# Patient Record
Sex: MALE | Race: WHITE | Employment: OTHER | URBAN - NONMETROPOLITAN AREA
[De-identification: names, ages, dates, MRNs, and addresses within clinical notes are randomized per-mention and may not be internally consistent; named-entity substitution may affect disease eponyms.]

---

## 2017-12-08 ENCOUNTER — HOSPITAL ENCOUNTER (OUTPATIENT)
Age: 72
Discharge: HOME | End: 2017-12-08
Payer: MEDICARE

## 2017-12-08 LAB
ALT SERPL-CCNC: 22 U/L (ref 11–66)
ANION GAP SERPL CALCULATED.3IONS-SCNC: 13 MEQ/L (ref 8–16)
AST SERPL-CCNC: 18 U/L (ref 5–40)
AVERAGE GLUCOSE: 108 MG/DL (ref 70–126)
BASOPHILS # BLD: 0.6 %
BASOPHILS ABSOLUTE: 0 THOU/MM3 (ref 0–0.1)
BILIRUBIN URINE: NEGATIVE
BLOOD, URINE: NEGATIVE
BUN BLDV-MCNC: 16 MG/DL (ref 7–22)
CALCIUM SERPL-MCNC: 9.3 MG/DL (ref 8.5–10.5)
CHARACTER, URINE: CLEAR
CHLORIDE BLD-SCNC: 98 MEQ/L (ref 98–111)
CHOLESTEROL, TOTAL: 196 MG/DL (ref 100–199)
CO2: 29 MEQ/L (ref 23–33)
COLOR: YELLOW
CREAT SERPL-MCNC: 0.8 MG/DL (ref 0.4–1.2)
EOSINOPHIL # BLD: 3.3 %
EOSINOPHILS ABSOLUTE: 0.2 THOU/MM3 (ref 0–0.4)
GFR SERPL CREATININE-BSD FRML MDRD: > 90 ML/MIN/1.73M2
GLUCOSE BLD-MCNC: 109 MG/DL (ref 70–108)
GLUCOSE, URINE: NEGATIVE MG/DL
HBA1C MFR BLD: 5.6 % (ref 4.4–6.4)
HCT VFR BLD CALC: 48.3 % (ref 42–52)
HDLC SERPL-MCNC: 41 MG/DL
HEMOGLOBIN: 16.6 GM/DL (ref 14–18)
KETONES, URINE: NEGATIVE
LDL CHOLESTEROL CALCULATED: 127 MG/DL
LEUKOCYTE EST, POC: NEGATIVE
LYMPHOCYTES # BLD: 38.6 %
LYMPHOCYTES ABSOLUTE: 2.4 THOU/MM3 (ref 1–4.8)
MCH RBC QN AUTO: 33.5 PG (ref 27–31)
MCHC RBC AUTO-ENTMCNC: 34.5 GM/DL (ref 33–37)
MCV RBC AUTO: 97.1 FL (ref 80–94)
MONOCYTES # BLD: 9.4 %
MONOCYTES ABSOLUTE: 0.6 THOU/MM3 (ref 0.4–1.3)
NITRITE, URINE: NEGATIVE
NUCLEATED RED BLOOD CELLS: 0 /100 WBC
PDW BLD-RTO: 13.4 % (ref 11.5–14.5)
PH UA: 6
PLATELET # BLD: 131 THOU/MM3 (ref 130–400)
PMV BLD AUTO: 8.5 MCM (ref 7.4–10.4)
POTASSIUM SERPL-SCNC: 5.1 MEQ/L (ref 3.5–5.2)
PROSTATE SPECIFIC ANTIGEN: 3.64 NG/ML (ref 0–1)
PROTEIN UA: NEGATIVE MG/DL
RBC # BLD: 4.97 MILL/MM3 (ref 4.7–6.1)
SEG NEUTROPHILS: 48.1 %
SEGMENTED NEUTROPHILS ABSOLUTE COUNT: 2.9 THOU/MM3 (ref 1.8–7.7)
SODIUM BLD-SCNC: 140 MEQ/L (ref 135–145)
SPECIFIC GRAVITY UA: 1.01 (ref 1–1.03)
TRIGL SERPL-MCNC: 141 MG/DL (ref 0–199)
TSH SERPL DL<=0.05 MIU/L-ACNC: 2.31 UIU/ML (ref 0.4–4.2)
UROBILINOGEN, URINE: 0.2 EU/DL
WBC # BLD: 6.1 THOU/MM3 (ref 4.8–10.8)

## 2017-12-08 PROCEDURE — 85025 COMPLETE CBC W/AUTO DIFF WBC: CPT

## 2017-12-08 PROCEDURE — 84460 ALANINE AMINO (ALT) (SGPT): CPT

## 2017-12-08 PROCEDURE — 84450 TRANSFERASE (AST) (SGOT): CPT

## 2017-12-08 PROCEDURE — 36415 COLL VENOUS BLD VENIPUNCTURE: CPT

## 2017-12-08 PROCEDURE — 83036 HEMOGLOBIN GLYCOSYLATED A1C: CPT

## 2017-12-08 PROCEDURE — G0103 PSA SCREENING: HCPCS

## 2017-12-08 PROCEDURE — 80048 BASIC METABOLIC PNL TOTAL CA: CPT

## 2017-12-08 PROCEDURE — 81003 URINALYSIS AUTO W/O SCOPE: CPT

## 2017-12-08 PROCEDURE — 84443 ASSAY THYROID STIM HORMONE: CPT

## 2017-12-08 PROCEDURE — 80061 LIPID PANEL: CPT

## 2018-01-10 ENCOUNTER — HOSPITAL ENCOUNTER (OUTPATIENT)
Age: 73
Discharge: HOME | End: 2018-01-10
Payer: MEDICARE

## 2018-01-10 ENCOUNTER — OFFICE VISIT (OUTPATIENT)
Dept: SURGERY | Age: 73
End: 2018-01-10
Payer: MEDICARE

## 2018-01-10 VITALS
HEART RATE: 81 BPM | RESPIRATION RATE: 20 BRPM | BODY MASS INDEX: 31.64 KG/M2 | SYSTOLIC BLOOD PRESSURE: 142 MMHG | TEMPERATURE: 97.9 F | WEIGHT: 226 LBS | DIASTOLIC BLOOD PRESSURE: 88 MMHG | HEIGHT: 71 IN

## 2018-01-10 DIAGNOSIS — K40.90 RIGHT INGUINAL HERNIA: Primary | ICD-10-CM

## 2018-01-10 DIAGNOSIS — I10 ESSENTIAL HYPERTENSION: ICD-10-CM

## 2018-01-10 DIAGNOSIS — K42.9 UMBILICAL HERNIA WITHOUT OBSTRUCTION AND WITHOUT GANGRENE: ICD-10-CM

## 2018-01-10 LAB
EKG ATRIAL RATE: 70 BPM
EKG P AXIS: 46 DEGREES
EKG P-R INTERVAL: 172 MS
EKG Q-T INTERVAL: 380 MS
EKG QRS DURATION: 92 MS
EKG QTC CALCULATION (BAZETT): 410 MS
EKG R AXIS: -36 DEGREES
EKG T AXIS: 47 DEGREES
EKG VENTRICULAR RATE: 70 BPM

## 2018-01-10 PROCEDURE — 99203 OFFICE O/P NEW LOW 30 MIN: CPT | Performed by: SURGERY

## 2018-01-10 PROCEDURE — 93005 ELECTROCARDIOGRAM TRACING: CPT

## 2018-01-10 RX ORDER — LISINOPRIL 5 MG/1
5 TABLET ORAL DAILY
COMMUNITY
End: 2018-02-14 | Stop reason: SDUPTHER

## 2018-01-10 RX ORDER — TAMSULOSIN HYDROCHLORIDE 0.4 MG/1
0.4 CAPSULE ORAL DAILY
COMMUNITY

## 2018-01-10 NOTE — PATIENT INSTRUCTIONS
Surgery scheduled 2/13/18. Arrive to [facility]. [facility] 2nd floor registration desk at 8 am. Nothing to eat or drink after midnight.  Bring a

## 2018-01-13 ASSESSMENT — ENCOUNTER SYMPTOMS
APNEA: 0
RHINORRHEA: 0
TROUBLE SWALLOWING: 0
SORE THROAT: 0
STRIDOR: 0
ABDOMINAL PAIN: 1
EYE DISCHARGE: 0
ALLERGIC/IMMUNOLOGIC NEGATIVE: 1
COUGH: 0
FACIAL SWELLING: 0
CHOKING: 0
PHOTOPHOBIA: 0
EYE REDNESS: 0
EYE ITCHING: 0
DIARRHEA: 0
BLOOD IN STOOL: 0
SHORTNESS OF BREATH: 0
COLOR CHANGE: 0
RECTAL PAIN: 0
CHEST TIGHTNESS: 0
VOMITING: 0
NAUSEA: 0
BACK PAIN: 0
VOICE CHANGE: 0
EYE PAIN: 0
WHEEZING: 0
SINUS PRESSURE: 0
ANAL BLEEDING: 0
ABDOMINAL DISTENTION: 0
CONSTIPATION: 0

## 2018-01-14 NOTE — PROGRESS NOTES
sounds, intact distal pulses and normal pulses. Exam reveals no gallop. No murmur heard. Pulmonary/Chest: Effort normal and breath sounds normal. No stridor. No respiratory distress. He has no decreased breath sounds. He has no wheezes. He has no rales. He exhibits no tenderness and no deformity. Abdominal: Soft. Bowel sounds are normal. He exhibits no distension, no fluid wave, no abdominal bruit, no pulsatile midline mass and no mass. There is no hepatosplenomegaly. There is tenderness in the periumbilical area. There is no rigidity, no rebound, no guarding and no CVA tenderness. A hernia (umbilical) is present. Hernia confirmed positive in the right inguinal area. Hernia confirmed negative in the ventral area and confirmed negative in the left inguinal area. Musculoskeletal: Normal range of motion. He exhibits no edema or tenderness. Lymphadenopathy:     He has no cervical adenopathy. He has no axillary adenopathy. Right: No inguinal and no supraclavicular adenopathy present. Left: No inguinal and no supraclavicular adenopathy present. Neurological: He is alert and oriented to person, place, and time. He has normal strength. No cranial nerve deficit or sensory deficit. Gait normal.   Skin: Skin is warm and dry. No abrasion, no bruising, no burn, no laceration and no rash noted. He is not diaphoretic. No cyanosis or erythema. No pallor. Nails show no clubbing. Psychiatric: He has a normal mood and affect.  His speech is normal and behavior is normal. Judgment and thought content normal. Cognition and memory are normal.     Lab Results   Component Value Date     12/08/2017    K 5.1 12/08/2017    CL 98 12/08/2017    CO2 29 12/08/2017     Lab Results   Component Value Date    CREATININE 0.8 12/08/2017     Lab Results   Component Value Date    WBC 6.1 12/08/2017    HGB 16.6 12/08/2017    HCT 48.3 12/08/2017    MCV 97.1 (H) 12/08/2017     12/08/2017     Lab Results

## 2018-01-17 ENCOUNTER — OFFICE VISIT (OUTPATIENT)
Dept: CARDIOLOGY CLINIC | Age: 73
End: 2018-01-17
Payer: MEDICARE

## 2018-01-17 VITALS
HEART RATE: 82 BPM | SYSTOLIC BLOOD PRESSURE: 148 MMHG | DIASTOLIC BLOOD PRESSURE: 92 MMHG | HEIGHT: 71 IN | BODY MASS INDEX: 31.47 KG/M2 | WEIGHT: 224.8 LBS

## 2018-01-17 DIAGNOSIS — I20.8 ANGINA AT REST (HCC): ICD-10-CM

## 2018-01-17 DIAGNOSIS — Z01.818 PRE-OP EVALUATION: ICD-10-CM

## 2018-01-17 DIAGNOSIS — R07.2 PRECORDIAL PAIN: ICD-10-CM

## 2018-01-17 PROBLEM — I20.89 ANGINA AT REST: Status: ACTIVE | Noted: 2018-01-17

## 2018-01-17 PROCEDURE — 99204 OFFICE O/P NEW MOD 45 MIN: CPT | Performed by: INTERNAL MEDICINE

## 2018-01-17 ASSESSMENT — ENCOUNTER SYMPTOMS
GASTROINTESTINAL NEGATIVE: 1
EYES NEGATIVE: 1
SHORTNESS OF BREATH: 1

## 2018-01-17 NOTE — PROGRESS NOTES
9.3 12/08/2017    AST 18 12/08/2017    ALT 22 12/08/2017     Hepatic Function Panel:    Lab Results   Component Value Date    ALT 22 12/08/2017    AST 18 12/08/2017     Magnesium:  No results found for: MG  PT/INR:  No results found for: PROTIME, INR  HgBA1c:    Lab Results   Component Value Date    LABA1C 5.6 12/08/2017     FLP:    Lab Results   Component Value Date    TRIG 141 12/08/2017    TRIG 121 03/16/2017    TRIG 142 02/12/2016    HDL 41 12/08/2017    HDL 78 03/16/2017    HDL 67 02/12/2016    LDLCALC 127 12/08/2017    LDLCALC 83 03/16/2017    LDLCALC 47 02/12/2016     TSH:    Lab Results   Component Value Date    TSH 2.310 12/08/2017     No results for input(s): CKTOTAL, CKMB, CKMBINDEX, TROPONINI in the last 72 hours. Assessment:   Mr. Maria G Razo is a 81 y.o. who is known to us with history of tobacco abuse and mild-moderate CAD in the past, who is seen for pre-op evaluation. The patient reports chest pain at rest. Retrosternal lasting a few minutes and subsiding spontaneously. Had cardiac cath in the past and was told he had some blockage, That was 15 years ago. Has palpitations with pre-syncope. He experienced that while driving. Hypertension. Smokes. FH of CAD, at 80-81 y/o. Hypothyroidism. The following diagnoses were addressed during this visit:  1. Pre-op evaluation: Prior to hernia surgery. XR Cardiac Cath Procedure   2. Precordial pain  XR Cardiac Cath Procedure   3. Angina at rest [facility])  XR Cardiac Cath Procedure           Plan:   The patient has multiple CVRF including htn, FH of CAD, tobacco abuse, a who presents with worrisome symptoms of chest pain at rest, and palpitations with presyncope. C/p pain in legs and thighs when walking. PT 2+ b/l. Based on the above, I think it is prudent to proceed with cardiac cath to rule out progression of CAD. Schedule cardiac cath.   The cardiac catheterization and angioplasty with stent implantation procedure was explained in details to the patient and family. All the potential risks and complications including, but not limited to, bleeding, renal damage and failure, stroke, cardiac rupture, hematoma, infection, emergency bypass surgery, vessel tear, and even death, were explained. The patient and family expressed understanding and agreed to proceed. I asked the patient to start taking an aspirin daily. Cardiac cath right radial approach. Orders Placed:  Orders Placed This Encounter   Procedures    XR Cardiac Cath Procedure     Standing Status:   Future     Standing Expiration Date:   1/17/2019     Order Specific Question:   Reason for exam:     Answer:   angina at rest     Medications:  No orders of the defined types were placed in this encounter. Discussed use, benefit, and side effects of prescribed medications. All patient questions answered. Pt voiced understanding. Instructed to continue current medications, diet and exercise. Continue risk factor modification and medical management. Patient agreed with treatment plan. Follow up as directed. The patient will be seen in 6 months for re-evaluation or sooner if he develops new symptoms. In the mean time, the patient will follow up with Derrick Contreras MD as scheduled.

## 2018-01-22 ENCOUNTER — OFFICE VISIT (OUTPATIENT)
Dept: UROLOGY | Age: 73
End: 2018-01-22
Payer: MEDICARE

## 2018-01-22 VITALS
HEIGHT: 71 IN | DIASTOLIC BLOOD PRESSURE: 88 MMHG | BODY MASS INDEX: 32.03 KG/M2 | SYSTOLIC BLOOD PRESSURE: 128 MMHG | WEIGHT: 228.8 LBS

## 2018-01-22 DIAGNOSIS — R97.20 ELEVATED PSA: Primary | ICD-10-CM

## 2018-01-22 DIAGNOSIS — N40.1 HYPERTROPHY OF PROSTATE WITH URINARY OBSTRUCTION: ICD-10-CM

## 2018-01-22 DIAGNOSIS — N13.8 HYPERTROPHY OF PROSTATE WITH URINARY OBSTRUCTION: ICD-10-CM

## 2018-01-22 DIAGNOSIS — R35.0 URINARY FREQUENCY: ICD-10-CM

## 2018-01-22 LAB
BILIRUBIN URINE: NEGATIVE
BLOOD URINE, POC: NEGATIVE
CHARACTER, URINE: CLEAR
COLOR, URINE: YELLOW
GLUCOSE URINE: NEGATIVE MG/DL
KETONES, URINE: NEGATIVE
LEUKOCYTE CLUMPS, URINE: NEGATIVE
NITRITE, URINE: NEGATIVE
PH, URINE: 7
POST VOID RESIDUAL (PVR): NORMAL ML
PROTEIN, URINE: NEGATIVE MG/DL
SPECIFIC GRAVITY, URINE: 1.01 (ref 1–1.03)
UROBILINOGEN, URINE: 0.2 EU/DL

## 2018-01-22 PROCEDURE — 51798 US URINE CAPACITY MEASURE: CPT | Performed by: UROLOGY

## 2018-01-22 PROCEDURE — 99204 OFFICE O/P NEW MOD 45 MIN: CPT | Performed by: UROLOGY

## 2018-01-22 PROCEDURE — 81003 URINALYSIS AUTO W/O SCOPE: CPT | Performed by: UROLOGY

## 2018-01-22 ASSESSMENT — ENCOUNTER SYMPTOMS
SHORTNESS OF BREATH: 0
DIARRHEA: 0
CONSTIPATION: 0
BLOOD IN STOOL: 0
CHEST TIGHTNESS: 0

## 2018-01-22 NOTE — PROGRESS NOTES
Subjective:      Patient ID: Sallie Fernandez 81 y.o. male 1945    Chief Complaint   Patient presents with    Advice Only     new patient; ref: Leigha Pizano Elevated PSA    Urinary Frequency       Other   This is a new (elevated PSA) problem. The current episode started more than 1 month ago. The problem occurs intermittently. The problem has been unchanged. Pertinent negatives include no chest pain, chills, fever, numbness or rash. Nothing aggravates the symptoms. He has tried nothing for the symptoms. The treatment provided no relief. Benign Prostatic Hypertrophy   This is a new problem. The current episode started more than 1 month ago. The problem has been gradually worsening since onset. Irritative symptoms include frequency. Pertinent negatives include no chills or dysuria. AUA score is 8-19. His sexual activity is non-contributory to the current illness. Nothing aggravates the symptoms. Past treatments include tamsulosin. The treatment provided mild relief. He has been using treatment for 2 or more years. Past Medical History:   Diagnosis Date    Angina at rest [facility]) 1/17/2018    Hyperlipidemia     Hypertension     Precordial pain 1/17/2018    S/P cardiac cath: 1/25/2018: 70-75% mid-LAD. LCX OK. RCA 20%. LVEF 454-50%. LVEDP 12 mmHg. 1/25/2018 1/25/2018: 70-75% mid-LAD. LCX OK. RCA 20%. LVEF 454-50%. LVEDP 12 mmHg. Dr. John Alexander S/P PTCA: 1/25/2018: FFR-guided stenting of mid-LAD Xience 2.75x28 mm, post-dilated to 3.78 mm proximally. 1/25/2018 1/25/2018: FFR-guided stenting of mid-LAD Xience 2.75x28 mm, post-dilated to 3.78 mm proximally. Dr. John Alexander Thyroid disease        Social History     Social History    Marital status:      Spouse name: N/A    Number of children: N/A    Years of education: N/A     Occupational History    Not on file.      Social History Main Topics    Smoking status: Current Every Day Smoker     Packs/day: 1.00     Types: Cigarettes    Smokeless tobacco: Former User     Quit date: 2014    Alcohol use Yes      Comment: 6-10 beers daily    Drug use: No    Sexual activity: No     Other Topics Concern    Not on file     Social History Narrative    No narrative on file       Family History   Problem Relation Age of Onset    Cancer Mother      breast    Heart Disease Father        Past Surgical History:   Procedure Laterality Date    CATARACT REMOVAL Bilateral 2017    JOINT REPLACEMENT Left 2010    KNEE SURGERY Bilateral     scopes       No Known Allergies      Current Outpatient Prescriptions:     lisinopril (PRINIVIL;ZESTRIL) 5 MG tablet, Take 5 mg by mouth daily, Disp: , Rfl:     tamsulosin (FLOMAX) 0.4 MG capsule, Take 0.4 mg by mouth daily, Disp: , Rfl:     aspirin 81 MG chewable tablet, Take 1 tablet by mouth daily, Disp: 30 tablet, Rfl: 0    atorvastatin (LIPITOR) 80 MG tablet, Take 1 tablet by mouth nightly, Disp: 30 tablet, Rfl: 3    metoprolol tartrate (LOPRESSOR) 25 MG tablet, Take 1 tablet by mouth 2 times daily, Disp: 60 tablet, Rfl: 3    amLODIPine (NORVASC) 10 MG tablet, Take 0.5 tablets by mouth daily, Disp: 30 tablet, Rfl: 3    prasugrel (EFFIENT) 10 MG TABS, Take 1 tablet by mouth daily, Disp: 30 tablet, Rfl: 3    nitroGLYCERIN (NITROSTAT) 0.4 MG SL tablet, Place 1 tablet under the tongue every 5 minutes as needed for Chest pain up to max of 3 total doses. If no relief after 1 dose, call 911., Disp: 25 tablet, Rfl: 1    levothyroxine (SYNTHROID) 25 MCG tablet, Take 25 mcg by mouth Daily, Disp: , Rfl:     Review of Systems   Constitutional: Negative for chills and fever. HENT: Negative for mouth sores and nosebleeds. Respiratory: Negative for chest tightness and shortness of breath. Cardiovascular: Negative for chest pain and palpitations. Gastrointestinal: Negative for blood in stool, constipation and diarrhea. Genitourinary: Positive for frequency. Negative for difficulty urinating and dysuria.    Skin: of mid-LAD Xience 2.75x28 mm, post-dilated to 3.78 mm proximally. Dr. Shanthi Mckeon Thyroid disease           Plan:        Follow up in 6 months with PSA in 3 and in 6 months. Will consider Proscar at that visit depending on how PSA is changing. Will re-check PVR at that visit as well.

## 2018-01-24 ENCOUNTER — PREP FOR PROCEDURE (OUTPATIENT)
Dept: CARDIOLOGY | Age: 73
End: 2018-01-24

## 2018-01-24 RX ORDER — NITROGLYCERIN 0.4 MG/1
0.4 TABLET SUBLINGUAL EVERY 5 MIN PRN
Status: CANCELLED | OUTPATIENT
Start: 2018-01-24

## 2018-01-24 RX ORDER — SODIUM CHLORIDE 0.9 % (FLUSH) 0.9 %
10 SYRINGE (ML) INJECTION PRN
Status: CANCELLED | OUTPATIENT
Start: 2018-01-24

## 2018-01-24 RX ORDER — SODIUM CHLORIDE 9 MG/ML
INJECTION, SOLUTION INTRAVENOUS CONTINUOUS
Status: CANCELLED | OUTPATIENT
Start: 2018-01-24

## 2018-01-24 RX ORDER — SODIUM CHLORIDE 0.9 % (FLUSH) 0.9 %
10 SYRINGE (ML) INJECTION EVERY 12 HOURS SCHEDULED
Status: CANCELLED | OUTPATIENT
Start: 2018-01-24

## 2018-01-24 RX ORDER — ASPIRIN 81 MG/1
324 TABLET, CHEWABLE ORAL ONCE
Status: CANCELLED | OUTPATIENT
Start: 2018-01-24 | End: 2018-01-24

## 2018-01-24 RX ORDER — DIPHENHYDRAMINE HYDROCHLORIDE 50 MG/ML
25 INJECTION INTRAMUSCULAR; INTRAVENOUS ONCE
Status: CANCELLED | OUTPATIENT
Start: 2018-01-24 | End: 2018-01-24

## 2018-01-25 ENCOUNTER — HOSPITAL ENCOUNTER (OUTPATIENT)
Dept: INPATIENT UNIT | Age: 73
Discharge: HOME | End: 2018-01-26
Attending: INTERNAL MEDICINE | Admitting: INTERNAL MEDICINE
Payer: MEDICARE

## 2018-01-25 ENCOUNTER — APPOINTMENT (OUTPATIENT)
Dept: CARDIAC CATH/INVASIVE PROCEDURES | Age: 73
End: 2018-01-25
Attending: INTERNAL MEDICINE
Payer: MEDICARE

## 2018-01-25 PROBLEM — Z98.890 S/P CARDIAC CATH: Status: ACTIVE | Noted: 2018-01-25

## 2018-01-25 PROBLEM — Z98.61 S/P PTCA (PERCUTANEOUS TRANSLUMINAL CORONARY ANGIOPLASTY): Status: ACTIVE | Noted: 2018-01-25

## 2018-01-25 LAB
ABO: NORMAL
ANION GAP SERPL CALCULATED.3IONS-SCNC: 14 MEQ/L (ref 8–16)
ANTIBODY SCREEN: NORMAL
BUN BLDV-MCNC: 16 MG/DL (ref 7–22)
CALCIUM SERPL-MCNC: 9.2 MG/DL (ref 8.5–10.5)
CHLORIDE BLD-SCNC: 105 MEQ/L (ref 98–111)
CHOLESTEROL, TOTAL: 192 MG/DL (ref 100–199)
CO2: 26 MEQ/L (ref 23–33)
CREAT SERPL-MCNC: 0.9 MG/DL (ref 0.4–1.2)
GFR SERPL CREATININE-BSD FRML MDRD: 83 ML/MIN/1.73M2
GLUCOSE BLD-MCNC: 117 MG/DL (ref 70–108)
HCT VFR BLD CALC: 48.7 % (ref 42–52)
HDLC SERPL-MCNC: 46 MG/DL
HEMOGLOBIN: 16.7 GM/DL (ref 14–18)
LDL CHOLESTEROL CALCULATED: 105 MG/DL
MCH RBC QN AUTO: 33.9 PG (ref 27–31)
MCHC RBC AUTO-ENTMCNC: 34.4 GM/DL (ref 33–37)
MCV RBC AUTO: 98.5 FL (ref 80–94)
PDW BLD-RTO: 13.8 % (ref 11.5–14.5)
PLATELET # BLD: 133 THOU/MM3 (ref 130–400)
PMV BLD AUTO: 8.5 MCM (ref 7.4–10.4)
POTASSIUM REFLEX MAGNESIUM: 4 MEQ/L (ref 3.5–5.2)
RBC # BLD: 4.94 MILL/MM3 (ref 4.7–6.1)
RH FACTOR: NORMAL
SODIUM BLD-SCNC: 145 MEQ/L (ref 135–145)
TRIGL SERPL-MCNC: 203 MG/DL (ref 0–199)
WBC # BLD: 6 THOU/MM3 (ref 4.8–10.8)

## 2018-01-25 PROCEDURE — 93458 L HRT ARTERY/VENTRICLE ANGIO: CPT | Performed by: INTERNAL MEDICINE

## 2018-01-25 PROCEDURE — 80061 LIPID PANEL: CPT

## 2018-01-25 PROCEDURE — 93571 IV DOP VEL&/PRESS C FLO 1ST: CPT | Performed by: INTERNAL MEDICINE

## 2018-01-25 PROCEDURE — 86850 RBC ANTIBODY SCREEN: CPT

## 2018-01-25 PROCEDURE — 6370000000 HC RX 637 (ALT 250 FOR IP)

## 2018-01-25 PROCEDURE — A6258 TRANSPARENT FILM >16<=48 IN: HCPCS

## 2018-01-25 PROCEDURE — 6360000002 HC RX W HCPCS

## 2018-01-25 PROCEDURE — 2580000003 HC RX 258: Performed by: PHYSICIAN ASSISTANT

## 2018-01-25 PROCEDURE — 85027 COMPLETE CBC AUTOMATED: CPT

## 2018-01-25 PROCEDURE — C1769 GUIDE WIRE: HCPCS

## 2018-01-25 PROCEDURE — C1874 STENT, COATED/COV W/DEL SYS: HCPCS

## 2018-01-25 PROCEDURE — C1725 CATH, TRANSLUMIN NON-LASER: HCPCS

## 2018-01-25 PROCEDURE — C1894 INTRO/SHEATH, NON-LASER: HCPCS

## 2018-01-25 PROCEDURE — 86900 BLOOD TYPING SEROLOGIC ABO: CPT

## 2018-01-25 PROCEDURE — C1760 CLOSURE DEV, VASC: HCPCS

## 2018-01-25 PROCEDURE — 92928 PRQ TCAT PLMT NTRAC ST 1 LES: CPT | Performed by: INTERNAL MEDICINE

## 2018-01-25 PROCEDURE — 36415 COLL VENOUS BLD VENIPUNCTURE: CPT

## 2018-01-25 PROCEDURE — 2580000003 HC RX 258: Performed by: INTERNAL MEDICINE

## 2018-01-25 PROCEDURE — C1887 CATHETER, GUIDING: HCPCS

## 2018-01-25 PROCEDURE — 93005 ELECTROCARDIOGRAM TRACING: CPT

## 2018-01-25 PROCEDURE — 80048 BASIC METABOLIC PNL TOTAL CA: CPT

## 2018-01-25 PROCEDURE — 2720000010 HC SURG SUPPLY STERILE

## 2018-01-25 PROCEDURE — C1773 RET DEV, INSERTABLE: HCPCS

## 2018-01-25 PROCEDURE — 6370000000 HC RX 637 (ALT 250 FOR IP): Performed by: INTERNAL MEDICINE

## 2018-01-25 PROCEDURE — 2500000003 HC RX 250 WO HCPCS

## 2018-01-25 PROCEDURE — 86901 BLOOD TYPING SEROLOGIC RH(D): CPT

## 2018-01-25 PROCEDURE — 2780000010 HC IMPLANT OTHER

## 2018-01-25 RX ORDER — SODIUM CHLORIDE 0.9 % (FLUSH) 0.9 %
10 SYRINGE (ML) INJECTION EVERY 12 HOURS SCHEDULED
Status: DISCONTINUED | OUTPATIENT
Start: 2018-01-25 | End: 2018-01-25

## 2018-01-25 RX ORDER — TAMSULOSIN HYDROCHLORIDE 0.4 MG/1
0.4 CAPSULE ORAL DAILY
Status: DISCONTINUED | OUTPATIENT
Start: 2018-01-25 | End: 2018-01-26 | Stop reason: HOSPADM

## 2018-01-25 RX ORDER — LISINOPRIL 5 MG/1
5 TABLET ORAL DAILY
Status: DISCONTINUED | OUTPATIENT
Start: 2018-01-25 | End: 2018-01-26 | Stop reason: HOSPADM

## 2018-01-25 RX ORDER — ASPIRIN 81 MG/1
324 TABLET, CHEWABLE ORAL ONCE
Status: DISCONTINUED | OUTPATIENT
Start: 2018-01-25 | End: 2018-01-25

## 2018-01-25 RX ORDER — SODIUM CHLORIDE 9 MG/ML
INJECTION, SOLUTION INTRAVENOUS CONTINUOUS
Status: DISCONTINUED | OUTPATIENT
Start: 2018-01-25 | End: 2018-01-25

## 2018-01-25 RX ORDER — LEVOTHYROXINE SODIUM 0.03 MG/1
25 TABLET ORAL DAILY
COMMUNITY

## 2018-01-25 RX ORDER — PRASUGREL 10 MG/1
10 TABLET, FILM COATED ORAL DAILY
Status: DISCONTINUED | OUTPATIENT
Start: 2018-01-26 | End: 2018-01-26 | Stop reason: HOSPADM

## 2018-01-25 RX ORDER — ACETAMINOPHEN 325 MG/1
650 TABLET ORAL EVERY 4 HOURS PRN
Status: DISCONTINUED | OUTPATIENT
Start: 2018-01-25 | End: 2018-01-26 | Stop reason: HOSPADM

## 2018-01-25 RX ORDER — AMLODIPINE BESYLATE 10 MG/1
10 TABLET ORAL DAILY
Status: DISCONTINUED | OUTPATIENT
Start: 2018-01-25 | End: 2018-01-26

## 2018-01-25 RX ORDER — NITROGLYCERIN 0.4 MG/1
0.4 TABLET SUBLINGUAL EVERY 5 MIN PRN
Status: DISCONTINUED | OUTPATIENT
Start: 2018-01-25 | End: 2018-01-25

## 2018-01-25 RX ORDER — ASPIRIN 81 MG/1
81 TABLET, CHEWABLE ORAL DAILY
Status: DISCONTINUED | OUTPATIENT
Start: 2018-01-26 | End: 2018-01-26 | Stop reason: HOSPADM

## 2018-01-25 RX ORDER — SODIUM CHLORIDE 9 MG/ML
INJECTION, SOLUTION INTRAVENOUS CONTINUOUS
Status: DISCONTINUED | OUTPATIENT
Start: 2018-01-25 | End: 2018-01-26 | Stop reason: HOSPADM

## 2018-01-25 RX ORDER — DIPHENHYDRAMINE HYDROCHLORIDE 50 MG/ML
25 INJECTION INTRAMUSCULAR; INTRAVENOUS ONCE
Status: DISCONTINUED | OUTPATIENT
Start: 2018-01-25 | End: 2018-01-25

## 2018-01-25 RX ORDER — ASPIRIN 325 MG
TABLET ORAL
Status: COMPLETED
Start: 2018-01-25 | End: 2018-01-25

## 2018-01-25 RX ORDER — SODIUM CHLORIDE 0.9 % (FLUSH) 0.9 %
10 SYRINGE (ML) INJECTION PRN
Status: DISCONTINUED | OUTPATIENT
Start: 2018-01-25 | End: 2018-01-25

## 2018-01-25 RX ORDER — ONDANSETRON 2 MG/ML
4 INJECTION INTRAMUSCULAR; INTRAVENOUS EVERY 6 HOURS PRN
Status: DISCONTINUED | OUTPATIENT
Start: 2018-01-25 | End: 2018-01-26 | Stop reason: HOSPADM

## 2018-01-25 RX ORDER — ATORVASTATIN CALCIUM 80 MG/1
80 TABLET, FILM COATED ORAL NIGHTLY
Status: DISCONTINUED | OUTPATIENT
Start: 2018-01-25 | End: 2018-01-26 | Stop reason: HOSPADM

## 2018-01-25 RX ORDER — SODIUM CHLORIDE 0.9 % (FLUSH) 0.9 %
10 SYRINGE (ML) INJECTION PRN
Status: DISCONTINUED | OUTPATIENT
Start: 2018-01-25 | End: 2018-01-26 | Stop reason: HOSPADM

## 2018-01-25 RX ORDER — LEVOTHYROXINE SODIUM 0.03 MG/1
25 TABLET ORAL DAILY
Status: DISCONTINUED | OUTPATIENT
Start: 2018-01-25 | End: 2018-01-26 | Stop reason: HOSPADM

## 2018-01-25 RX ORDER — SODIUM CHLORIDE 0.9 % (FLUSH) 0.9 %
10 SYRINGE (ML) INJECTION EVERY 12 HOURS SCHEDULED
Status: DISCONTINUED | OUTPATIENT
Start: 2018-01-25 | End: 2018-01-26 | Stop reason: HOSPADM

## 2018-01-25 RX ADMIN — ASPIRIN 325 MG: 325 TABLET, COATED ORAL at 08:13

## 2018-01-25 RX ADMIN — SODIUM CHLORIDE: 9 INJECTION, SOLUTION INTRAVENOUS at 07:25

## 2018-01-25 RX ADMIN — METOPROLOL TARTRATE 25 MG: 25 TABLET ORAL at 14:35

## 2018-01-25 RX ADMIN — SODIUM CHLORIDE: 9 INJECTION, SOLUTION INTRAVENOUS at 14:35

## 2018-01-25 NOTE — H&P
[facility]  Sedation/Analgesia History & Physical    Pt Name: Anu Blanco  MRN: [id number]  YOB: 1945  Provider Performing Procedure: Diana Ledezma  Primary Care Physician: Nichole Farr MD    PRE-PROCEDURE   DNR-CCA/DNR-CC []Yes [x]No  Brief History/Pre-Procedure Diagnosis: Chest pain at rest. Angina at rest. Multiple CVRF. CONSENT  I have discussed with the patient and or the patient representative the indication, alternatives, and the possible risk and/ or complications of the planned procedure and the anesthesia or conscious sedation methods. The patient and or representative appear to understand and agree to proceed. I have discussed with the patient risks, benefits, and alternatives (and relevant risks, benefits, and side effects related to alternatives or not receiving care), and likelihood of the success. MEDICAL HISTORY        has a past medical history of Angina at rest [facility]); Hyperlipidemia; Hypertension; Precordial pain; and Thyroid disease. SURGICAL HISTORY   has a past surgical history that includes knee surgery (Bilateral); Cataract removal (Bilateral, 2017); and joint replacement (Left, 2010).   Additional information:       ALLERGIES   Allergies as of 01/25/2018    (No Known Allergies)     Additional information:       MEDICATIONS   Coumadin Use Last 7 Days [x]No []Yes  Antiplatelet drug therapy use last 7 days  []No [x]Yes  Other anticoagulant use last 7 days  [x]No []Yes      Current Facility-Administered Medications:     0.9 % sodium chloride infusion, , Intravenous, Continuous, Rainer Samuels PA-C, Last Rate: 75 mL/hr at 01/25/18 0725    aspirin chewable tablet 324 mg, 324 mg, Oral, Once, Rainer Samuels PA-C    diphenhydrAMINE (BENADRYL) injection 25 mg, 25 mg, Intravenous, Once, Rainer Samuels PA-C    hydrocortisone sodium succinate PF (SOLU-CORTEF) injection 100 mg, 100 mg, Intravenous, Once, Rainer Samuels PA-C    nitroGLYCERIN

## 2018-01-26 ENCOUNTER — TELEPHONE (OUTPATIENT)
Dept: SURGERY | Age: 73
End: 2018-01-26

## 2018-01-26 VITALS
BODY MASS INDEX: 31.42 KG/M2 | WEIGHT: 224.4 LBS | DIASTOLIC BLOOD PRESSURE: 88 MMHG | OXYGEN SATURATION: 95 % | TEMPERATURE: 97.2 F | RESPIRATION RATE: 16 BRPM | HEIGHT: 71 IN | HEART RATE: 67 BPM | SYSTOLIC BLOOD PRESSURE: 141 MMHG

## 2018-01-26 LAB
CHOLESTEROL, TOTAL: 157 MG/DL (ref 100–199)
HCT VFR BLD CALC: 42.1 % (ref 42–52)
HDLC SERPL-MCNC: 31 MG/DL
HEMOGLOBIN: 14.2 GM/DL (ref 14–18)
INR BLD: 1 (ref 0.85–1.13)
LDL CHOLESTEROL CALCULATED: 86 MG/DL
MCH RBC QN AUTO: 32.8 PG (ref 27–31)
MCHC RBC AUTO-ENTMCNC: 33.7 GM/DL (ref 33–37)
MCV RBC AUTO: 97.2 FL (ref 80–94)
PDW BLD-RTO: 14.4 % (ref 11.5–14.5)
PLATELET # BLD: 141 THOU/MM3 (ref 130–400)
PMV BLD AUTO: 8.1 MCM (ref 7.4–10.4)
RBC # BLD: 4.33 MILL/MM3 (ref 4.7–6.1)
TRIGL SERPL-MCNC: 199 MG/DL (ref 0–199)
WBC # BLD: 8.3 THOU/MM3 (ref 4.8–10.8)

## 2018-01-26 PROCEDURE — 93005 ELECTROCARDIOGRAM TRACING: CPT

## 2018-01-26 PROCEDURE — 85027 COMPLETE CBC AUTOMATED: CPT

## 2018-01-26 PROCEDURE — 6370000000 HC RX 637 (ALT 250 FOR IP): Performed by: INTERNAL MEDICINE

## 2018-01-26 PROCEDURE — 36415 COLL VENOUS BLD VENIPUNCTURE: CPT

## 2018-01-26 PROCEDURE — 85610 PROTHROMBIN TIME: CPT

## 2018-01-26 PROCEDURE — 2580000003 HC RX 258: Performed by: INTERNAL MEDICINE

## 2018-01-26 PROCEDURE — 80061 LIPID PANEL: CPT

## 2018-01-26 RX ORDER — AMLODIPINE BESYLATE 10 MG/1
5 TABLET ORAL DAILY
Qty: 30 TABLET | Refills: 3 | Status: SHIPPED | OUTPATIENT
Start: 2018-01-26 | End: 2018-08-15 | Stop reason: SDUPTHER

## 2018-01-26 RX ORDER — PRASUGREL 10 MG/1
10 TABLET, FILM COATED ORAL DAILY
Qty: 30 TABLET | Refills: 3 | Status: SHIPPED | OUTPATIENT
Start: 2018-01-26 | End: 2018-05-17 | Stop reason: SDUPTHER

## 2018-01-26 RX ORDER — ASPIRIN 81 MG/1
81 TABLET, CHEWABLE ORAL DAILY
Qty: 30 TABLET | Refills: 0 | COMMUNITY
Start: 2018-01-26

## 2018-01-26 RX ORDER — AMLODIPINE BESYLATE 5 MG/1
5 TABLET ORAL DAILY
Status: DISCONTINUED | OUTPATIENT
Start: 2018-01-27 | End: 2018-01-26 | Stop reason: HOSPADM

## 2018-01-26 RX ORDER — ATORVASTATIN CALCIUM 80 MG/1
80 TABLET, FILM COATED ORAL NIGHTLY
Qty: 30 TABLET | Refills: 3 | Status: SHIPPED | OUTPATIENT
Start: 2018-01-26 | End: 2018-05-17 | Stop reason: SDUPTHER

## 2018-01-26 RX ORDER — NITROGLYCERIN 0.4 MG/1
0.4 TABLET SUBLINGUAL EVERY 5 MIN PRN
Qty: 25 TABLET | Refills: 1 | Status: SHIPPED | OUTPATIENT
Start: 2018-01-26 | End: 2019-03-05 | Stop reason: SDUPTHER

## 2018-01-26 RX ADMIN — METOPROLOL TARTRATE 25 MG: 25 TABLET ORAL at 08:16

## 2018-01-26 RX ADMIN — Medication 10 ML: at 08:17

## 2018-01-26 RX ADMIN — AMLODIPINE BESYLATE 10 MG: 10 TABLET ORAL at 08:13

## 2018-01-26 RX ADMIN — LEVOTHYROXINE SODIUM 25 MCG: 25 TABLET ORAL at 08:12

## 2018-01-26 RX ADMIN — PRASUGREL HYDROCHLORIDE 10 MG: 10 TABLET, FILM COATED ORAL at 10:55

## 2018-01-26 RX ADMIN — LISINOPRIL 5 MG: 5 TABLET ORAL at 08:14

## 2018-01-26 NOTE — PROGRESS NOTES
Discharge teaching and instructions for diagnosis/procedure of PCI completed with patient using teachback method. AVS reviewed. Printed prescriptions given to patient. Patient voiced understanding regarding prescriptions, follow up appointments, and care of self at home. Discharged in a wheelchair to  home with support per family. Patient given stent card and post stent booklet. Home meds sent home with patient.
Returned to [phone]. Monitor attached showing SR. Dressing to right groin dry and intact. No bleeding, swelling, or edema noted. Hemostasis maintained.   0.9nss infusing with approx 700 ml remaining
To cath lab per wheelchair.   Family at bedside and directed to waiting area
report called to Des Arteaga
05:16:34 [facility] ROUTINE RETRIEVAL  Sinus bradycardia with marked sinus arrhythmia  Left axis deviation  Low voltage QRS, consider pulmonary disease, pericardial effusion, or normal variant  Septal infarct , age undetermined  Inferior infarct (cited on or before 25-JAN-2018)  Abnormal ECG  When compared with ECG of 25-JAN-2018 12:54,  No significant change was found      Left Heart Cath:   Successful FFR-guided PCI/stenting of the 70-75% calcified lesion in the mid-LAD artery using LISA Xience 2.75 x 28  mm, post-dilated to 3.78 mm proximally. Normal LM is good. LAD is moderately calcified, with moderate atherosclerotic lesions in the proximal segment and long concentric lesion  with angiographic 70-75% narrowing. Normal non-dominant LCx. Dominant RCA with 20% smooth lesion in the mid segment. Low normal LV systolic function with LVEF estimated at 45-50%. Lab Data:    Cardiac Enzymes:  No results for input(s): CKTOTAL, CKMB, CKMBINDEX, TROPONINI in the last 72 hours.     CBC:   Lab Results   Component Value Date    WBC 8.3 01/26/2018    RBC 4.33 01/26/2018    HGB 14.2 01/26/2018    HCT 42.1 01/26/2018     01/26/2018       CMP:  Lab Results   Component Value Date     01/25/2018    K 5.1 12/08/2017     01/25/2018    CO2 26 01/25/2018    BUN 16 01/25/2018    CREATININE 0.9 01/25/2018    LABGLOM 83 01/25/2018    GLUCOSE 117 01/25/2018    CALCIUM 9.2 01/25/2018       Hepatic Function Panel:  Lab Results   Component Value Date    ALT 22 12/08/2017    AST 18 12/08/2017       Magnesium:  No results found for: MG    PT/INR:    Lab Results   Component Value Date    INR 1.00 01/26/2018       HgBA1c:    Lab Results   Component Value Date    LABA1C 5.6 12/08/2017       FLP:  Lab Results   Component Value Date    TRIG 199 01/26/2018    HDL 31 01/26/2018    LDLCALC 86 01/26/2018       TSH:    Lab Results   Component Value Date    TSH 2.310 12/08/2017         Assessment:    S\p

## 2018-01-26 NOTE — TELEPHONE ENCOUNTER
Spoke w/ eGra Brunner regarding surgery scheduled 2/13/18 with Dr. Ivory Galan and the need to cancel surgery due to recent stents being placed. Pt aware that surgery needs cancelled. Advised patient to follow closely with his cardiologist and when it is safe for him to hold his blood thinners he call back to office and we can make him another appointment to see Dr. Ivory Galan and r/s his surgery. He voiced understanding.      Surgery cancelled per Nichole Doty in [address]

## 2018-01-27 LAB
EKG ATRIAL RATE: 56 BPM
EKG ATRIAL RATE: 58 BPM
EKG ATRIAL RATE: 62 BPM
EKG P AXIS: 32 DEGREES
EKG P AXIS: 47 DEGREES
EKG P AXIS: 55 DEGREES
EKG P-R INTERVAL: 192 MS
EKG P-R INTERVAL: 194 MS
EKG P-R INTERVAL: 202 MS
EKG Q-T INTERVAL: 392 MS
EKG Q-T INTERVAL: 398 MS
EKG Q-T INTERVAL: 420 MS
EKG QRS DURATION: 100 MS
EKG QRS DURATION: 80 MS
EKG QRS DURATION: 96 MS
EKG QTC CALCULATION (BAZETT): 384 MS
EKG QTC CALCULATION (BAZETT): 403 MS
EKG QTC CALCULATION (BAZETT): 405 MS
EKG R AXIS: -29 DEGREES
EKG R AXIS: -31 DEGREES
EKG R AXIS: -37 DEGREES
EKG T AXIS: 18 DEGREES
EKG T AXIS: 38 DEGREES
EKG T AXIS: 9 DEGREES
EKG VENTRICULAR RATE: 56 BPM
EKG VENTRICULAR RATE: 58 BPM
EKG VENTRICULAR RATE: 62 BPM

## 2018-02-14 ENCOUNTER — OFFICE VISIT (OUTPATIENT)
Dept: CARDIOLOGY CLINIC | Age: 73
End: 2018-02-14
Payer: MEDICARE

## 2018-02-14 VITALS
DIASTOLIC BLOOD PRESSURE: 72 MMHG | HEART RATE: 68 BPM | SYSTOLIC BLOOD PRESSURE: 132 MMHG | BODY MASS INDEX: 30.72 KG/M2 | HEIGHT: 71 IN | WEIGHT: 219.4 LBS

## 2018-02-14 DIAGNOSIS — I25.10 CORONARY ARTERY DISEASE INVOLVING NATIVE CORONARY ARTERY OF NATIVE HEART WITHOUT ANGINA PECTORIS: ICD-10-CM

## 2018-02-14 DIAGNOSIS — Z87.891 PERSONAL HISTORY OF NICOTINE DEPENDENCE: ICD-10-CM

## 2018-02-14 DIAGNOSIS — Z98.61 S/P PTCA (PERCUTANEOUS TRANSLUMINAL CORONARY ANGIOPLASTY): ICD-10-CM

## 2018-02-14 DIAGNOSIS — Z98.890 S/P CARDIAC CATH: Primary | ICD-10-CM

## 2018-02-14 PROCEDURE — 99213 OFFICE O/P EST LOW 20 MIN: CPT | Performed by: PHYSICIAN ASSISTANT

## 2018-02-14 PROCEDURE — 99406 BEHAV CHNG SMOKING 3-10 MIN: CPT | Performed by: PHYSICIAN ASSISTANT

## 2018-02-14 RX ORDER — LISINOPRIL 5 MG/1
5 TABLET ORAL DAILY
Qty: 90 TABLET | Refills: 2 | Status: SHIPPED | OUTPATIENT
Start: 2018-02-14 | End: 2018-08-22 | Stop reason: SDUPTHER

## 2018-02-14 NOTE — PROGRESS NOTES
Pt C/O sob first thing in the am, dizziness if coughing alot,       Pt denies CP,Headache,heart palpitations, swelling, fatigue
tartrate (LOPRESSOR) 25 MG tablet Take 1 tablet by mouth 2 times daily 60 tablet 3    amLODIPine (NORVASC) 10 MG tablet Take 0.5 tablets by mouth daily 30 tablet 3    prasugrel (EFFIENT) 10 MG TABS Take 1 tablet by mouth daily 30 tablet 3    nitroGLYCERIN (NITROSTAT) 0.4 MG SL tablet Place 1 tablet under the tongue every 5 minutes as needed for Chest pain up to max of 3 total doses. If no relief after 1 dose, call 911. 25 tablet 1    levothyroxine (SYNTHROID) 25 MCG tablet Take 25 mcg by mouth Daily      tamsulosin (FLOMAX) 0.4 MG capsule Take 0.4 mg by mouth daily       No current facility-administered medications for this visit. Social History     Social History    Marital status:      Spouse name: N/A    Number of children: N/A    Years of education: N/A     Social History Main Topics    Smoking status: Current Every Day Smoker     Packs/day: 1.00     Types: Cigarettes    Smokeless tobacco: Former User     Quit date: 2014    Alcohol use Yes      Comment: 6-10 beers daily    Drug use: No    Sexual activity: No     Other Topics Concern    None     Social History Narrative    None       Family History   Problem Relation Age of Onset    Cancer Mother      breast    Heart Disease Father        Blood pressure 132/72, pulse 68, height 5' 11\" (1.803 m), weight 219 lb 6.4 oz (99.5 kg). General:   Well developed, well nourished  Lungs:   Clear to auscultation  Heart:    Normal S1 S2, No murmur, rubs, or gallops  Abdomen:   Soft, non tender, no organomegalies, positive bowel sounds  Extremities:   No edema, no cyanosis, good peripheral pulses  Neurological:   Awake, alert, oriented. No obvious focal deficits  Musculoskeletal:  No obvious deformities      Cardiac catheterization 1/25/2018  PCI of the LAD  20% RCA  Ejection fraction 45-50%    1. S/P cardiac cath: 1/25/2018: 70-75% mid-LAD. LCX OK. RCA 20%. LVEF 454-50%. LVEDP 12 mmHg.   Cardiac rehab evaluation    SC TOBACCO USE CESSATION

## 2018-02-14 NOTE — PATIENT INSTRUCTIONS
pressure and may increase certain side effects of lisinopril. Avoid becoming overheated or dehydrated during exercise, in hot weather, or by not drinking enough fluids. Lisinopril can decrease sweating and you may be more prone to heat stroke. Do not use salt substitutes or potassium supplements while taking lisinopril, unless your doctor has told you to. Avoid getting up too fast from a sitting or lying position, or you may feel dizzy. Get up slowly and steady yourself to prevent a fall. What are the possible side effects of lisinopril? Get emergency medical help if you have signs of an allergic reaction: hives; severe stomach pain, difficult breathing; swelling of your face, lips, tongue, or throat. Call your doctor at once if you have:  · a light-headed feeling, like you might pass out;  · little or no urinating;  · fever, sore throat;  · high potassium --nausea, slow or unusual heart rate, weakness, loss of movement;  · kidney problems --little or no urinating, painful or difficult urination, swelling in your feet or ankles, feeling tired or short of breath; or  · liver problems --nausea, upper stomach pain, itching, tired feeling, loss of appetite, dark urine, suha-colored stools, jaundice (yellowing of the skin or eyes). Common side effects may include:  · headache, dizziness;  · cough; or  · chest pain. This is not a complete list of side effects and others may occur. Call your doctor for medical advice about side effects. You may report side effects to FDA at 4-516-FDA-2425. What other drugs will affect lisinopril?   Tell your doctor about all your current medicines and any you start or stop using, especially:  · lithium;  · a diuretic or \"water pill\";  · gold injections to treat arthritis;  · insulin or oral diabetes medicine;  · a potassium supplement;  · medicine to prevent organ transplant rejection --everolimus, sirolimus, tacrolimus, temsirolimus; or  · NSAIDs (nonsteroidal anti-inflammatory drugs) --aspirin, ibuprofen (Advil, Motrin), naproxen (Aleve), celecoxib, diclofenac, indomethacin, meloxicam, and others. This list is not complete. Other drugs may interact with lisinopril, including prescription and over-the-counter medicines, vitamins, and herbal products. Not all possible interactions are listed in this medication guide. Where can I get more information? Your pharmacist can provide more information about lisinopril. Remember, keep this and all other medicines out of the reach of children, never share your medicines with others, and use this medication only for the indication prescribed. Every effort has been made to ensure that the information provided by [facility] is accurate, up-to-date, and complete, but no guarantee is made to that effect. Drug information contained herein may be time sensitive. [facility] information has been compiled for use by healthcare practitioners and consumers in the [place] and therefore [facility] does not warrant that uses outside of the [place] are appropriate, unless specifically indicated otherwise. [facility]'s drug information does not endorse drugs, diagnose patients or recommend therapy. [facility] information is an informational resource designed to assist licensed healthcare practitioners in caring for their patients and/or to serve consumers viewing this service as a supplement to, and not a substitute for, the expertise, skill, knowledge and judgment of healthcare practitioners. The absence of a warning for a given drug or drug combination in no way should be construed to indicate that the drug or drug combination is safe, effective or appropriate for any given patient. [facility] does not assume any responsibility for any aspect of healthcare administered with the aid of information [facility] provides.  The information contained herein is not intended to cover all possible uses, directions, precautions, warnings, drug

## 2018-03-01 ENCOUNTER — HOSPITAL ENCOUNTER (OUTPATIENT)
Dept: CARDIAC REHAB | Age: 73
Discharge: HOME | End: 2018-03-01

## 2018-04-11 PROBLEM — Z01.818 PRE-OP EVALUATION: Status: RESOLVED | Noted: 2018-01-17 | Resolved: 2018-04-11

## 2018-05-17 RX ORDER — ATORVASTATIN CALCIUM 80 MG/1
TABLET, FILM COATED ORAL
Qty: 30 TABLET | Refills: 2 | Status: SHIPPED | OUTPATIENT
Start: 2018-05-17 | End: 2018-08-14 | Stop reason: SDUPTHER

## 2018-05-17 RX ORDER — PRASUGREL 10 MG/1
TABLET, FILM COATED ORAL
Qty: 30 TABLET | Refills: 2 | Status: SHIPPED | OUTPATIENT
Start: 2018-05-17 | End: 2018-08-14 | Stop reason: SDUPTHER

## 2018-05-29 ENCOUNTER — HOSPITAL ENCOUNTER (OUTPATIENT)
Age: 73
Discharge: HOME | End: 2018-05-29
Payer: MEDICARE

## 2018-05-29 LAB
ALBUMIN SERPL-MCNC: 4.3 G/DL (ref 3.5–5.1)
ALP BLD-CCNC: 72 U/L (ref 38–126)
ALT SERPL-CCNC: 29 U/L (ref 11–66)
ANION GAP SERPL CALCULATED.3IONS-SCNC: 13 MEQ/L (ref 8–16)
AST SERPL-CCNC: 25 U/L (ref 5–40)
BASOPHILS # BLD: 0.5 %
BASOPHILS ABSOLUTE: 0 THOU/MM3 (ref 0–0.1)
BILIRUB SERPL-MCNC: 0.6 MG/DL (ref 0.3–1.2)
BUN BLDV-MCNC: 13 MG/DL (ref 7–22)
CALCIUM SERPL-MCNC: 9.2 MG/DL (ref 8.5–10.5)
CHLORIDE BLD-SCNC: 99 MEQ/L (ref 98–111)
CHOLESTEROL, TOTAL: 109 MG/DL (ref 100–199)
CO2: 26 MEQ/L (ref 23–33)
CREAT SERPL-MCNC: 0.8 MG/DL (ref 0.4–1.2)
EOSINOPHIL # BLD: 3.5 %
EOSINOPHILS ABSOLUTE: 0.3 THOU/MM3 (ref 0–0.4)
GFR SERPL CREATININE-BSD FRML MDRD: > 90 ML/MIN/1.73M2
GLUCOSE BLD-MCNC: 105 MG/DL (ref 70–108)
HCT VFR BLD CALC: 48.4 % (ref 42–52)
HDLC SERPL-MCNC: 46 MG/DL
HEMOGLOBIN: 16.4 GM/DL (ref 14–18)
LDL CHOLESTEROL CALCULATED: 45 MG/DL
LYMPHOCYTES # BLD: 33.5 %
LYMPHOCYTES ABSOLUTE: 2.5 THOU/MM3 (ref 1–4.8)
MCH RBC QN AUTO: 33.4 PG (ref 27–31)
MCHC RBC AUTO-ENTMCNC: 33.9 GM/DL (ref 33–37)
MCV RBC AUTO: 98.6 FL (ref 80–94)
MONOCYTES # BLD: 9.2 %
MONOCYTES ABSOLUTE: 0.7 THOU/MM3 (ref 0.4–1.3)
NUCLEATED RED BLOOD CELLS: 0 /100 WBC
PDW BLD-RTO: 13.6 % (ref 11.5–14.5)
PLATELET # BLD: 128 THOU/MM3 (ref 130–400)
PMV BLD AUTO: 8.1 FL (ref 7.4–10.4)
POTASSIUM SERPL-SCNC: 5 MEQ/L (ref 3.5–5.2)
RBC # BLD: 4.91 MILL/MM3 (ref 4.7–6.1)
SEG NEUTROPHILS: 53.3 %
SEGMENTED NEUTROPHILS ABSOLUTE COUNT: 4.1 THOU/MM3 (ref 1.8–7.7)
SODIUM BLD-SCNC: 138 MEQ/L (ref 135–145)
TOTAL PROTEIN: 7.2 G/DL (ref 6.1–8)
TRIGL SERPL-MCNC: 88 MG/DL (ref 0–199)
TSH SERPL DL<=0.05 MIU/L-ACNC: 2.18 UIU/ML (ref 0.4–4.2)
WBC # BLD: 7.6 THOU/MM3 (ref 4.8–10.8)

## 2018-05-29 PROCEDURE — 80053 COMPREHEN METABOLIC PANEL: CPT

## 2018-05-29 PROCEDURE — 36415 COLL VENOUS BLD VENIPUNCTURE: CPT

## 2018-05-29 PROCEDURE — 85025 COMPLETE CBC W/AUTO DIFF WBC: CPT

## 2018-05-29 PROCEDURE — 84443 ASSAY THYROID STIM HORMONE: CPT

## 2018-05-29 PROCEDURE — 80061 LIPID PANEL: CPT

## 2018-07-06 ENCOUNTER — HOSPITAL ENCOUNTER (OUTPATIENT)
Age: 73
Discharge: HOME | End: 2018-07-06
Payer: MEDICARE

## 2018-07-06 LAB
FERRITIN: 189 NG/ML (ref 22–322)
FOLATE: 19.7 NG/ML (ref 4.8–24.2)
IRON: 138 UG/DL (ref 65–195)
PLATELET # BLD: 133 THOU/MM3 (ref 130–400)
VITAMIN B-12: 296 PG/ML (ref 211–911)

## 2018-07-06 PROCEDURE — 82746 ASSAY OF FOLIC ACID SERUM: CPT

## 2018-07-06 PROCEDURE — 82728 ASSAY OF FERRITIN: CPT

## 2018-07-06 PROCEDURE — 85049 AUTOMATED PLATELET COUNT: CPT

## 2018-07-06 PROCEDURE — 82607 VITAMIN B-12: CPT

## 2018-07-06 PROCEDURE — 36415 COLL VENOUS BLD VENIPUNCTURE: CPT

## 2018-07-06 PROCEDURE — 83540 ASSAY OF IRON: CPT

## 2018-07-21 ENCOUNTER — HOSPITAL ENCOUNTER (OUTPATIENT)
Age: 73
Discharge: HOME | End: 2018-07-21
Payer: MEDICARE

## 2018-07-21 DIAGNOSIS — R35.0 URINARY FREQUENCY: ICD-10-CM

## 2018-07-21 LAB — PROSTATE SPECIFIC ANTIGEN: 2.19 NG/ML (ref 0–1)

## 2018-07-21 PROCEDURE — 36415 COLL VENOUS BLD VENIPUNCTURE: CPT

## 2018-07-21 PROCEDURE — 84153 ASSAY OF PSA TOTAL: CPT

## 2018-07-23 NOTE — PROGRESS NOTES
3.78 mm proximally.; and Thyroid disease. Past Surgical History  The patient  has a past surgical history that includes knee surgery (Bilateral); Cataract removal (Bilateral, 2017); and joint replacement (Left, 2010). Family History  This patient's family history includes Cancer in his mother; Heart Disease in his father. Social History  Evelio Riddle  reports that he has been smoking Cigarettes. He has been smoking about 1.00 pack per day. He quit smokeless tobacco use about 4 years ago. He reports that he drinks alcohol. He reports that he does not use drugs. Subjective:     Review of Systems  No problems with ears, nose or throat. No problems with eyes. No chest pain, shortness of breath, abdominal pain, extremity pain or weakness, and no neurological deficits. No rashes.  symptoms per HPI. The remainder of the review of symptoms is negative. Objective:     PE:   Vitals:    07/24/18 0827   BP: 138/82   Weight: 223 lb 9.6 oz (101.4 kg)   Height: 5' 10\" (1.778 m)       Constitutional: Alert and oriented times 3, no acute distress and cooperative to examination with appropriate mood and affect. HENT:   Head:        Normocephalic and atraumatic. Mouth/Throat:         Mucous membranes are normal.   Eyes:         EOM are normal. No scleral icterus. PERRLA. Neck:        Supple, symmetrical, trachea midline  Pulmonary/Chest:      Chest symmetric with normal A/P diameter, Normal respiratory rate and rhthym. No use of accessory muscles. Abdominal:         Soft. No tenderness. Bowel sounds present. Musculoskeletal:         Normal range of motion. No edema or tenderness of lower extremities. Extremities: No cyanosis, clubbing, or edema present. Neurological:        Alert and oriented. No cranial nerve deficit. Lovetta Blaze Psychiatric:        Normal mood and affect.       Labs   Urine dip demonstrates   Results for POC orders placed in visit on 07/24/18   POCT Urinalysis No Micro (Auto)   Result Value

## 2018-07-24 ENCOUNTER — OFFICE VISIT (OUTPATIENT)
Dept: UROLOGY | Age: 73
End: 2018-07-24
Payer: MEDICARE

## 2018-07-24 VITALS
DIASTOLIC BLOOD PRESSURE: 82 MMHG | SYSTOLIC BLOOD PRESSURE: 138 MMHG | HEIGHT: 70 IN | WEIGHT: 223.6 LBS | BODY MASS INDEX: 32.01 KG/M2

## 2018-07-24 DIAGNOSIS — N40.1 BPH WITH OBSTRUCTION/LOWER URINARY TRACT SYMPTOMS: Primary | ICD-10-CM

## 2018-07-24 DIAGNOSIS — N13.8 BPH WITH OBSTRUCTION/LOWER URINARY TRACT SYMPTOMS: Primary | ICD-10-CM

## 2018-07-24 LAB
BILIRUBIN URINE: NEGATIVE
BLOOD URINE, POC: NEGATIVE
CHARACTER, URINE: CLEAR
COLOR, URINE: YELLOW
GLUCOSE URINE: NEGATIVE MG/DL
KETONES, URINE: NEGATIVE
LEUKOCYTE CLUMPS, URINE: NEGATIVE
NITRITE, URINE: NEGATIVE
PH, URINE: 7
POST VOID RESIDUAL (PVR): 31 ML
PROTEIN, URINE: NEGATIVE MG/DL
SPECIFIC GRAVITY, URINE: 1.01 (ref 1–1.03)
UROBILINOGEN, URINE: 1 EU/DL

## 2018-07-24 PROCEDURE — 99213 OFFICE O/P EST LOW 20 MIN: CPT | Performed by: NURSE PRACTITIONER

## 2018-07-24 PROCEDURE — 81003 URINALYSIS AUTO W/O SCOPE: CPT | Performed by: NURSE PRACTITIONER

## 2018-07-24 PROCEDURE — 51798 US URINE CAPACITY MEASURE: CPT | Performed by: NURSE PRACTITIONER

## 2018-08-14 RX ORDER — PRASUGREL 10 MG/1
TABLET, FILM COATED ORAL
Qty: 90 TABLET | Refills: 3 | Status: SHIPPED | OUTPATIENT
Start: 2018-08-14 | End: 2019-03-05 | Stop reason: SDUPTHER

## 2018-08-14 RX ORDER — ATORVASTATIN CALCIUM 80 MG/1
TABLET, FILM COATED ORAL
Qty: 90 TABLET | Refills: 3 | Status: SHIPPED | OUTPATIENT
Start: 2018-08-14 | End: 2019-03-05 | Stop reason: SDUPTHER

## 2018-08-15 RX ORDER — AMLODIPINE BESYLATE 10 MG/1
TABLET ORAL
Qty: 30 TABLET | Refills: 0 | Status: SHIPPED | OUTPATIENT
Start: 2018-08-15 | End: 2018-08-22 | Stop reason: SDUPTHER

## 2018-08-22 ENCOUNTER — OFFICE VISIT (OUTPATIENT)
Dept: CARDIOLOGY CLINIC | Age: 73
End: 2018-08-22
Payer: MEDICARE

## 2018-08-22 VITALS
WEIGHT: 223.8 LBS | HEIGHT: 71 IN | BODY MASS INDEX: 31.33 KG/M2 | DIASTOLIC BLOOD PRESSURE: 78 MMHG | SYSTOLIC BLOOD PRESSURE: 126 MMHG | HEART RATE: 62 BPM

## 2018-08-22 DIAGNOSIS — R06.09 DOE (DYSPNEA ON EXERTION): Primary | ICD-10-CM

## 2018-08-22 DIAGNOSIS — J44.9 CHRONIC OBSTRUCTIVE PULMONARY DISEASE, UNSPECIFIED COPD TYPE (HCC): ICD-10-CM

## 2018-08-22 PROCEDURE — 99213 OFFICE O/P EST LOW 20 MIN: CPT | Performed by: INTERNAL MEDICINE

## 2018-08-22 RX ORDER — ESCITALOPRAM OXALATE 10 MG/1
10 TABLET ORAL DAILY
COMMUNITY
End: 2020-08-12

## 2018-08-22 NOTE — PROGRESS NOTES
Rafael Elias  CARDIOLOGY  [address] [address]  Dept: [phone]  Dept Fax: [phone]  Loc: [phone]    Visit Date: 8/22/2018    Mr. Brien Abdi is a 72 y.o. male  who presented for:  Chief Complaint   Patient presents with    Check-Up    Coronary Artery Disease       HPI:   HPI   71 yo M previously seen for pre-op. ECG/stress test was positive and ultimately resulted in a PCI to the LAD 1/25/18. No energy. Tired. Felt as if he has more energy prior to stent. No chest pain or discomfort. He is sleeping more. +LOPEZ. Taking all medications. No syncope. No palpitations. No side effects. EF 45-50%. Current Outpatient Prescriptions:     escitalopram (LEXAPRO) 10 MG tablet, Take 10 mg by mouth daily, Disp: , Rfl:     amLODIPine (NORVASC) 10 MG tablet, TAKE 1/2 TABLET BY MOUTH ONE TIME A DAY , Disp: 30 tablet, Rfl: 0    metoprolol tartrate (LOPRESSOR) 25 MG tablet, TAKE 1 TABLET BY MOUTH TWO TIMES A DAY , Disp: 60 tablet, Rfl: 1    prasugrel (EFFIENT) 10 MG TABS, TAKE 1 TABLET BY MOUTH ONE TIME A DAY , Disp: 90 tablet, Rfl: 3    atorvastatin (LIPITOR) 80 MG tablet, TAKE 1 TABLET BY MOUTH ONE TIME A DAY AT NIGHT, Disp: 90 tablet, Rfl: 3    NONFORMULARY, , Disp: , Rfl:     lisinopril (PRINIVIL;ZESTRIL) 5 MG tablet, Take 1 tablet by mouth daily, Disp: 90 tablet, Rfl: 2    aspirin 81 MG chewable tablet, Take 1 tablet by mouth daily, Disp: 30 tablet, Rfl: 0    nitroGLYCERIN (NITROSTAT) 0.4 MG SL tablet, Place 1 tablet under the tongue every 5 minutes as needed for Chest pain up to max of 3 total doses. If no relief after 1 dose, call 911., Disp: 25 tablet, Rfl: 1    levothyroxine (SYNTHROID) 25 MCG tablet, Take 25 mcg by mouth Daily, Disp: , Rfl:     tamsulosin (FLOMAX) 0.4 MG capsule, Take 0.4 mg by mouth daily, Disp: , Rfl:     Past Medical History  Antoine Santana  has a past medical history of Angina at rest [facility]);  Hyperlipidemia; Hypertension; Precordial pain; S/P cardiac cath: 1/25/2018: 70-75% mid-LAD. LCX OK. RCA 20%. LVEF 454-50%. LVEDP 12 mmHg.; S/P PTCA: 1/25/2018: FFR-guided stenting of mid-LAD Xience 2.75x28 mm, post-dilated to 3.78 mm proximally.; and Thyroid disease. Social History  Edwige Waters  reports that he has been smoking Cigarettes. He has been smoking about 1.00 pack per day. He quit smokeless tobacco use about 4 years ago. He reports that he drinks alcohol. He reports that he does not use drugs. Family History  Edwige Waters family history includes Cancer in his mother; Heart Disease in his father. There is no family history of bicuspid aortic valve, aneurysms, heart transplant, pacemakers, defibrillators, or sudden cardiac death. Past Surgical History   Past Surgical History:   Procedure Laterality Date    CATARACT REMOVAL Bilateral 2017    JOINT REPLACEMENT Left 2010    KNEE SURGERY Bilateral     scopes    PTCA         Review of Systems   Constitutional: Negative for chills and fever  HENT: Negative for congestion, sinus pressure, sneezing and sore throat. Eyes: Negative for pain, discharge, redness and itching. Respiratory: Negative for apnea, cough, chest tightness and shortness of breath. Gastrointestinal: Negative for abdominal distention, abdominal pain, blood in stool, constipation, diarrhea and nausea. Endocrine: Negative for cold intolerance, heat intolerance, polydipsia. Genitourinary: Negative for dysuria, enuresis, flank pain and hematuria. Musculoskeletal: Negative for arthralgias, joint swelling and neck pain. Neurological: Negative for numbness and headaches. Psychiatric/Behavioral: Negative for agitation, confusion, decreased concentration and dysphoric mood.      Objective:     BP (!) 140/84   Pulse 62   Ht 5' 11\" (1.803 m)   Wt 223 lb 12.8 oz (101.5 kg)   BMI 31.21 kg/m²     Wt Readings from Last 3 Encounters:   08/22/18 223 lb 12.8 oz (101.5 kg)   07/24/18 223 lb 9.6 oz (101.4 01/26/2018         Lab Results   Component Value Date    LABA1C 5.6 12/08/2017       Lab Results   Component Value Date    TRIG 88 05/29/2018    HDL 46 05/29/2018    LDLCALC 45 05/29/2018       Lab Results   Component Value Date    TSH 2.180 05/29/2018         Testing Reviewed:      I have individually reviewed the cardiac test below:    ECHO: No results found for this or any previous visit. Assessment/Plan   Fatigue  LOPEZ  S/p LAD PCI 1/2018  Nicotine abuse - 25 years  Will check TTE.  F/u Pulmonary for COPD and VENKAT. Takign ASA/Effient. Taking statin, follow period LFTs. Discussed diet/exercise/BP/weigh loss/health lifestyle choices/lipids; the patient understands the goals and will try to comply.     Disposition:  3-6 months    Electronically signed by Grzegorz Hobbs MD   8/22/2018 at 12:11 PM

## 2018-08-23 RX ORDER — AMLODIPINE BESYLATE 10 MG/1
TABLET ORAL
Qty: 45 TABLET | Refills: 3 | Status: SHIPPED | OUTPATIENT
Start: 2018-08-23 | End: 2020-02-18 | Stop reason: DRUGHIGH

## 2018-08-23 RX ORDER — LISINOPRIL 5 MG/1
5 TABLET ORAL DAILY
Qty: 90 TABLET | Refills: 3 | Status: SHIPPED | OUTPATIENT
Start: 2018-08-23 | End: 2019-03-05 | Stop reason: SDUPTHER

## 2018-08-24 ENCOUNTER — HOSPITAL ENCOUNTER (OUTPATIENT)
Dept: NON INVASIVE DIAGNOSTICS | Age: 73
Discharge: HOME | End: 2018-08-24
Payer: MEDICARE

## 2018-08-24 DIAGNOSIS — R06.09 DOE (DYSPNEA ON EXERTION): ICD-10-CM

## 2018-08-24 DIAGNOSIS — J44.9 CHRONIC OBSTRUCTIVE PULMONARY DISEASE, UNSPECIFIED COPD TYPE (HCC): ICD-10-CM

## 2018-08-24 LAB
LV EF: 53 %
LVEF MODALITY: NORMAL

## 2018-08-24 PROCEDURE — 93306 TTE W/DOPPLER COMPLETE: CPT

## 2019-01-10 ENCOUNTER — OFFICE VISIT (OUTPATIENT)
Dept: PULMONOLOGY | Age: 74
End: 2019-01-10
Payer: MEDICARE

## 2019-01-10 VITALS
BODY MASS INDEX: 31.95 KG/M2 | SYSTOLIC BLOOD PRESSURE: 136 MMHG | HEIGHT: 71 IN | WEIGHT: 228.2 LBS | HEART RATE: 72 BPM | OXYGEN SATURATION: 98 % | DIASTOLIC BLOOD PRESSURE: 82 MMHG | TEMPERATURE: 97.5 F

## 2019-01-10 DIAGNOSIS — F17.290 OTHER TOBACCO PRODUCT NICOTINE DEPENDENCE, UNCOMPLICATED: ICD-10-CM

## 2019-01-10 DIAGNOSIS — R06.2 WHEEZING: ICD-10-CM

## 2019-01-10 DIAGNOSIS — F17.218 CIGARETTE NICOTINE DEPENDENCE WITH OTHER NICOTINE-INDUCED DISORDER: ICD-10-CM

## 2019-01-10 DIAGNOSIS — Z87.891 PERSONAL HISTORY OF TOBACCO USE: ICD-10-CM

## 2019-01-10 DIAGNOSIS — J44.9 CHRONIC OBSTRUCTIVE PULMONARY DISEASE, UNSPECIFIED COPD TYPE (HCC): Primary | ICD-10-CM

## 2019-01-10 DIAGNOSIS — F17.200 SMOKER: ICD-10-CM

## 2019-01-10 DIAGNOSIS — Z87.891 PERSONAL HISTORY OF SMOKING: ICD-10-CM

## 2019-01-10 PROCEDURE — 99205 OFFICE O/P NEW HI 60 MIN: CPT | Performed by: INTERNAL MEDICINE

## 2019-01-10 PROCEDURE — 94618 PULMONARY STRESS TESTING: CPT | Performed by: INTERNAL MEDICINE

## 2019-01-10 RX ORDER — ALBUTEROL SULFATE 90 UG/1
2 AEROSOL, METERED RESPIRATORY (INHALATION) EVERY 4 HOURS PRN
Qty: 3 INHALER | Refills: 1 | Status: SHIPPED | OUTPATIENT
Start: 2019-01-10 | End: 2020-09-01

## 2019-01-10 ASSESSMENT — ENCOUNTER SYMPTOMS
BACK PAIN: 1
COUGH: 1
EYES NEGATIVE: 1
GASTROINTESTINAL NEGATIVE: 1
SHORTNESS OF BREATH: 1
WHEEZING: 1

## 2019-03-05 ENCOUNTER — OFFICE VISIT (OUTPATIENT)
Dept: CARDIOLOGY CLINIC | Age: 74
End: 2019-03-05
Payer: MEDICARE

## 2019-03-05 VITALS
SYSTOLIC BLOOD PRESSURE: 150 MMHG | HEIGHT: 70 IN | BODY MASS INDEX: 33.21 KG/M2 | WEIGHT: 232 LBS | HEART RATE: 68 BPM | DIASTOLIC BLOOD PRESSURE: 91 MMHG

## 2019-03-05 DIAGNOSIS — Z98.61 S/P PTCA (PERCUTANEOUS TRANSLUMINAL CORONARY ANGIOPLASTY): ICD-10-CM

## 2019-03-05 DIAGNOSIS — R06.09 DOE (DYSPNEA ON EXERTION): Primary | ICD-10-CM

## 2019-03-05 DIAGNOSIS — I25.10 CORONARY ARTERY DISEASE INVOLVING NATIVE CORONARY ARTERY OF NATIVE HEART WITHOUT ANGINA PECTORIS: ICD-10-CM

## 2019-03-05 DIAGNOSIS — J44.9 CHRONIC OBSTRUCTIVE PULMONARY DISEASE, UNSPECIFIED COPD TYPE (HCC): ICD-10-CM

## 2019-03-05 DIAGNOSIS — Z98.890 S/P CARDIAC CATH: ICD-10-CM

## 2019-03-05 DIAGNOSIS — F17.200 CURRENT EVERY DAY SMOKER: ICD-10-CM

## 2019-03-05 PROBLEM — I20.89 ANGINA AT REST: Status: RESOLVED | Noted: 2018-01-17 | Resolved: 2019-03-05

## 2019-03-05 PROBLEM — I20.8 ANGINA AT REST (HCC): Status: RESOLVED | Noted: 2018-01-17 | Resolved: 2019-03-05

## 2019-03-05 PROCEDURE — 93000 ELECTROCARDIOGRAM COMPLETE: CPT | Performed by: NURSE PRACTITIONER

## 2019-03-05 PROCEDURE — 99213 OFFICE O/P EST LOW 20 MIN: CPT | Performed by: NURSE PRACTITIONER

## 2019-03-05 RX ORDER — NITROGLYCERIN 0.4 MG/1
0.4 TABLET SUBLINGUAL EVERY 5 MIN PRN
Qty: 25 TABLET | Refills: 1 | Status: SHIPPED | OUTPATIENT
Start: 2019-03-05 | End: 2021-10-05 | Stop reason: SDUPTHER

## 2019-03-05 RX ORDER — ATORVASTATIN CALCIUM 80 MG/1
TABLET, FILM COATED ORAL
Qty: 90 TABLET | Refills: 3 | Status: SHIPPED | OUTPATIENT
Start: 2019-03-05 | End: 2020-04-22

## 2019-03-05 RX ORDER — LISINOPRIL 10 MG/1
10 TABLET ORAL 2 TIMES DAILY
COMMUNITY
End: 2019-03-05

## 2019-03-05 RX ORDER — LISINOPRIL 40 MG/1
40 TABLET ORAL DAILY
Qty: 90 TABLET | Refills: 3 | Status: SHIPPED | OUTPATIENT
Start: 2019-03-05 | End: 2020-09-01 | Stop reason: DRUGHIGH

## 2019-03-05 RX ORDER — LISINOPRIL 5 MG/1
5 TABLET ORAL DAILY
Qty: 90 TABLET | Refills: 3 | Status: SHIPPED | OUTPATIENT
Start: 2019-03-05 | End: 2019-03-05 | Stop reason: CLARIF

## 2019-03-05 RX ORDER — PRASUGREL 10 MG/1
TABLET, FILM COATED ORAL
Qty: 90 TABLET | Refills: 3 | Status: SHIPPED | OUTPATIENT
Start: 2019-03-05 | End: 2020-04-22

## 2019-03-20 ENCOUNTER — OFFICE VISIT (OUTPATIENT)
Dept: CARDIOLOGY CLINIC | Age: 74
End: 2019-03-20
Payer: MEDICARE

## 2019-03-20 VITALS
HEIGHT: 70 IN | DIASTOLIC BLOOD PRESSURE: 88 MMHG | SYSTOLIC BLOOD PRESSURE: 150 MMHG | HEART RATE: 80 BPM | BODY MASS INDEX: 33.36 KG/M2 | WEIGHT: 233 LBS

## 2019-03-20 DIAGNOSIS — I10 ESSENTIAL HYPERTENSION: Primary | ICD-10-CM

## 2019-03-20 PROCEDURE — 99211 OFF/OP EST MAY X REQ PHY/QHP: CPT | Performed by: PHYSICIAN ASSISTANT

## 2019-04-18 ENCOUNTER — HOSPITAL ENCOUNTER (OUTPATIENT)
Dept: GENERAL RADIOLOGY | Age: 74
Discharge: HOME | End: 2019-04-18
Payer: MEDICARE

## 2019-04-18 ENCOUNTER — HOSPITAL ENCOUNTER (OUTPATIENT)
Age: 74
Discharge: HOME | End: 2019-04-18
Payer: MEDICARE

## 2019-04-18 DIAGNOSIS — M54.9 RIGHT-SIDED BACK PAIN, UNSPECIFIED BACK LOCATION, UNSPECIFIED CHRONICITY: ICD-10-CM

## 2019-04-18 PROCEDURE — 72100 X-RAY EXAM L-S SPINE 2/3 VWS: CPT

## 2019-05-08 ENCOUNTER — HOSPITAL ENCOUNTER (OUTPATIENT)
Dept: PHYSICAL THERAPY | Age: 74
Setting detail: THERAPIES SERIES
Discharge: HOME | End: 2019-05-08
Payer: MEDICARE

## 2019-05-08 PROCEDURE — 97110 THERAPEUTIC EXERCISES: CPT

## 2019-05-08 PROCEDURE — 97161 PT EVAL LOW COMPLEX 20 MIN: CPT

## 2019-05-08 ASSESSMENT — PAIN DESCRIPTION - DESCRIPTORS: DESCRIPTORS: ACHING;CONSTANT;SHOOTING

## 2019-05-08 ASSESSMENT — PAIN DESCRIPTION - PAIN TYPE: TYPE: ACUTE PAIN

## 2019-05-08 ASSESSMENT — PAIN DESCRIPTION - LOCATION: LOCATION: BACK;HIP

## 2019-05-08 ASSESSMENT — PAIN DESCRIPTION - ORIENTATION: ORIENTATION: RIGHT

## 2019-05-08 ASSESSMENT — PAIN SCALES - GENERAL: PAINLEVEL_OUTOF10: 2

## 2019-05-08 ASSESSMENT — PAIN DESCRIPTION - PROGRESSION: CLINICAL_PROGRESSION: NOT CHANGED

## 2019-05-08 ASSESSMENT — PAIN DESCRIPTION - FREQUENCY: FREQUENCY: CONTINUOUS

## 2019-05-08 NOTE — FLOWSHEET NOTE
** PLEASE SIGN, DATE AND TIME CERTIFICATION BELOW AND RETURN TO [facility] OUTPATIENT REHABILITATION (FAX #: [phone]). ATTEST/CO-SIGN IF ACCESSING VIA [employer]. THANK YOU.**    I certify that I have examined the patient below and determined that Physical Medicine and Rehabilitation service is necessary and that I approve the established plan of care for up to 90 days or as specifically noted. Attestation, signature or co-signature of physician indicates approval of certification requirements.    ________________________ ____________ __________  Physician Signature   Date   Time       [address]     Time In: 3315  Time Out: 09  Minutes: 52  Timed Code Treatment Minutes: 20 Minutes                Date: 2019  Patient Name: Apolinar Byrnes,  Gender:  male        CSN: [id number]   : 1945  (72 y.o.)  Referral Date : 19     Referring Practitioner: Dr. Mitch Barnett      Diagnosis: Lumbago; Right sided back pain (M54.9)  Treatment Diagnosis: lumbago; low back pain; postural dysfunction  Additional Pertinent Hx: HTN       General:  PT Visit Information  Onset Date: 19  PT Insurance Information: [facility]; Insurance pays at 95% once $2000 deductible is met; aquatic therapy covered; modalities covered except IONTO;   Total # of Visits to Date: 1  Plan of Care/Certification Expiration Date: 19  Progress Note Due Date: 19  Progress Note Counter:                         See Medical History Questionnaire for information related to allergies and medications.     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 He first went to the chiropractor and after 3 visits he went to his PCP for greater evaluation. X-ray indicated general DDD throughout his lumbar spine. Pain and stiffness worse in the morning or when having to get up in the middle of the night; usually as the day progresses he loosens up and the pain is more tolerable. He admits to using a cane in the AM for greater support. Admits to numbness in his feet from time to time and a doppler did indicate a DVT in right leg. Walking and standing tolerance is limited due to pain/ache. Pain:  Patient Currently in Pain: Yes  Pain Assessment: 0-10  Pain Level: 2  Patient's Stated Pain Goal: No pain  Pain Type: Acute pain  Pain Location: Back, Hip  Pain Orientation: Right  Pain Radiating Towards: Shoots down to knee on right side   Pain Descriptors: Aching, Constant, Shooting  Pain Frequency: Continuous  Clinical Progression: Not changed  Non-Pharmaceutical Pain Intervention(s): Cold applied, Heat applied, Repositioned, Rest     Social/Functional History:    Lives With: Alone  Type of Home: House  Home Layout: One level  Home Access: Stairs to enter with rails, Ramped entrance  Entrance Stairs - Number of Steps: 4             ADL Assistance: Independent  Homemaking Assistance: Independent  Ambulation Assistance: Independent  Transfer Assistance: Independent    Active : Yes  Mode of Transportation: Car  Occupation: Retired  Leisure & Hobbies:  Yardwork; work around [employer]; [employer] for neighbors     Objective  Overall Orientation Status: Within Normal Limits            Observation/Palpation  Posture: Fair  Observation: Seated with left side shift/off loading the right side      Lumbar: Greater pain and limitation with left side bend and rotation; extension alleviated pain and was easier to go into          Strength RLE: WFL  Comment: some minor difficulty with resisted right hip flexion     Strength LLE:

## 2019-05-10 ENCOUNTER — HOSPITAL ENCOUNTER (OUTPATIENT)
Dept: PHYSICAL THERAPY | Age: 74
Setting detail: THERAPIES SERIES
Discharge: HOME | End: 2019-05-10
Payer: MEDICARE

## 2019-05-10 ASSESSMENT — PAIN DESCRIPTION - PROGRESSION: CLINICAL_PROGRESSION: NOT CHANGED

## 2019-05-10 NOTE — PROGRESS NOTES
[address]  PHYSICAL THERAPY MISSED TREATMENT NOTE  OUTPATIENT REHABILITATION    Date: 5/10/2019  Patient Name: Gene Ty        MRN: [id number]   : 1945  (72 y.o.)  Gender: male   Referring Practitioner: Dr. Rd Aguirre  Diagnosis: Lumbago; Right sided back pain (M54.9)    PT Visit Information  Onset Date: 19  PT Insurance Information: [employer]; Insurance pays at 95% once $2000 deductible is met; aquatic therapy covered; modalities covered except IONTO;   Total # of Visits to Date: 2  Plan of Care/Certification Expiration Date: 19  Progress Note Counter:     REASON FOR MISSED TREATMENT:  Missed Treat; attempted to participate in therapy, however he was not feeling well and asked to leave. He was worried that canceling today's appt would count against him.      Miguel Angel Jeong \"Aileen\Anne Lazo, SAMMT  [id number]

## 2019-05-15 ENCOUNTER — HOSPITAL ENCOUNTER (OUTPATIENT)
Dept: PHYSICAL THERAPY | Age: 74
Setting detail: THERAPIES SERIES
Discharge: HOME | End: 2019-05-15
Payer: MEDICARE

## 2019-05-15 PROCEDURE — 97110 THERAPEUTIC EXERCISES: CPT

## 2019-05-15 ASSESSMENT — PAIN DESCRIPTION - LOCATION: LOCATION: HIP;BACK

## 2019-05-15 ASSESSMENT — PAIN DESCRIPTION - PAIN TYPE: TYPE: CHRONIC PAIN

## 2019-05-15 ASSESSMENT — PAIN DESCRIPTION - ORIENTATION: ORIENTATION: RIGHT

## 2019-05-15 ASSESSMENT — PAIN SCALES - GENERAL: PAINLEVEL_OUTOF10: 4

## 2019-05-17 ENCOUNTER — HOSPITAL ENCOUNTER (OUTPATIENT)
Dept: PHYSICAL THERAPY | Age: 74
Setting detail: THERAPIES SERIES
Discharge: HOME | End: 2019-05-17
Payer: MEDICARE

## 2019-05-17 PROCEDURE — 97110 THERAPEUTIC EXERCISES: CPT

## 2019-05-17 PROCEDURE — 97530 THERAPEUTIC ACTIVITIES: CPT

## 2019-05-17 ASSESSMENT — PAIN SCALES - GENERAL: PAINLEVEL_OUTOF10: 3

## 2019-05-17 NOTE — PROGRESS NOTES
[place]     Time In: 930  Time Out: 1010  Minutes: 40  Timed Code Treatment Minutes: 40 Minutes                Date: 2019  Patient Name: Eber Blizzard,  Gender:  male        CSN: [id number]   : 1945  (72 y.o.)  Referral Date : 19    Referring Practitioner: Dr. 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 Pain:  Patient Currently in Pain: Yes  Pain Level: 3      Objective     Exercises  Exercise 1: Moist heat: SKC, Hamstring and LTR x 3 x 10 seconds  Exercise 2: Moist heat: Pelvic Tilt x10 hold x 5  Exercise 3: Moist heat: DLS in hooklying: Abdominal bracing x10 hold x 5, UE, marches, abd/ER orange band, adductor squeezes with ball x 10  Exercise 4: Seated DLS: LAQ's, marches, LAQ's x 10  Exercise 5: Education: Log Roll transfer, abdominal bracing x 10 every hour.   Exercise 6: Seated with alternating UE/LE lifts while on heat: 10x  Exercise 7: // bars: side stepping for 4 lengths; marches for 4 lengths  Exercise 8: Seated piriformis stretches: 2x30 seconds each         Activity and activities further minimizing stress placed on his lumbar spine   Long term goal 3: Brie Johnson will report minmal to no pain with all bed transfers    Renee Sigala PT

## 2019-05-22 ENCOUNTER — HOSPITAL ENCOUNTER (OUTPATIENT)
Dept: PHYSICAL THERAPY | Age: 74
Setting detail: THERAPIES SERIES
Discharge: HOME | End: 2019-05-22
Payer: MEDICARE

## 2019-05-22 PROCEDURE — 97112 NEUROMUSCULAR REEDUCATION: CPT

## 2019-05-22 PROCEDURE — 97110 THERAPEUTIC EXERCISES: CPT

## 2019-05-22 NOTE — PROGRESS NOTES
each  Exercise 11: Airex: anterior/posterior wt shifts; marches: 10x each  Exercise 12: Gray disc: seated tilts and marches: 15x each          Activity Tolerance:  Activity Tolerance: Patient Tolerated treatment well  Activity Tolerance: Felt really good after today's session    Assessment: Body structures, Functions, Activity limitations: Decreased functional mobility , Decreased strength, Decreased endurance, Decreased ROM  Assessment: Saleem Shelton continues to demonstrate improved posture and stabilization as his pain lessens. He is reoprting more soreness throughout, however it is because of the added exercises. Discharge Recommendations: Continue to assess pending progress    Patient Education:  Patient Education: Stretches and DLS in supine and seated. Plan:  Times per week: 2x per week   Plan weeks: 8 weeks   Specific instructions for Next Treatment: Review pelvic tilt; check alignment; progress DLS program  Current Treatment Recommendations: Strengthening, ROM, Balance Training, Functional Mobility Training, Gait Training, Neuromuscular Re-education, Manual Therapy - Joint Manipulation, Manual Therapy - Soft Tissue Mobilization, Pain Management, Patient/Caregiver Education & Training, Home Exercise Program, Modalities, Aquatics    Goals:  Patient goals : Be able to get up and out of bed without pain/stiffness; resolve low back pain    Short term goals  Time Frame for Short term goals: 4 weeks   Short term goal 1: Keenan Avendano will demonstrate good abdominal bracing and posture with all exercises, requiring minimal verbal cuing. Short term goal 2: Keenan Avendano will report max pain level of 2-3/10 with all bed transfers and general mobility   Short term goal 3: Keenan Avendano will confidently and correctly walk with SPC on left side to off load his right hip when it becomes painful or stiff.      Long term goals  Time Frame for Long term goals : 8 weeks   Long term goal 1: Discharge with independent [facility]  Long term goal 2: Brie Johnson will be able to maintain neutral spine with all transfers and activities further minimizing stress placed on his lumbar spine   Long term goal 3: Brie Johnson will report minmal to no pain with all bed transfers    Renee Sigala, PT

## 2019-05-24 ENCOUNTER — HOSPITAL ENCOUNTER (OUTPATIENT)
Dept: PHYSICAL THERAPY | Age: 74
Setting detail: THERAPIES SERIES
Discharge: HOME | End: 2019-05-24
Payer: MEDICARE

## 2019-05-24 PROCEDURE — 97112 NEUROMUSCULAR REEDUCATION: CPT

## 2019-05-24 PROCEDURE — 97110 THERAPEUTIC EXERCISES: CPT

## 2019-05-24 NOTE — PROGRESS NOTES
[place]     Time In: 7490  Time Out: [address]  Minutes: 42  Timed Code Treatment Minutes: 42 Minutes                Date: 2019  Patient Name: Gene Ty,  Gender:  male        CSN: [id number]   : 1945  (72 y.o.)  Referral Date : 19    Referring Practitioner: Dr. Rd Aguirre      Diagnosis: Coletta Boor; Right sided back pain (M54.9)  Treatment Diagnosis: lumbago; low back pain; postural dysfunction                      General:  PT Visit Information  Onset Date: 19  PT Insurance Information: [employer]; Insurance pays at 95% once $2000 deductible is met; aquatic therapy covered; modalities covered except IONTO;   Total # of Visits to Date: 6  Plan of Care/Certification Expiration Date: 19  Progress Note Counter:                Subjective:  Chart Reviewed: Yes  Patient assessed for rehabilitation services?: Yes  Family / Caregiver Present: No  Comments: 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       Pain:  Patient Currently in Pain: Denies         Objective             Exercises  Exercise 1: Moist heat: SKC, Hamstring and LTR x 3 x 10 seconds  Exercise 2: Moist heat: Pelvic Tilt x10 hold x 5  Exercise 3: Moist heat: DLS in hooklying: Abdominal bracing x10 hold x 5, UE, marches, abd/ER orange band, adductor squeezes with ball x 10  Exercise 4: Seated DLS: LAQ's, marches, LAQ's x 10  Exercise 6: Seated with alternating UE/LE lifts while on heat: 10x  Exercise 7: // bars: side stepping for 4 lengths; marches for 4 lengths  Exercise 9: NuStep seat and arms at 10; level 4 for 6 min  Exercise 11: Airex: anterior/posterior wt shifts; marches: 10x each  Exercise 12: Gray disc: seated tilts and marches: 15x each   Exercise 13: Rocker board: anterior/posterior and lateral 10x each with 30 second hold at the end  Exercise 14: Chest press: level 3 15x         Activity Tolerance:  Activity Tolerance: Patient Tolerated treatment well  Activity Tolerance: Felt really good after today's session; he did fatigue though    Assessment: Body structures, Functions, Activity limitations: Decreased functional mobility , Decreased strength, Decreased endurance, Decreased ROM  Assessment: Samreen Sullivan is demonstrating improved gait and core stabilization since starting therapy. He is finding it easier to get in/out of bed, the car etc without pain. Discharge Recommendations: Continue to assess pending progress    Patient Education:  Patient Education: Stretches and DLS in supine and seated. Plan:  Times per week: 2x per week   Plan weeks: 8 weeks   Specific instructions for Next Treatment: Review pelvic tilt; check alignment; progress DLS program  Current Treatment Recommendations: Strengthening, ROM, Balance Training, Functional Mobility Training, Gait Training, Neuromuscular Re-education, Manual Therapy - Joint Manipulation, Manual Therapy - Soft Tissue Mobilization, Pain Management, Patient/Caregiver Education & Training, Home Exercise Program, Modalities, Aquatics    Goals:  Patient goals : Be able to get up and out of bed without pain/stiffness; resolve low back pain    Short term goals  Time Frame for Short term goals: 4 weeks   Short term goal 1: Mounika Zaidi will demonstrate good abdominal bracing and posture with all exercises, requiring minimal verbal cuing. Short term goal 2: Mounika Zaidi will report max pain level of 2-3/10 with all bed transfers and general mobility   Short term goal 3: Mounika Zaidi will confidently and correctly walk with SPC on left side to off load his right hip when it becomes painful or stiff.      Long term goals  Time Frame for Long term goals : 8 weeks   Long term goal 1: Discharge with independent [facility]  Long term goal 2: Mounika Zaidi will be able to maintain neutral spine with all transfers and activities further minimizing stress placed on his lumbar spine   Long term goal 3: Ara Miller will report minmal to no pain with all bed transfers    Christine Craven, PT

## 2019-05-29 ENCOUNTER — HOSPITAL ENCOUNTER (OUTPATIENT)
Dept: PHYSICAL THERAPY | Age: 74
Setting detail: THERAPIES SERIES
Discharge: HOME | End: 2019-05-29
Payer: MEDICARE

## 2019-05-29 PROCEDURE — 97110 THERAPEUTIC EXERCISES: CPT

## 2019-05-29 ASSESSMENT — PAIN DESCRIPTION - LOCATION: LOCATION: BACK

## 2019-05-29 ASSESSMENT — PAIN DESCRIPTION - ORIENTATION: ORIENTATION: RIGHT

## 2019-05-29 ASSESSMENT — PAIN SCALES - GENERAL: PAINLEVEL_OUTOF10: 4

## 2019-05-29 ASSESSMENT — PAIN DESCRIPTION - PAIN TYPE: TYPE: CHRONIC PAIN

## 2019-05-29 NOTE — PROGRESS NOTES
[place]     Time In: 1300  Time Out: 1340  Minutes: 40  Timed Code Treatment Minutes: 40 Minutes  Date: 2019  Patient Name: Niesha Saldana,  Gender:  male        CSN: [id number]   : 1945  (72 y.o.)  Referral Date : 19    Referring Practitioner: Dr. 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 States he is sore but \" I'm ok. \"      Pain:  Patient Currently in Pain: Yes  Pain Assessment: 0-10  Pain Level: 4  Pain Type: Chronic pain  Pain Location: Back  Pain Orientation: Right      Objective            Exercises  Exercise 1: Moist heat: SKC, Hamstring and LTR x 3 x 15 seconds  Exercise 2: Moist heat: Pelvic Tilt x10 hold x 5  Exercise 3: Moist heat: DLS in hooklying: Abdominal bracing x10 hold x 5, UE, marches, abd/ER orange band, adductor squeezes with ball x 10  Exercise 4: Seated on pad DLS: LAQ's, marches, LAQ's x 10  Exercise 9: NuStep seat and arms at 10; level 4 for 6 min  Exercise 10: hydrostick: push/pull; side to side; cw, ccw 10x each  Exercise 11: Airex: anterior/posterior wt shifts;  heel raises, marches, squats 10x each.  Not on foam 3 way hip, no band x 10  Exercise 12: Gray disc: seated tilts and marches: 15x each   Exercise 13: Rocker board: anterior/posterior and lateral 1x each with BUE then  30 second balance 1 UE  Exercise 14: Chest press: level 3 20x       Activity Tolerance: Tolerated well. Assessment: Body structures, Functions, Activity limitations: Decreased strength, Decreased endurance, Decreased ROM, Decreased ADL status  Assessment: Patient had less pain after session ending with moist heat. Addressed core strengthening and flexibility. Discussed importance of HEP. Discharge Recommendations: Continue to assess pending progress    Patient Education:  Patient Education: Stretches and DLS in supine and seated. Plan:  Times per week: 2x per week   Plan weeks: 8 weeks   Specific instructions for Next Treatment: Review pelvic tilt; check alignment; progress DLS program  Current Treatment Recommendations: Strengthening, ROM, Balance Training, Functional Mobility Training, Gait Training, Neuromuscular Re-education, Manual Therapy - Joint Manipulation, Manual Therapy - Soft Tissue Mobilization, Pain Management, Patient/Caregiver Education & Training, Home Exercise Program, Modalities, Aquatics    Goals:  Patient goals : Be able to get up and out of bed without pain/stiffness; resolve low back pain    Short term goals  Time Frame for Short term goals: 4 weeks   Short term goal 1: Brie Johnson will demonstrate good abdominal bracing and posture with all exercises, requiring minimal verbal cuing. Short term goal 2: Brie Johnson will report max pain level of 2-3/10 with all bed transfers and general mobility   Short term goal 3: Brie Johnson will confidently and correctly walk with SPC on left side to off load his right hip when it becomes painful or stiff.      Long term goals  Time Frame for Long term goals : 8 weeks   Long term goal 1: Discharge with independent HEP  Long term goal 2: Brie Johnson will be able to maintain neutral spine with all transfers and activities further minimizing stress placed on his lumbar spine   Long term goal 3: Alphonso Dk will report minmal to no pain with all bed transfers    Bentley Andrew  PTA [id number]

## 2019-05-31 ENCOUNTER — HOSPITAL ENCOUNTER (OUTPATIENT)
Dept: PHYSICAL THERAPY | Age: 74
Setting detail: THERAPIES SERIES
Discharge: HOME | End: 2019-05-31
Payer: MEDICARE

## 2019-05-31 PROCEDURE — 97530 THERAPEUTIC ACTIVITIES: CPT

## 2019-05-31 PROCEDURE — 97110 THERAPEUTIC EXERCISES: CPT

## 2019-05-31 NOTE — PROGRESS NOTES
[address]     Time In: 1300  Time Out: [address]  Minutes: 42  Timed Code Treatment Minutes: 42 Minutes                Date: 2019  Patient Name: Nicole Beltran,  Gender:  male        CSN: [id number]   : 1945  (72 y.o.)  Referral Date : 19    Referring Practitioner: Dr. 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  Getting up out of bed without issue     Pain:  Patient Currently in Pain: Yes         Objective               Balance  Posture: Good  Sitting - Static: Good, +  Sitting - Dynamic: Good, +  Standing - Static: Good, +  Standing - Dynamic: Good, +                    Exercises  Exercise 1: Moist heat: SKC, Hamstring and LTR x 3 x 15 seconds  Exercise 2: Moist heat: Pelvic Tilt x10 hold x 5  Exercise 3: Moist heat: DLS in hooklying: Abdominal bracing x10 hold x 5, UE, marches, abd/ER with blue ball , adductor squeezes with ball x 10  Exercise 4: Seated on pad DLS: LAQ's, marches, LAQ's x 10  Exercise 10: hydrostick: push/pull; side to side; cw, ccw 10x each  Exercise 12: Gray disc: seated tilts and marches: 15x each; alternating UE/LE lifts 15x  Exercise 14: Chest press: level 3 20x  Exercise 15: Seated on red ball-stabilizing with TA while bouncing up/down for 20 reps          Activity Tolerance:  Activity Tolerance: Patient Tolerated treatment well  Activity Tolerance: Will continue with therapy 1/week     Assessment: Body structures, Functions, Activity limitations: Decreased strength, Decreased endurance, Decreased ROM, Decreased ADL status  Assessment: Johny Rodriguez has demonstrated excellent gains in strength, ROM and general mobility since starting therapy. He is no longer dependent upon AD for general household mobility in the AM and he is bracing and maintaining neurtral spine 80% of the time with activity. He will continue to benefit from a few additional sessions of therapy to work on dynamic core strengthening as he prepare to get back on the riding mower to [facility] [facility] [employer]. Discharge Recommendations: Continue to assess pending progress    Patient Education:  Patient Education: Maintaining neutral spine while seated with up/down oscilliations                       Plan:  Times per week: 1x per week   Plan weeks: 4 weeks   Specific instructions for Next Treatment: Review pelvic tilt; check alignment; progress DLS program  Current Treatment Recommendations: Strengthening, ROM, Balance Training, Functional Mobility Training, Gait Training, Neuromuscular Re-education, Manual Therapy - Joint Manipulation, Manual Therapy - Soft Tissue Mobilization, Pain Management, Patient/Caregiver Education & Training, Home Exercise Program, Modalities, Aquatics    Goals:  Patient goals : Be able to get up and out of bed without pain/stiffness; resolve low back pain    Short term goals  Time Frame for Short term goals: 4 weeks   Short term goal 1: Robert Florian will demonstrate good abdominal bracing and posture with all exercises, requiring minimal verbal cuing.  GOAL MET   Short term goal 2: Robert Florian will report max pain level of 2-3/10 with all bed transfers and general mobility GOAL MET  Short term goal 3: Sterling Heaton will confidently and correctly walk with [facility] on left side to off load his right hip when it becomes painful or stiff.  GOAL MET-not using the cane at all-no need  Short term goal 4: Sterling Heaton will be able to get on/off his riding mower and mow for 60+ min at a time without pain-NEW GOAL    Long term goals  Time Frame for Long term goals : 8 weeks   Long term goal 1: Discharge with independent [facility] ONGOING  Long term goal 2: Sterling Heaton will be able to maintain neutral spine with all transfers and activities further minimizing stress placed on his lumbar spine GOAL MET   Long term goal 3: Sterling Heaton will report minmal to no pain with all bed transfers NOT MET: reports 3-5/10 pain with morning transfers     Dahlia Monroy, PT

## 2019-06-05 ENCOUNTER — HOSPITAL ENCOUNTER (OUTPATIENT)
Dept: PHYSICAL THERAPY | Age: 74
Setting detail: THERAPIES SERIES
Discharge: HOME | End: 2019-06-05
Payer: MEDICARE

## 2019-06-05 PROCEDURE — 97110 THERAPEUTIC EXERCISES: CPT

## 2019-06-05 NOTE — PROGRESS NOTES
[place]     Time In: 1300  Time Out: 1340  Minutes: 40  Timed Code Treatment Minutes: 40 Minutes           Date: 2019  Patient Name: Ewa Wynn,  Gender:  male        CSN: [id number]   : 1945  (72 y.o.)  Referral Date : 19    Referring Practitioner: Dr. 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 \"     Pain:  Patient Currently in Pain: No  Pain Assessment: 0-10    Objective  Exercises  Exercise 1: Moist heat: SKC, Hamstring and LTR x 3 x 15 seconds  Exercise 2: Moist heat: Pelvic Tilt x10 hold x 5  Exercise 9: NuStep seat and arms at 10; level 4 for 6 min  Exercise 10: hydrostick: push/pull; side to side; cw, ccw 15x each  Exercise 11: Airex: anterior/posterior wt shifts;  heel raises, marches, squats 15x each.  Not on foam 3 way hip, no band x 15  Exercise 12: Gray disc: seated tilts and marches: 15x each; alternating UE/LE lifts,  LAQ's,  Exercise 13: Rocker board: anterior/posterior and lateral 15x each with BUE then  30 second balance 1 UE  Exercise 15: Seated on red ball-stabilizing with TA while bouncing up/down for 20 reps Activity Tolerance: Tolerated well with no pain. Assessment: Body structures, Functions, Activity limitations: Decreased functional mobility , Decreased ROM, Decreased balance, Decreased strength, Decreased endurance  Assessment: Patient was able to increase reps with ex. Reminders for abdominal bracing. Note mild SOB. Patient reports stomach \"feels upset. \"        Patient Education:  Patient Education: Continue with stretches and ex. Use heat. Plan:  Times per week: 1x per week   Plan weeks: 4 weeks   Specific instructions for Next Treatment: Review pelvic tilt; check alignment; progress DLS program  Current Treatment Recommendations: Strengthening, ROM, Balance Training, Functional Mobility Training, Gait Training, Neuromuscular Re-education, Manual Therapy - Joint Manipulation, Manual Therapy - Soft Tissue Mobilization, Pain Management, Patient/Caregiver Education & Training, Home Exercise Program, Modalities, Aquatics    Goals:  Patient goals : Be able to get up and out of bed without pain/stiffness; resolve low back pain    Short term goals  Time Frame for Short term goals: 4 weeks   Short term goal 1: Minerva Gaston will demonstrate good abdominal bracing and posture with all exercises, requiring minimal verbal cuing. GOAL MET   Short term goal 2: Minerva Gaston will report max pain level of 2-3/10 with all bed transfers and general mobility GOAL MET  Short term goal 3: Minerva Gaston will confidently and correctly walk with SPC on left side to off load his right hip when it becomes painful or stiff.  GOAL MET-not using the cane at all-no need  Short term goal 4: Minerva Gaston will be able to get on/off his riding mower and mow for 60+ min at a time without pain-NEW GOAL    Long term goals  Time Frame for Long term goals : 8 weeks   Long term goal 1: Discharge with independent [facility] ONGOING  Long term goal 2: Minerav Gaston will be able to maintain neutral spine with all transfers and activities further minimizing stress placed on his lumbar spine GOAL MET   Long term goal 3: Esau Arm will report minmal to no pain with all bed transfers NOT MET: reports 3-5/10 pain with morning transfers     Gabi Clement  PTA [id number]

## 2019-06-11 ENCOUNTER — HOSPITAL ENCOUNTER (OUTPATIENT)
Dept: PHYSICAL THERAPY | Age: 74
Setting detail: THERAPIES SERIES
Discharge: HOME | End: 2019-06-11
Payer: MEDICARE

## 2019-06-14 ENCOUNTER — HOSPITAL ENCOUNTER (OUTPATIENT)
Dept: PHYSICAL THERAPY | Age: 74
Setting detail: THERAPIES SERIES
Discharge: HOME | End: 2019-06-14
Payer: MEDICARE

## 2019-06-14 PROCEDURE — 97530 THERAPEUTIC ACTIVITIES: CPT

## 2019-06-14 PROCEDURE — 97110 THERAPEUTIC EXERCISES: CPT

## 2019-06-14 NOTE — DISCHARGE SUMMARY
[address]     Time In: 1130  Time Out: 1155  Minutes: 25  Timed Code Treatment Minutes: 25 Minutes        Date: 2019  Patient Name: Ada Bruner,  Gender:  male        CSN: [id number]   : 1945  (69 y.o.)  Referral Date : 19    Referring Practitioner: Dr. Naga Evans      Diagnosis: Janel Going; Right sided back pain (M54.9)  Treatment Diagnosis: lumbago; low back pain; postural dysfunction     General:  PT Visit Information  Onset Date: 19  PT Insurance Information: [employer]; Insurance pays at 95% once $2000 deductible is met; aquatic therapy covered; modalities covered except IONTO;   Total # of Visits to Date: 10  Plan of Care/Certification Expiration Date: 19  Progress Note Counter:                Subjective:  Chart Reviewed: Yes  Patient assessed for rehabilitation services?: Yes  Family / Caregiver Present: No  Comments: Feeling good; agreeable to making today his last appt            Pain:  Patient Currently in Pain: No         Objective        Exercises  Exercise 1: Moist heat: SKC, Hamstring and LTR x 3 x 15 seconds  Exercise 2: Moist heat: Pelvic Tilt x10 hold x 5  Exercise 9: NuStep seat and arms at 10; level 4 for 6 min  Exercise 10: hydrostick: push/pull; side to side; cw, ccw 15x each  Exercise 12: Gray disc: seated tilts and marches: 15x each,  LAQ's,  Exercise 13: Rocker board: anterior/posterior and lateral 15x each with BUE then  30 second balance 1 UE         Activity Tolerance:  Activity Tolerance: Patient Tolerated treatment well  Activity Tolerance: Wanted to leave early-confident with HEP    Assessment: Body structures, Functions, Activity limitations: Decreased functional mobility , Decreased ROM, Decreased balance, Decreased strength, Decreased endurance  Assessment: Steffen Eduardo is discharged from therapy with independent HEP.  He has met all goals and is confident with his HEP. He has returned to [employer] again without restriction. No Skilled PT: Independent with functional mobility     Patient Education:  Patient Education: Review of HEP                      Plan:  Times per week: Discharged     Goals:  Patient goals : Be able to get up and out of bed without pain/stiffness; resolve low back pain    Short term goals  Time Frame for Short term goals: 4 weeks   Short term goal 1: Keshawn Hassan will demonstrate good abdominal bracing and posture with all exercises, requiring minimal verbal cuing. GOAL MET   Short term goal 2: Keshawn Hassan will report max pain level of 2-3/10 with all bed transfers and general mobility GOAL MET  Short term goal 3: Keshawn Hassan will confidently and correctly walk with SPC on left side to off load his right hip when it becomes painful or stiff.  GOAL MET-not using the cane at all-no need  Short term goal 4: Keshawn Hassan will be able to get on/off his riding mower and mow for 60+ min at a time without pain-GOAL MET     Long term goals  Time Frame for Long term goals : 8 weeks   Long term goal 1: Discharge with independent HEP ONGOING  Long term goal 2: Keshawn Hassan will be able to maintain neutral spine with all transfers and activities further minimizing stress placed on his lumbar spine GOAL MET   Long term goal 3: Keshawn Hassan will report minmal to no pain with all bed transfers GOAL MET: reports 2/10    Cherise Enriquez, PT

## 2019-06-19 ENCOUNTER — APPOINTMENT (OUTPATIENT)
Dept: PHYSICAL THERAPY | Age: 74
End: 2019-06-19
Payer: MEDICARE

## 2019-06-26 ENCOUNTER — APPOINTMENT (OUTPATIENT)
Dept: PHYSICAL THERAPY | Age: 74
End: 2019-06-26
Payer: MEDICARE

## 2019-07-21 LAB — PSA, ULTRASENSITIVE: 2.38 NG/ML (ref 0–4)

## 2019-07-23 ENCOUNTER — OFFICE VISIT (OUTPATIENT)
Dept: UROLOGY | Age: 74
End: 2019-07-23
Payer: MEDICARE

## 2019-07-23 VITALS
HEIGHT: 69 IN | BODY MASS INDEX: 34.41 KG/M2 | WEIGHT: 232.3 LBS | SYSTOLIC BLOOD PRESSURE: 138 MMHG | DIASTOLIC BLOOD PRESSURE: 84 MMHG

## 2019-07-23 DIAGNOSIS — N13.8 BPH WITH OBSTRUCTION/LOWER URINARY TRACT SYMPTOMS: Primary | ICD-10-CM

## 2019-07-23 DIAGNOSIS — N40.1 BPH WITH OBSTRUCTION/LOWER URINARY TRACT SYMPTOMS: Primary | ICD-10-CM

## 2019-07-23 LAB
BILIRUBIN URINE: NEGATIVE
BLOOD URINE, POC: NEGATIVE
CHARACTER, URINE: CLEAR
COLOR, URINE: YELLOW
GLUCOSE URINE: NEGATIVE MG/DL
KETONES, URINE: NEGATIVE
LEUKOCYTE CLUMPS, URINE: NEGATIVE
NITRITE, URINE: NEGATIVE
PH, URINE: 6.5 (ref 5–9)
POST VOID RESIDUAL (PVR): 8 ML
PROTEIN, URINE: NEGATIVE MG/DL
SPECIFIC GRAVITY, URINE: 1.01 (ref 1–1.03)
UROBILINOGEN, URINE: 0.2 EU/DL (ref 0–1)

## 2019-07-23 PROCEDURE — 81003 URINALYSIS AUTO W/O SCOPE: CPT | Performed by: NURSE PRACTITIONER

## 2019-07-23 PROCEDURE — 99213 OFFICE O/P EST LOW 20 MIN: CPT | Performed by: NURSE PRACTITIONER

## 2019-07-23 PROCEDURE — 51798 US URINE CAPACITY MEASURE: CPT | Performed by: NURSE PRACTITIONER

## 2019-07-23 NOTE — PROGRESS NOTES
[address]  [address]  [place] [address]  Dept: [phone]  Loc: [phone]  Visit Date: 7/23/2019      HPI:     Malu Barrientos f/u today for BPH & Elevated PSA. He needs to do well. He reports he consumes a lot of coffee as well as alcohol which leads to more urinary frequency. He remains on Flomax 0.4 mg daily which he is tolerating well. PVR is 8 ml. Urinalysis is negative   Trend of PSA:  2.38 07/2019  2.36 04/2019  2.19 07/2018  3.64 12/2017  2.08 02/2016  He comes in today by himself. Hx is obtained from the patient and medical record. Current Outpatient Medications   Medication Sig Dispense Refill    nitroGLYCERIN (NITROSTAT) 0.4 MG SL tablet Place 1 tablet under the tongue every 5 minutes as needed for Chest pain up to max of 3 total doses. If no relief after 1 dose, call 911. 25 tablet 1    metoprolol tartrate (LOPRESSOR) 25 MG tablet TAKE 1 TABLET BY MOUTH TWO TIMES A  tablet 3    atorvastatin (LIPITOR) 80 MG tablet TAKE 1 TABLET BY MOUTH ONE TIME A DAY AT NIGHT 90 tablet 3    prasugrel (EFFIENT) 10 MG TABS TAKE 1 TABLET BY MOUTH ONE TIME A DAY 90 tablet 3    lisinopril (PRINIVIL;ZESTRIL) 40 MG tablet Take 1 tablet by mouth daily 90 tablet 3    albuterol sulfate  (90 Base) MCG/ACT inhaler Inhale 2 puffs into the lungs every 4 hours as needed for Wheezing 3 Inhaler 1    amLODIPine (NORVASC) 10 MG tablet TAKE 1/2 TABLET BY MOUTH ONE TIME A DAY 45 tablet 3    escitalopram (LEXAPRO) 10 MG tablet Take 10 mg by mouth daily      NONFORMULARY       aspirin 81 MG chewable tablet Take 1 tablet by mouth daily 30 tablet 0    levothyroxine (SYNTHROID) 25 MCG tablet Take 25 mcg by mouth Daily      tamsulosin (FLOMAX) 0.4 MG capsule Take 0.4 mg by mouth daily       No current facility-administered medications for this visit.         Past Medical History  Hiwot Carrillo  has a past medical history of Angina at rest [facility]),

## 2019-11-26 ENCOUNTER — TELEPHONE (OUTPATIENT)
Dept: PULMONOLOGY | Age: 74
End: 2019-11-26

## 2020-02-18 ENCOUNTER — OFFICE VISIT (OUTPATIENT)
Dept: CARDIOLOGY CLINIC | Age: 75
End: 2020-02-18
Payer: MEDICARE

## 2020-02-18 VITALS
HEIGHT: 71 IN | SYSTOLIC BLOOD PRESSURE: 147 MMHG | BODY MASS INDEX: 32.62 KG/M2 | WEIGHT: 233 LBS | DIASTOLIC BLOOD PRESSURE: 92 MMHG | HEART RATE: 78 BPM

## 2020-02-18 PROCEDURE — 99213 OFFICE O/P EST LOW 20 MIN: CPT | Performed by: PHYSICIAN ASSISTANT

## 2020-02-18 PROCEDURE — 93000 ELECTROCARDIOGRAM COMPLETE: CPT | Performed by: PHYSICIAN ASSISTANT

## 2020-02-18 PROCEDURE — 99406 BEHAV CHNG SMOKING 3-10 MIN: CPT | Performed by: PHYSICIAN ASSISTANT

## 2020-02-18 RX ORDER — AMLODIPINE BESYLATE 10 MG/1
TABLET ORAL
Qty: 45 TABLET | Refills: 3 | Status: SHIPPED | OUTPATIENT
Start: 2020-02-18 | End: 2021-03-11 | Stop reason: SDUPTHER

## 2020-04-22 RX ORDER — ATORVASTATIN CALCIUM 80 MG/1
TABLET, FILM COATED ORAL
Qty: 90 TABLET | Refills: 1 | Status: SHIPPED | OUTPATIENT
Start: 2020-04-22 | End: 2021-03-11 | Stop reason: SDUPTHER

## 2020-04-22 RX ORDER — PRASUGREL 10 MG/1
TABLET, FILM COATED ORAL
Qty: 90 TABLET | Refills: 1 | Status: SHIPPED | OUTPATIENT
Start: 2020-04-22 | End: 2020-09-01 | Stop reason: SDUPTHER

## 2020-08-12 ENCOUNTER — OFFICE VISIT (OUTPATIENT)
Dept: UROLOGY | Age: 75
End: 2020-08-12
Payer: MEDICARE

## 2020-08-12 VITALS — BODY MASS INDEX: 31.54 KG/M2 | TEMPERATURE: 97.4 F | WEIGHT: 225.3 LBS | HEIGHT: 71 IN

## 2020-08-12 LAB
BILIRUBIN URINE: NEGATIVE
BLOOD URINE, POC: NEGATIVE
CHARACTER, URINE: CLEAR
COLOR, URINE: YELLOW
GLUCOSE URINE: NEGATIVE MG/DL
KETONES, URINE: NEGATIVE
LEUKOCYTE CLUMPS, URINE: NEGATIVE
NITRITE, URINE: NEGATIVE
PH, URINE: 6.5 (ref 5–9)
POST VOID RESIDUAL (PVR): 51 ML
PROTEIN, URINE: NEGATIVE MG/DL
SPECIFIC GRAVITY, URINE: 1.01 (ref 1–1.03)
UROBILINOGEN, URINE: 0.2 EU/DL (ref 0–1)

## 2020-08-12 PROCEDURE — 81003 URINALYSIS AUTO W/O SCOPE: CPT | Performed by: NURSE PRACTITIONER

## 2020-08-12 PROCEDURE — 51798 US URINE CAPACITY MEASURE: CPT | Performed by: NURSE PRACTITIONER

## 2020-08-12 PROCEDURE — 99213 OFFICE O/P EST LOW 20 MIN: CPT | Performed by: NURSE PRACTITIONER

## 2020-08-12 ASSESSMENT — ENCOUNTER SYMPTOMS
ABDOMINAL PAIN: 0
BACK PAIN: 0

## 2020-08-12 NOTE — PATIENT INSTRUCTIONS
You may receive a survey regarding the care you received during your visit. Your input is valuable to us. We encourage you to complete and return your survey. We hope you will choose us in the future for your healthcare needs. Patient Education        Stopping Smoking: Care Instructions  Your Care Instructions     Cigarette smokers crave the nicotine in cigarettes. Giving it up is much harder than simply changing a habit. Your body has to stop craving the nicotine. It is hard to quit, but you can do it. There are many tools that people use to quit smoking. You may find that combining tools works best for you. There are several steps to quitting. First you get ready to quit. Then you get support to help you. After that, you learn new skills and behaviors to become a nonsmoker. For many people, a necessary step is getting and using medicine. Your doctor will help you set up the plan that best meets your needs. You may want to attend a smoking cessation program to help you quit smoking. When you choose a program, look for one that has proven success. Ask your doctor for ideas. You will greatly increase your chances of success if you take medicine as well as get counseling or join a cessation program.  Some of the changes you feel when you first quit tobacco are uncomfortable. Your body will miss the nicotine at first, and you may feel short-tempered and grumpy. You may have trouble sleeping or concentrating. Medicine can help you deal with these symptoms. You may struggle with changing your smoking habits and rituals. The last step is the tricky one: Be prepared for the smoking urge to continue for a time. This is a lot to deal with, but keep at it. You will feel better. Follow-up care is a key part of your treatment and safety. Be sure to make and go to all appointments, and call your doctor if you are having problems.  It's also a good idea to know your test results and keep a list of the medicines you take.  How can you care for yourself at home? · Ask your family, friends, and coworkers for support. You have a better chance of quitting if you have help and support. · Join a support group, such as Nicotine Anonymous, for people who are trying to quit smoking. · Consider signing up for a smoking cessation program, such as the American Lung Association's Freedom from Smoking program.  · Get text messaging support. Go to the website at www.smokefree. gov to sign up for the [facility] program.  · Set a quit date. Pick your date carefully so that it is not right in the middle of a big deadline or stressful time. Once you quit, do not even take a puff. Get rid of all ashtrays and lighters after your last cigarette. Clean your house and your clothes so that they do not smell of smoke. · Learn how to be a nonsmoker. Think about ways you can avoid those things that make you reach for a cigarette. ? Avoid situations that put you at greatest risk for smoking. For some people, it is hard to have a drink with friends without smoking. For others, they might skip a coffee break with coworkers who smoke. ? Change your daily routine. Take a different route to work or eat a meal in a different place. · Cut down on stress. Calm yourself or release tension by doing an activity you enjoy, such as reading a book, taking a hot bath, or gardening. · Talk to your doctor or pharmacist about nicotine replacement therapy, which replaces the nicotine in your body. You still get nicotine but you do not use tobacco. Nicotine replacement products help you slowly reduce the amount of nicotine you need. These products come in several forms, many of them available over-the-counter:  ? Nicotine patches  ? Nicotine gum and lozenges  ? Nicotine inhaler  · Ask your doctor about bupropion (Wellbutrin) or varenicline (Chantix), which are prescription medicines. They do not contain nicotine.  They help you by reducing withdrawal symptoms, such as

## 2020-08-12 NOTE — PROGRESS NOTES
[address] [address]  Dept: [phone]  Loc: [phone]    Visit Date: 8/12/2020        HPI:     Kit Marinelli is a 70 y.o. male who presents today for:  Chief Complaint   Patient presents with    Benign Prostatic Hypertrophy     with luts        HPI   Pt seen in follow up for BPH and elevated PSA. Lazara Daniels is on tamsulosin 0.4 mg daily for BPH. PSA 2/21/20 2.98. He reports urinary symptoms stable. He only has urinary frequency and nocturia after he drinks his beer. Drinks at least 6-8 beers per day. Denies straining to empty. Admits to weakened stream.  No dysuria, hematuria. Current Outpatient Medications   Medication Sig Dispense Refill    atorvastatin (LIPITOR) 80 MG tablet TAKE 1 TABLET BY MOUTH ONE TIME A DAY AT NIGHT 90 tablet 1    metoprolol tartrate (LOPRESSOR) 25 MG tablet TAKE 1 TABLET BY MOUTH TWO TIMES A  tablet 3    lisinopril (PRINIVIL;ZESTRIL) 40 MG tablet Take 1 tablet by mouth daily 90 tablet 3    aspirin 81 MG chewable tablet Take 1 tablet by mouth daily 30 tablet 0    levothyroxine (SYNTHROID) 25 MCG tablet Take 25 mcg by mouth Daily      prasugrel (EFFIENT) 10 MG TABS TAKE 1 TABLET BY MOUTH ONE TIME A DAY  90 tablet 1    amLODIPine (NORVASC) 10 MG tablet TAKE 1 TABLET BY MOUTH ONE TIME A DAY 45 tablet 3    nitroGLYCERIN (NITROSTAT) 0.4 MG SL tablet Place 1 tablet under the tongue every 5 minutes as needed for Chest pain up to max of 3 total doses.  If no relief after 1 dose, call 911. 25 tablet 1    albuterol sulfate  (90 Base) MCG/ACT inhaler Inhale 2 puffs into the lungs every 4 hours as needed for Wheezing (Patient not taking: Reported on 2/18/2020) 3 Inhaler 1    escitalopram (LEXAPRO) 10 MG tablet Take 10 mg by mouth daily      NONFORMULARY       tamsulosin (FLOMAX) 0.4 MG capsule Take 0.4 mg by mouth daily       No current facility-administered medications for this visit. Past Medical History  Annelise Wang  has a past medical history of Angina at rest [facility]), Hyperlipidemia, Hypertension, Precordial pain, S/P cardiac cath: 1/25/2018: 70-75% mid-LAD. LCX OK. RCA 20%. LVEF 454-50%. LVEDP 12 mmHg., S/P PTCA: 1/25/2018: FFR-guided stenting of mid-LAD Xience 2.75x28 mm, post-dilated to 3.78 mm proximally. , and Thyroid disease. Past Surgical History  The patient  has a past surgical history that includes knee surgery (Bilateral); Cataract removal (Bilateral, 2017); joint replacement (Left, 2010); Percutaneous Transluminal Coronary Angio; and Cataract removal (Bilateral). Family History  This patient's family history includes Cancer in his mother; Heart Disease in his father. Social History  Annelise Wang  reports that he has been smoking cigarettes. He has a 25.00 pack-year smoking history. He quit smokeless tobacco use about 6 years ago. He reports current alcohol use. He reports that he does not use drugs. Subjective:      Review of Systems   Constitutional: Negative for activity change, appetite change, chills, diaphoresis, fatigue, fever and unexpected weight change. Gastrointestinal: Negative for abdominal pain. Genitourinary: Positive for frequency. Negative for decreased urine volume, difficulty urinating, dysuria, flank pain, hematuria and urgency. Musculoskeletal: Negative for back pain. Objective:   Temp 97.4 °F (36.3 °C)   Ht 5' 11\" (1.803 m)   Wt 225 lb 4.8 oz (102.2 kg)   BMI 31.42 kg/m²     Physical Exam  Constitutional:       General: He is not in acute distress. Appearance: Normal appearance. He is not ill-appearing or diaphoretic. HENT:      Head: Normocephalic and atraumatic. Right Ear: External ear normal.      Left Ear: External ear normal.      Nose: Nose normal.      Mouth/Throat:      Mouth: Mucous membranes are moist.   Eyes:      General: No scleral icterus. Right eye: No discharge.          Left eye: No

## 2020-09-01 ENCOUNTER — OFFICE VISIT (OUTPATIENT)
Dept: CARDIOLOGY CLINIC | Age: 75
End: 2020-09-01
Payer: MEDICARE

## 2020-09-01 VITALS
WEIGHT: 229.2 LBS | BODY MASS INDEX: 32.09 KG/M2 | HEART RATE: 72 BPM | HEIGHT: 71 IN | SYSTOLIC BLOOD PRESSURE: 158 MMHG | DIASTOLIC BLOOD PRESSURE: 82 MMHG

## 2020-09-01 PROCEDURE — 99406 BEHAV CHNG SMOKING 3-10 MIN: CPT | Performed by: NURSE PRACTITIONER

## 2020-09-01 PROCEDURE — 99213 OFFICE O/P EST LOW 20 MIN: CPT | Performed by: NURSE PRACTITIONER

## 2020-09-01 RX ORDER — LISINOPRIL 10 MG/1
10 TABLET ORAL 2 TIMES DAILY
Status: ON HOLD | COMMUNITY
End: 2021-08-27 | Stop reason: HOSPADM

## 2020-09-01 RX ORDER — PRASUGREL 10 MG/1
10 TABLET, FILM COATED ORAL DAILY
Qty: 90 TABLET | Refills: 1 | Status: SHIPPED | OUTPATIENT
Start: 2020-09-01 | End: 2021-03-11 | Stop reason: SDUPTHER

## 2020-09-01 NOTE — PROGRESS NOTES
[facility] PROFESSIONAL SERVICES  [facility]   [address]    [address] [address]   Dept: [phone]   Dept Fax: [phone]   Loc: [phone]      Chief Complaint   Patient presents with    6 Month Follow-Up     6 month f/u in 69 y/o male continues to smoke 2 packs cigarettes per day with history of CAD/PCI of LAD 2018, HTN, HLD. Denies chest pain, palpitations, lightheadedness, dizziness or syncope. Has chronic intermittent sob with exertion that is no worse or more frequent and he reports it is from his daily cigarette smoking. He has been using more salt on his food and  Has noticed some increased swelling of his lower extremities. He notes he had dizziness last year that he no longer has. Cardiologist:  Dr. Andrea Pat:   No fever, no chills, No fatigue or weight loss  Pulmonary:    +chronic intermittent dyspnea with exertion, no wheezing  Cardiac:    Denies recent chest pain   GI:     No nausea or vomiting, no abdominal pain  Neuro:    No dizziness or light headedness  Musculoskeletal:  No recent active issues  Extremities:   BLE non-pitting edema, good peripheral pulses      Past Medical History:   Diagnosis Date    Angina at rest [facility]) 1/17/2018    Hyperlipidemia     Hypertension     Precordial pain 1/17/2018    S/P cardiac cath: 1/25/2018: 70-75% mid-LAD. LCX OK. RCA 20%. LVEF 454-50%. LVEDP 12 mmHg. 1/25/2018 1/25/2018: 70-75% mid-LAD. LCX OK. RCA 20%. LVEF 454-50%. LVEDP 12 mmHg. Dr. Mike Ghosh S/P PTCA: 1/25/2018: FFR-guided stenting of mid-LAD Xience 2.75x28 mm, post-dilated to 3.78 mm proximally. 1/25/2018 1/25/2018: FFR-guided stenting of mid-LAD Xience 2.75x28 mm, post-dilated to 3.78 mm proximally.  Dr. Mike Ghosh Thyroid disease        No Known Allergies    Current Outpatient Medications   Medication Sig Dispense Refill    lisinopril (PRINIVIL;ZESTRIL) 10 MG tablet Take 10 mg by mouth 2 times daily      prasugrel (EFFIENT) 10 MG TABS Take 1 tablet by mouth daily 90 tablet 1    atorvastatin (LIPITOR) 80 MG tablet TAKE 1 TABLET BY MOUTH ONE TIME A DAY AT NIGHT 90 tablet 1    amLODIPine (NORVASC) 10 MG tablet TAKE 1 TABLET BY MOUTH ONE TIME A DAY 45 tablet 3    nitroGLYCERIN (NITROSTAT) 0.4 MG SL tablet Place 1 tablet under the tongue every 5 minutes as needed for Chest pain up to max of 3 total doses. If no relief after 1 dose, call 911. 25 tablet 1    metoprolol tartrate (LOPRESSOR) 25 MG tablet TAKE 1 TABLET BY MOUTH TWO TIMES A  tablet 3    aspirin 81 MG chewable tablet Take 1 tablet by mouth daily 30 tablet 0    levothyroxine (SYNTHROID) 25 MCG tablet Take 25 mcg by mouth Daily      tamsulosin (FLOMAX) 0.4 MG capsule Take 0.4 mg by mouth daily       No current facility-administered medications for this visit.         Social History     Socioeconomic History    Marital status:      Spouse name: None    Number of children: None    Years of education: None    Highest education level: None   Occupational History    None   Social Needs    Financial resource strain: None    Food insecurity     Worry: None     Inability: None    Transportation needs     Medical: None     Non-medical: None   Tobacco Use    Smoking status: Current Every Day Smoker     Packs/day: 1.00     Years: 25.00     Pack years: 25.00     Types: Cigarettes    Smokeless tobacco: Former User     Quit date: 2014   Substance and Sexual Activity    Alcohol use: Yes     Comment: 6-10 beers daily    Drug use: No    Sexual activity: Never   Lifestyle    Physical activity     Days per week: None     Minutes per session: None    Stress: None   Relationships    Social connections     Talks on phone: None     Gets together: None     Attends Yazdanism service: None     Active member of club or organization: None     Attends meetings of clubs or organizations: None     Relationship status: None    Intimate partner violence     Fear of current or ex partner: None     Emotionally abused: None     Physically abused: None     Forced sexual activity: None   Other Topics Concern    None   Social History Narrative    None       Family History   Problem Relation Age of Onset    Cancer Mother         breast    Heart Disease Father        Blood pressure (!) 158/82, pulse 72, height 5' 11\" (1.803 m), weight 229 lb 3.2 oz (104 kg). General:   Well developed, well nourished  Lungs:   Clear to auscultation  Heart:    Normal S1 S2, No murmur, rubs, or gallops  Abdomen:   Soft, non tender, no organomegalies, positive bowel sounds  Extremities:   No edema, no cyanosis, good peripheral pulses  Neurological:   Awake, alert, oriented. No obvious focal deficits  Musculoskeletal:  No obvious deformities    EKG:   Procedure Summary  Successful FFR-guided PCI/stenting of the 70-75% calcified lesion in the mid-LAD artery using LISA Xience 2.75 x 28  mm, post-dilated to 3.78 mm proximally. Normal LM is good. LAD is moderately calcified, with moderate atherosclerotic lesions in the proximal segment and long concentric lesion  with angiographic 70-75% narrowing. Normal non-dominant LCx. Dominant RCA with 20% smooth lesion in the mid segment. Low normal LV systolic function with LVEF estimated at 45-50%. Recommendations  Patient and family were informed about the findings and their questions were answered to their satisfaction. The importance of lifestyle changes and cardiovascular risk factors modification was emphasized. Smoking cessation was emphasized. The importance of compliance with medications was emphasized. The patient will be on dual antiplatelet therapy for at least one year. The patient will be on optimal medical therapy for atherosclerotic disease, including beta blockers as tolerated,  statins, and ace inhibitors. Estimated Blood Loss: 5 ml.   Signatures  Electronically signed by Fareed Maurice MD    Echo: 8/24/18      Summary   Ejection fraction was

## 2021-02-26 ENCOUNTER — IMMUNIZATION (OUTPATIENT)
Dept: PRIMARY CARE CLINIC | Age: 76
End: 2021-02-26
Payer: MEDICARE

## 2021-02-26 PROCEDURE — 91300 COVID-19, PFIZER VACCINE 30MCG/0.3ML DOSE: CPT

## 2021-02-26 PROCEDURE — 0001A COVID-19, PFIZER VACCINE 30MCG/0.3ML DOSE: CPT

## 2021-03-09 ENCOUNTER — HOSPITAL ENCOUNTER (OUTPATIENT)
Dept: NON INVASIVE DIAGNOSTICS | Age: 76
Discharge: HOME | End: 2021-03-09
Payer: MEDICARE

## 2021-03-09 DIAGNOSIS — I25.10 CORONARY ARTERY DISEASE INVOLVING NATIVE CORONARY ARTERY OF NATIVE HEART WITHOUT ANGINA PECTORIS: ICD-10-CM

## 2021-03-09 LAB
LV EF: 58 %
LVEF MODALITY: NORMAL

## 2021-03-09 PROCEDURE — 93306 TTE W/DOPPLER COMPLETE: CPT

## 2021-03-11 ENCOUNTER — OFFICE VISIT (OUTPATIENT)
Dept: CARDIOLOGY CLINIC | Age: 76
End: 2021-03-11
Payer: MEDICARE

## 2021-03-11 VITALS
HEART RATE: 68 BPM | WEIGHT: 240 LBS | HEIGHT: 71 IN | DIASTOLIC BLOOD PRESSURE: 72 MMHG | SYSTOLIC BLOOD PRESSURE: 130 MMHG | BODY MASS INDEX: 33.6 KG/M2

## 2021-03-11 DIAGNOSIS — R60.0 LOWER EXTREMITY EDEMA: ICD-10-CM

## 2021-03-11 DIAGNOSIS — R06.02 SHORTNESS OF BREATH: Primary | ICD-10-CM

## 2021-03-11 PROCEDURE — 93000 ELECTROCARDIOGRAM COMPLETE: CPT | Performed by: INTERNAL MEDICINE

## 2021-03-11 PROCEDURE — 99214 OFFICE O/P EST MOD 30 MIN: CPT | Performed by: INTERNAL MEDICINE

## 2021-03-11 RX ORDER — FUROSEMIDE 40 MG/1
40 TABLET ORAL DAILY
Qty: 90 TABLET | Refills: 1 | Status: SHIPPED | OUTPATIENT
Start: 2021-03-11 | End: 2021-08-03

## 2021-03-11 RX ORDER — ATORVASTATIN CALCIUM 80 MG/1
TABLET, FILM COATED ORAL
Qty: 90 TABLET | Refills: 3 | Status: SHIPPED | OUTPATIENT
Start: 2021-03-11 | End: 2022-01-18

## 2021-03-11 RX ORDER — AMLODIPINE BESYLATE 10 MG/1
TABLET ORAL
Qty: 90 TABLET | Refills: 3 | Status: SHIPPED | OUTPATIENT
Start: 2021-03-11 | End: 2021-08-03 | Stop reason: DRUGHIGH

## 2021-03-11 RX ORDER — ESCITALOPRAM OXALATE 10 MG/1
10 TABLET ORAL DAILY
COMMUNITY
End: 2021-10-05

## 2021-03-11 RX ORDER — POTASSIUM CHLORIDE 20 MEQ/1
20 TABLET, EXTENDED RELEASE ORAL DAILY
Qty: 90 TABLET | Refills: 1 | Status: SHIPPED | OUTPATIENT
Start: 2021-03-11 | End: 2021-08-03

## 2021-03-11 RX ORDER — PRASUGREL 10 MG/1
10 TABLET, FILM COATED ORAL DAILY
Qty: 90 TABLET | Refills: 3 | Status: SHIPPED | OUTPATIENT
Start: 2021-03-11

## 2021-03-11 NOTE — PROGRESS NOTES
1/25/2018: 70-75% mid-LAD. LCX OK. RCA 20%. LVEF 454-50%. LVEDP 12 mmHg., S/P PTCA: 1/25/2018: FFR-guided stenting of mid-LAD Xience 2.75x28 mm, post-dilated to 3.78 mm proximally. , and Thyroid disease. Social History  Jenae Pierson  reports that he has been smoking cigarettes. He has a 25.00 pack-year smoking history. He quit smokeless tobacco use about 7 years ago. He reports current alcohol use. He reports that he does not use drugs. Family History  Jenae Pierson family history includes Cancer in his mother; Heart Disease in his father. There is no family history of bicuspid aortic valve, aneurysms, heart transplant, pacemakers, defibrillators, or sudden cardiac death. Past Surgical History   Past Surgical History:   Procedure Laterality Date    CATARACT REMOVAL Bilateral 2017    CATARACT REMOVAL Bilateral     JOINT REPLACEMENT Left 2010    KNEE SURGERY Bilateral     scopes    PTCA         Review of Systems   Constitutional: Negative for chills and fever  HENT: Negative for congestion, sinus pressure, sneezing and sore throat. Eyes: Negative for pain, discharge, redness and itching. Respiratory: Negative for apnea, cough  Gastrointestinal: Negative for blood in stool, constipation, diarrhea   Endocrine: Negative for cold intolerance, heat intolerance, polydipsia. Genitourinary: Negative for dysuria, enuresis, flank pain and hematuria. Musculoskeletal: Negative for arthralgias, joint swelling and neck pain. Neurological: Negative for numbness and headaches. Psychiatric/Behavioral: Negative for agitation, confusion, decreased concentration and dysphoric mood.      Objective:     /72   Pulse 68   Ht 5' 11\" (1.803 m)   Wt 240 lb (108.9 kg)   BMI 33.47 kg/m²     Wt Readings from Last 3 Encounters:   03/11/21 240 lb (108.9 kg)   09/01/20 229 lb 3.2 oz (104 kg)   08/12/20 225 lb 4.8 oz (102.2 kg)     BP Readings from Last 3 Encounters:   03/11/21 130/72   09/01/20 (!) 158/82   02/18/20 (!) 147/92 Nursing note and vitals reviewed. Physical Exam   Constitutional: Oriented to person, place, and time. Appears well-developed and well-nourished. HENT:   Head: Normocephalic and atraumatic. Eyes: EOM are normal. Pupils are equal, round, and reactive to light. Neck: Normal range of motion. Neck supple. No JVD present. Cardiovascular: Normal rate, regular rhythm, normal heart sounds and intact distal pulses. No murmur heard. Pulmonary/Chest: Effort normal and breath sounds normal. No respiratory distress. No wheezes. No rales. Abdominal: Soft. Bowel sounds are normal. No distension. There is no tenderness. Musculoskeletal: Normal range of motion. 3-4+ edema. Neurological: Alert and oriented to person, place, and time. No cranial nerve deficit. Coordination normal.   Skin: Skin is warm and dry. Psychiatric: Normal mood and affect.        No results found for: CKTOTAL, CKMB, CKMBINDEX    Lab Results   Component Value Date    WBC <1 05/08/2020    WBC 7.6 05/29/2018    RBC <1 05/08/2020    HGB 16.7 02/21/2020    HCT 47.4 02/21/2020     02/21/2020    MCH 35.3 02/21/2020    MCHC 35.2 02/21/2020    RDW 12.8 02/21/2020     02/21/2020     07/06/2018    MPV 8.4 02/21/2020       Lab Results   Component Value Date     09/21/2020    K 4.3 09/21/2020    K 4.0 01/25/2018    CL 96 09/21/2020    CO2 27 09/21/2020    BUN 10 09/21/2020    LABALBU 4.6 02/21/2020    CREATININE 0.88 09/21/2020    CALCIUM 9.3 09/21/2020    LABGLOM >90 05/29/2018    GLUCOSE 107 09/21/2020       Lab Results   Component Value Date    ALKPHOS 76 02/21/2020    ALKPHOS 72 05/29/2018    ALT 30 02/21/2020    AST 30 02/21/2020    PROT 7.4 02/21/2020    BILITOT Negative 05/08/2020    LABALBU 4.6 02/21/2020       No results found for: MG    Lab Results   Component Value Date    INR 1.00 01/26/2018         Lab Results   Component Value Date    LABA1C 5.3 02/21/2020       Lab Results   Component Value Date    TRIG 109 02/21/2020    HDL 52 02/21/2020    LDLCALC 25 02/21/2020    LABVLDL 22 02/21/2020       Lab Results   Component Value Date    TSH 1.62 09/21/2020         Testing Reviewed:      I have individually reviewed the cardiac test below:    ECHO:   Results for orders placed during the hospital encounter of 08/24/18   Echo 2D w doppler w color complete    Narrative Transthoracic Echocardiography Report (TTE)     Demographics      Patient Name    Gretel Hunt Gender                Male      MR #            [id number]     Race                                                      Ethnicity      Account #       [de-identified]     Room Number      Accession       [id number]     Date of Study         08/24/2018   Number      Date of Birth   1945    Referring Physician   Jose A Shen MD      Age             72 year(s)    Rupali Forrest MD                                 Physician     Procedure    Type of Study      TTE procedure:ECHOCARDIOGRAM COMPLETE 2D W DOPPLER W COLOR. Procedure Date  Date: 08/24/2018 Start: 02:28 PM    Study Location: Echo Lab  Technical Quality: Adequate visualization    Indications:Dyspnea on exertion. Additional Medical History:Coronary artery disease, Stent, Hypertension,  Hyperlipidemia, COPD, Precorsial pain, Tobacco use, Family history of heart  disease    Patient Status: Routine    Height: 70.87 inches Weight: 223.01 pounds BSA: 2.21 m^2 BMI: 31.22 kg/m^2    BP: 140/84 mmHg    Allergies    - No Known Allergies. Conclusions      Summary   Ejection fraction was estimated at 50-55%. There were no regional left ventricular wall motion abnormalities and wall   thickness was within normal limits.       Signature ----------------------------------------------------------------   Electronically signed by Hazel Ramirez MD (Interpreting   physician) on 08/24/2018 at 04:44 PM   ----------------------------------------------------------------      Findings      Mitral Valve   The mitral valve structure was normal with normal leaflet separation. DOPPLER: The transmitral velocity was within the normal range with no   evidence for mitral stenosis. There was no evidence of mitral   regurgitation. Aortic Valve   The aortic valve was trileaflet with normal thickness and cuspal   separation. DOPPLER: Transaortic velocity was within the normal range with   no evidence of aortic stenosis. There was no evidence of aortic   regurgitation. Tricuspid Valve   The tricuspid valve structure was normal with normal leaflet separation. DOPPLER: There was no evidence of tricuspid stenosis. There was trace   tricuspid regurgitation. Pulmonic Valve   The pulmonic valve leaflets were not well seen. DOPPLER: The transpulmonic   velocity was within the normal range with no evidence for regurgitation. Left Atrium   Left atrial size was normal.      Left Ventricle   Normal left ventricle size and systolic function. Ejection fraction was   estimated at 50-55%. There were no regional left ventricular wall motion   abnormalities and wall thickness was within normal limits. E/A flow reversal noted. Suggestive of diastolic dysfunction. Right Atrium   Right atrial size was normal.      Right Ventricle   The right ventricular size was normal with normal systolic function and   wall thickness. Pericardial Effusion   The pericardium was normal in appearance with no evidence of a pericardial   effusion. Pleural Effusion   No evidence of pleural effusion. Aorta / Great Vessels   IVC not well seen.      M-Mode/2D Measurements & Calculations      LV Diastolic   LV Systolic Dimension:    AV Cusp Separation: 1.8 cmLA Dimension: 5.1 3.7 cm                    Dimension: 3.6 cmAO Root   cm             LV Volume Diastolic: 233  Dimension: 3.2 cmLA Area: 16.4   LV FS:27.5 %   ml                        cm^2   LV PW          LV Volume Systolic: 24.7   Diastolic: 0.9 ml   cm             LV EDV/LV EDV Index: 124   Septum         ml/56 m^2LV ESV/LV ESV    RV Diastolic Dimension: 3 cm   Diastolic: 1   Index: 17.8 ml/26 m^2   cm             EF Calculated: 53.2 %     LA/Aorta: 1.12                                               LA volume/Index: 42 ml /19m^2     Doppler Measurements & Calculations      MV Peak E-Wave: 64.5 cm/s   AV Peak Velocity: 153  LVOT Peak Velocity: 114   MV Peak A-Wave: 91.2 cm/s   cm/s                   cm/s   MV E/A Ratio: 0.71          AV Peak Gradient: 9.36 LVOT Peak Gradient: 5   MV Peak Gradient: 1.66 mmHg mmHg                   mmHg      MV Deceleration Time: 363                          TV Peak E-Wave: 47.5   msec                                               cm/s                                                      TV Peak A-Wave: 37.8                               IVRT: 102 msec         cm/s   MV E' Septal Velocity: 6.9   cm/s                                               TV Peak Gradient: 0.9   MV A' Septal Velocity: 12   AV DVI (Vmax):0.75     mmHg   cm/s   MV E' Lateral Velocity: 9.9                        PV Peak Velocity: 87.8   cm/s                                               cm/s   MV A' Lateral Velocity:                            PV Peak Gradient: 3.08   14.4 cm/s                                          mmHg   E/E' septal: 9.35   E/E' lateral: 6.52     [id number]        Assessment/Plan   Chronic Diastolic Dysfunction, NYHA II   LE edema  LAD PCI 2018  + smoker  EF 55-60%  Grade 1DD  ECG with trifasicular block   Add Lasix 40 mg q day, compression stockings, has some degree of venous insufficiency, monitor swelling. KCL replacement. BMP 1 week. May need [address] for venous disease. Also has some degree of lymphedema. BP and HR well controlled. CHF clinid in 3 months. May need to stop Amlodipine. Discussed diet/exercise/BP/weight loss/health lifestyle choices/lipids; the patient understands the goals and will try to comply.     Disposition:  6 months         Electronically signed by Alise Hernandez MD   3/11/2021 at 9:48 AM EST

## 2021-03-19 ENCOUNTER — IMMUNIZATION (OUTPATIENT)
Dept: PRIMARY CARE CLINIC | Age: 76
End: 2021-03-19
Payer: MEDICARE

## 2021-03-19 PROCEDURE — 0002A COVID-19, PFIZER VACCINE 30MCG/0.3ML DOSE: CPT | Performed by: FAMILY MEDICINE

## 2021-03-19 PROCEDURE — 91300 COVID-19, PFIZER VACCINE 30MCG/0.3ML DOSE: CPT | Performed by: FAMILY MEDICINE

## 2021-05-10 ENCOUNTER — TELEPHONE (OUTPATIENT)
Dept: CARDIOLOGY CLINIC | Age: 76
End: 2021-05-10

## 2021-06-07 ENCOUNTER — TELEPHONE (OUTPATIENT)
Dept: CARDIOLOGY CLINIC | Age: 76
End: 2021-06-07

## 2021-06-07 NOTE — TELEPHONE ENCOUNTER
Pt. Called today to cancel his appt. With Galina at 1pm due to UTI. Patient stated he will call back later to r/s.

## 2021-08-03 ENCOUNTER — OFFICE VISIT (OUTPATIENT)
Dept: CARDIOLOGY CLINIC | Age: 76
End: 2021-08-03
Payer: MEDICARE

## 2021-08-03 VITALS
HEIGHT: 71 IN | DIASTOLIC BLOOD PRESSURE: 72 MMHG | HEART RATE: 80 BPM | WEIGHT: 229.5 LBS | OXYGEN SATURATION: 97 % | SYSTOLIC BLOOD PRESSURE: 122 MMHG | BODY MASS INDEX: 32.13 KG/M2

## 2021-08-03 DIAGNOSIS — I20.8 OTHER FORMS OF ANGINA PECTORIS (HCC): ICD-10-CM

## 2021-08-03 DIAGNOSIS — I50.32 CHRONIC DIASTOLIC CHF (CONGESTIVE HEART FAILURE), NYHA CLASS 2 (HCC): Primary | ICD-10-CM

## 2021-08-03 DIAGNOSIS — R60.0 BILATERAL LOWER EXTREMITY EDEMA: ICD-10-CM

## 2021-08-03 PROCEDURE — 99214 OFFICE O/P EST MOD 30 MIN: CPT | Performed by: PHYSICIAN ASSISTANT

## 2021-08-03 RX ORDER — SPIRONOLACTONE 25 MG/1
25 TABLET ORAL DAILY
Qty: 30 TABLET | Refills: 3 | Status: SHIPPED | OUTPATIENT
Start: 2021-08-03 | End: 2021-08-19

## 2021-08-03 RX ORDER — AMLODIPINE BESYLATE 5 MG/1
TABLET ORAL
Qty: 30 TABLET | Refills: 1 | Status: SHIPPED | OUTPATIENT
Start: 2021-08-03

## 2021-08-03 NOTE — PATIENT INSTRUCTIONS
Decrease norvasc to 5mg po daily  Add aldactone 25mg po daily  Stop potassium  BMP 2 weeks  Doppler ble to r/o DVT  Declined lymphedema clinic  Toño dong on during day and off at night  F/up chf clinic 2 months  Keep f/up appt with dr Yina Starkey

## 2021-08-11 ENCOUNTER — HOSPITAL ENCOUNTER (OUTPATIENT)
Dept: INTERVENTIONAL RADIOLOGY/VASCULAR | Age: 76
Discharge: HOME | End: 2021-08-11
Payer: MEDICARE

## 2021-08-11 DIAGNOSIS — R60.0 BILATERAL LOWER EXTREMITY EDEMA: ICD-10-CM

## 2021-08-11 PROCEDURE — 93970 EXTREMITY STUDY: CPT

## 2021-08-13 LAB
ANION GAP SERPL CALCULATED.3IONS-SCNC: 10 MMOL/L (ref 5–15)
BUN BLDV-MCNC: 11 MG/DL (ref 5–27)
CALCIUM SERPL-MCNC: 9 MG/DL (ref 8.5–10.5)
CHLORIDE BLD-SCNC: 90 MMOL/L (ref 98–109)
CO2: 24 MMOL/L (ref 22–32)
CREAT SERPL-MCNC: 0.88 MG/DL (ref 0.6–1.3)
EGFR AFRICAN AMERICAN: >60 ML/MIN/1.73SQ.M
EGFR IF NONAFRICAN AMERICAN: >60 ML/MIN/1.73SQ.M
GLUCOSE: 97 MG/DL (ref 65–99)
POTASSIUM SERPL-SCNC: 5 MMOL/L (ref 3.5–5)
PSA, ULTRASENSITIVE: 3.79 NG/ML (ref 0–4)
SODIUM BLD-SCNC: 124 MMOL/L (ref 134–146)

## 2021-08-16 ENCOUNTER — TELEPHONE (OUTPATIENT)
Dept: CARDIOLOGY CLINIC | Age: 76
End: 2021-08-16

## 2021-08-16 DIAGNOSIS — I50.32 CHRONIC DIASTOLIC CHF (CONGESTIVE HEART FAILURE), NYHA CLASS 2 (HCC): Primary | ICD-10-CM

## 2021-08-16 NOTE — TELEPHONE ENCOUNTER
See BMP 8/13/21    Glucose 97  65 - 99 mg/dL Final 08/13/2021  7:10 AM [facility]   BUN 11  5 - 27 mg/dL Final 08/13/2021  7:10 AM [facility]   CREATININE 0.88  0.60 - 1.30 mg/dL Final 08/13/2021  7:10 AM [facility]   METHOD TRACEABLE TO IDMS STANDARD   eGFR African American >60  >59 ml/min/1.73sq. m Final 08/13/2021  7:10 AM [facility]   EGFR IF NonAfrican American >60  >59 ml/min/1.73sq. m Final 08/13/2021  7:10 AM [facility]   Calcium 9.0  8.5 - 10.5 mg/dL Final 08/13/2021  7:10 AM [facility]   Sodium 124Low   134 - 146 mmol/L Final 08/13/2021  7:10 AM [facility]   Potassium 5.0  3.5 - 5.0 mmol/L Final 08/13/2021  7:10 AM [facility]   Chloride 90Low   98 - 109 mmol/L Final 08/13/2021  7:10 AM [facility]   CO2 24  22 - 32 mmol/L Final 08/13/2021  7:10 AM [facility]   Anion Gap 10  5 - 15 mmol/L Final 08/13/2021  7:10 AM [facility]        Ellouise Colace see results?

## 2021-08-19 NOTE — TELEPHONE ENCOUNTER
Stop Aldactone. Recheck BMP in 1 week. 48 oz/day fluid restriction - ETOH use? - abstain if so  Any wt gain, swelling? ? May need to resume Lasix.

## 2021-08-19 NOTE — TELEPHONE ENCOUNTER
Spoke with patient   He drinks 7-8 beers/days  Swelling is better   Not wearing stocking  Patient notified of recommendations   Verbalized understanding   Lab slip faxed to path lab on [address]

## 2021-08-23 NOTE — TELEPHONE ENCOUNTER
We did not receive lab work results. Called to patient. He did not have them done yet. Asked if he could go this morning and patient said he is not feeling the best. Educated patient on importance of having the labs redrawn with the low sodium results. He will \"try to go soon\".

## 2021-08-24 ENCOUNTER — HOSPITAL ENCOUNTER (INPATIENT)
Age: 76
LOS: 3 days | Discharge: HOME | DRG: 641 | End: 2021-08-27
Attending: EMERGENCY MEDICINE | Admitting: INTERNAL MEDICINE
Payer: MEDICARE

## 2021-08-24 ENCOUNTER — APPOINTMENT (OUTPATIENT)
Dept: GENERAL RADIOLOGY | Age: 76
DRG: 641 | End: 2021-08-24
Payer: MEDICARE

## 2021-08-24 DIAGNOSIS — R00.1 BRADYCARDIA: ICD-10-CM

## 2021-08-24 DIAGNOSIS — J98.4 CAVITARY LUNG DISEASE: ICD-10-CM

## 2021-08-24 DIAGNOSIS — E87.1 HYPONATREMIA: ICD-10-CM

## 2021-08-24 DIAGNOSIS — R53.83 FATIGUE, UNSPECIFIED TYPE: Primary | ICD-10-CM

## 2021-08-24 LAB
ALBUMIN SERPL-MCNC: 4.4 G/DL (ref 3.5–5.1)
ALP BLD-CCNC: 90 U/L (ref 38–126)
ALT SERPL-CCNC: 46 U/L (ref 11–66)
ANION GAP SERPL CALCULATED.3IONS-SCNC: 10 MMOL/L (ref 5–15)
ANION GAP SERPL CALCULATED.3IONS-SCNC: 11 MEQ/L (ref 8–16)
AST SERPL-CCNC: 37 U/L (ref 5–40)
BASOPHILS # BLD: 0.6 %
BASOPHILS ABSOLUTE: 0 THOU/MM3 (ref 0–0.1)
BILIRUB SERPL-MCNC: 0.6 MG/DL (ref 0.3–1.2)
BUN BLDV-MCNC: 11 MG/DL (ref 5–27)
BUN BLDV-MCNC: 11 MG/DL (ref 7–22)
CALCIUM SERPL-MCNC: 9.1 MG/DL (ref 8.5–10.5)
CALCIUM SERPL-MCNC: 9.2 MG/DL (ref 8.5–10.5)
CHLORIDE BLD-SCNC: 84 MEQ/L (ref 98–111)
CHLORIDE BLD-SCNC: 85 MMOL/L (ref 98–109)
CO2: 22 MEQ/L (ref 23–33)
CO2: 23 MMOL/L (ref 22–32)
CREAT SERPL-MCNC: 0.9 MG/DL (ref 0.4–1.2)
CREAT SERPL-MCNC: 1 MG/DL (ref 0.6–1.3)
EGFR AFRICAN AMERICAN: >60 ML/MIN/1.73SQ.M
EGFR IF NONAFRICAN AMERICAN: >60 ML/MIN/1.73SQ.M
EKG ATRIAL RATE: 65 BPM
EKG P AXIS: 57 DEGREES
EKG P-R INTERVAL: 292 MS
EKG Q-T INTERVAL: 378 MS
EKG QRS DURATION: 108 MS
EKG QTC CALCULATION (BAZETT): 393 MS
EKG R AXIS: -80 DEGREES
EKG T AXIS: 46 DEGREES
EKG VENTRICULAR RATE: 65 BPM
EOSINOPHIL # BLD: 3.5 %
EOSINOPHILS ABSOLUTE: 0.2 THOU/MM3 (ref 0–0.4)
ERYTHROCYTE [DISTWIDTH] IN BLOOD BY AUTOMATED COUNT: 11.6 % (ref 11.5–14.5)
ERYTHROCYTE [DISTWIDTH] IN BLOOD BY AUTOMATED COUNT: 40.9 FL (ref 35–45)
GFR SERPL CREATININE-BSD FRML MDRD: 82 ML/MIN/1.73M2
GLUCOSE BLD-MCNC: 103 MG/DL (ref 70–108)
GLUCOSE: 117 MG/DL (ref 65–99)
HCT VFR BLD CALC: 42.7 % (ref 42–52)
HEMOGLOBIN: 15.6 GM/DL (ref 14–18)
IMMATURE GRANS (ABS): 0.01 THOU/MM3 (ref 0–0.07)
IMMATURE GRANULOCYTES: 0.2 %
LYMPHOCYTES # BLD: 22.2 %
LYMPHOCYTES ABSOLUTE: 1.2 THOU/MM3 (ref 1–4.8)
MAGNESIUM: 1.8 MG/DL (ref 1.6–2.4)
MCH RBC QN AUTO: 35.1 PG (ref 26–33)
MCHC RBC AUTO-ENTMCNC: 36.5 GM/DL (ref 32.2–35.5)
MCV RBC AUTO: 96 FL (ref 80–94)
MONOCYTES # BLD: 11.9 %
MONOCYTES ABSOLUTE: 0.6 THOU/MM3 (ref 0.4–1.3)
NUCLEATED RED BLOOD CELLS: 0 /100 WBC
OSMOLALITY CALCULATION: 236.3 MOSMOL/KG (ref 275–300)
OSMOLALITY URINE: 173 MOSMOL/KG (ref 250–750)
OSMOLALITY: 248 MOSMOL/KG (ref 275–295)
PLATELET # BLD: 149 THOU/MM3 (ref 130–400)
PMV BLD AUTO: 9.6 FL (ref 9.4–12.4)
POTASSIUM REFLEX MAGNESIUM: 5.5 MEQ/L (ref 3.5–5.2)
POTASSIUM SERPL-SCNC: 5.2 MEQ/L (ref 3.5–5.2)
POTASSIUM SERPL-SCNC: 5.2 MMOL/L (ref 3.5–5)
POTASSIUM SERPL-SCNC: 5.9 MEQ/L (ref 3.5–5.2)
PRO-BNP: 235.5 PG/ML (ref 0–900)
RBC # BLD: 4.45 MILL/MM3 (ref 4.7–6.1)
SEG NEUTROPHILS: 61.6 %
SEGMENTED NEUTROPHILS ABSOLUTE COUNT: 3.3 THOU/MM3 (ref 1.8–7.7)
SODIUM BLD-SCNC: 117 MEQ/L (ref 135–145)
SODIUM BLD-SCNC: 118 MMOL/L (ref 134–146)
SODIUM BLD-SCNC: 119 MEQ/L (ref 135–145)
SODIUM BLD-SCNC: 122 MEQ/L (ref 135–145)
SODIUM URINE: 30 MEQ/L
TOTAL PROTEIN: 6.6 G/DL (ref 6.1–8)
TROPONIN T: < 0.01 NG/ML
TSH SERPL DL<=0.05 MIU/L-ACNC: 1.43 UIU/ML (ref 0.4–4.2)
WBC # BLD: 5.4 THOU/MM3 (ref 4.8–10.8)

## 2021-08-24 PROCEDURE — 80053 COMPREHEN METABOLIC PANEL: CPT

## 2021-08-24 PROCEDURE — 87500 VANOMYCIN DNA AMP PROBE: CPT

## 2021-08-24 PROCEDURE — 87641 MR-STAPH DNA AMP PROBE: CPT

## 2021-08-24 PROCEDURE — 2580000003 HC RX 258: Performed by: REGISTERED NURSE

## 2021-08-24 PROCEDURE — 2580000003 HC RX 258: Performed by: EMERGENCY MEDICINE

## 2021-08-24 PROCEDURE — 83935 ASSAY OF URINE OSMOLALITY: CPT

## 2021-08-24 PROCEDURE — 93005 ELECTROCARDIOGRAM TRACING: CPT | Performed by: EMERGENCY MEDICINE

## 2021-08-24 PROCEDURE — 83880 ASSAY OF NATRIURETIC PEPTIDE: CPT

## 2021-08-24 PROCEDURE — 6370000000 HC RX 637 (ALT 250 FOR IP): Performed by: INTERNAL MEDICINE

## 2021-08-24 PROCEDURE — 84484 ASSAY OF TROPONIN QUANT: CPT

## 2021-08-24 PROCEDURE — 84295 ASSAY OF SERUM SODIUM: CPT

## 2021-08-24 PROCEDURE — 84300 ASSAY OF URINE SODIUM: CPT

## 2021-08-24 PROCEDURE — 84443 ASSAY THYROID STIM HORMONE: CPT

## 2021-08-24 PROCEDURE — 84132 ASSAY OF SERUM POTASSIUM: CPT

## 2021-08-24 PROCEDURE — 93010 ELECTROCARDIOGRAM REPORT: CPT | Performed by: INTERNAL MEDICINE

## 2021-08-24 PROCEDURE — 83735 ASSAY OF MAGNESIUM: CPT

## 2021-08-24 PROCEDURE — 85025 COMPLETE CBC W/AUTO DIFF WBC: CPT

## 2021-08-24 PROCEDURE — 6360000002 HC RX W HCPCS: Performed by: REGISTERED NURSE

## 2021-08-24 PROCEDURE — 6370000000 HC RX 637 (ALT 250 FOR IP): Performed by: REGISTERED NURSE

## 2021-08-24 PROCEDURE — 71046 X-RAY EXAM CHEST 2 VIEWS: CPT

## 2021-08-24 PROCEDURE — 2060000000 HC ICU INTERMEDIATE R&B

## 2021-08-24 PROCEDURE — 36415 COLL VENOUS BLD VENIPUNCTURE: CPT

## 2021-08-24 PROCEDURE — 87081 CULTURE SCREEN ONLY: CPT

## 2021-08-24 PROCEDURE — 99222 1ST HOSP IP/OBS MODERATE 55: CPT | Performed by: INTERNAL MEDICINE

## 2021-08-24 PROCEDURE — 83930 ASSAY OF BLOOD OSMOLALITY: CPT

## 2021-08-24 PROCEDURE — 99284 EMERGENCY DEPT VISIT MOD MDM: CPT

## 2021-08-24 RX ORDER — ONDANSETRON 4 MG/1
4 TABLET, ORALLY DISINTEGRATING ORAL EVERY 8 HOURS PRN
Status: DISCONTINUED | OUTPATIENT
Start: 2021-08-24 | End: 2021-08-27 | Stop reason: HOSPADM

## 2021-08-24 RX ORDER — LORAZEPAM 2 MG/ML
4 INJECTION INTRAMUSCULAR
Status: DISCONTINUED | OUTPATIENT
Start: 2021-08-24 | End: 2021-08-27 | Stop reason: HOSPADM

## 2021-08-24 RX ORDER — ATORVASTATIN CALCIUM 80 MG/1
80 TABLET, FILM COATED ORAL NIGHTLY
Status: DISCONTINUED | OUTPATIENT
Start: 2021-08-24 | End: 2021-08-27 | Stop reason: HOSPADM

## 2021-08-24 RX ORDER — SODIUM CHLORIDE 0.9 % (FLUSH) 0.9 %
5-40 SYRINGE (ML) INJECTION PRN
Status: DISCONTINUED | OUTPATIENT
Start: 2021-08-24 | End: 2021-08-24 | Stop reason: SDUPTHER

## 2021-08-24 RX ORDER — SODIUM CHLORIDE 0.9 % (FLUSH) 0.9 %
5-40 SYRINGE (ML) INJECTION PRN
Status: DISCONTINUED | OUTPATIENT
Start: 2021-08-24 | End: 2021-08-27 | Stop reason: HOSPADM

## 2021-08-24 RX ORDER — SODIUM CHLORIDE 9 MG/ML
INJECTION, SOLUTION INTRAVENOUS CONTINUOUS
Status: DISCONTINUED | OUTPATIENT
Start: 2021-08-24 | End: 2021-08-25

## 2021-08-24 RX ORDER — ONDANSETRON 2 MG/ML
4 INJECTION INTRAMUSCULAR; INTRAVENOUS EVERY 6 HOURS PRN
Status: DISCONTINUED | OUTPATIENT
Start: 2021-08-24 | End: 2021-08-27 | Stop reason: HOSPADM

## 2021-08-24 RX ORDER — LORAZEPAM 1 MG/1
1 TABLET ORAL
Status: DISCONTINUED | OUTPATIENT
Start: 2021-08-24 | End: 2021-08-27 | Stop reason: HOSPADM

## 2021-08-24 RX ORDER — ACETAMINOPHEN 650 MG/1
650 SUPPOSITORY RECTAL EVERY 6 HOURS PRN
Status: DISCONTINUED | OUTPATIENT
Start: 2021-08-24 | End: 2021-08-27 | Stop reason: HOSPADM

## 2021-08-24 RX ORDER — SODIUM CHLORIDE 9 MG/ML
25 INJECTION, SOLUTION INTRAVENOUS PRN
Status: DISCONTINUED | OUTPATIENT
Start: 2021-08-24 | End: 2021-08-27 | Stop reason: HOSPADM

## 2021-08-24 RX ORDER — LORAZEPAM 1 MG/1
3 TABLET ORAL
Status: DISCONTINUED | OUTPATIENT
Start: 2021-08-24 | End: 2021-08-27 | Stop reason: HOSPADM

## 2021-08-24 RX ORDER — ESCITALOPRAM OXALATE 10 MG/1
5 TABLET ORAL DAILY
Status: DISCONTINUED | OUTPATIENT
Start: 2021-08-25 | End: 2021-08-27 | Stop reason: HOSPADM

## 2021-08-24 RX ORDER — SPIRONOLACTONE 25 MG/1
25 TABLET ORAL DAILY
Status: ON HOLD | COMMUNITY
End: 2021-08-27 | Stop reason: HOSPADM

## 2021-08-24 RX ORDER — ASPIRIN 81 MG/1
81 TABLET, CHEWABLE ORAL DAILY
Status: DISCONTINUED | OUTPATIENT
Start: 2021-08-24 | End: 2021-08-27 | Stop reason: HOSPADM

## 2021-08-24 RX ORDER — LORAZEPAM 2 MG/ML
2 INJECTION INTRAMUSCULAR
Status: DISCONTINUED | OUTPATIENT
Start: 2021-08-24 | End: 2021-08-27 | Stop reason: HOSPADM

## 2021-08-24 RX ORDER — NICOTINE 21 MG/24HR
1 PATCH, TRANSDERMAL 24 HOURS TRANSDERMAL EVERY 24 HOURS
Status: DISCONTINUED | OUTPATIENT
Start: 2021-08-24 | End: 2021-08-27 | Stop reason: HOSPADM

## 2021-08-24 RX ORDER — SODIUM CHLORIDE 0.9 % (FLUSH) 0.9 %
5-40 SYRINGE (ML) INJECTION EVERY 12 HOURS SCHEDULED
Status: DISCONTINUED | OUTPATIENT
Start: 2021-08-24 | End: 2021-08-24 | Stop reason: SDUPTHER

## 2021-08-24 RX ORDER — SODIUM CHLORIDE 9 MG/ML
25 INJECTION, SOLUTION INTRAVENOUS PRN
Status: DISCONTINUED | OUTPATIENT
Start: 2021-08-24 | End: 2021-08-24 | Stop reason: SDUPTHER

## 2021-08-24 RX ORDER — AMLODIPINE BESYLATE 5 MG/1
5 TABLET ORAL DAILY
Status: DISCONTINUED | OUTPATIENT
Start: 2021-08-25 | End: 2021-08-27 | Stop reason: HOSPADM

## 2021-08-24 RX ORDER — SODIUM CHLORIDE 1000 MG
1 TABLET, SOLUBLE MISCELLANEOUS 2 TIMES DAILY WITH MEALS
Status: DISCONTINUED | OUTPATIENT
Start: 2021-08-24 | End: 2021-08-27 | Stop reason: HOSPADM

## 2021-08-24 RX ORDER — TAMSULOSIN HYDROCHLORIDE 0.4 MG/1
0.4 CAPSULE ORAL DAILY
Status: DISCONTINUED | OUTPATIENT
Start: 2021-08-24 | End: 2021-08-27 | Stop reason: HOSPADM

## 2021-08-24 RX ORDER — LORAZEPAM 2 MG/ML
3 INJECTION INTRAMUSCULAR
Status: DISCONTINUED | OUTPATIENT
Start: 2021-08-24 | End: 2021-08-27 | Stop reason: HOSPADM

## 2021-08-24 RX ORDER — ESCITALOPRAM OXALATE 10 MG/1
10 TABLET ORAL DAILY
Status: DISCONTINUED | OUTPATIENT
Start: 2021-08-24 | End: 2021-08-24

## 2021-08-24 RX ORDER — LORAZEPAM 1 MG/1
4 TABLET ORAL
Status: DISCONTINUED | OUTPATIENT
Start: 2021-08-24 | End: 2021-08-27 | Stop reason: HOSPADM

## 2021-08-24 RX ORDER — PRASUGREL 10 MG/1
10 TABLET, FILM COATED ORAL DAILY
Status: DISCONTINUED | OUTPATIENT
Start: 2021-08-24 | End: 2021-08-27 | Stop reason: HOSPADM

## 2021-08-24 RX ORDER — LISINOPRIL 10 MG/1
10 TABLET ORAL 2 TIMES DAILY
Status: DISCONTINUED | OUTPATIENT
Start: 2021-08-24 | End: 2021-08-27 | Stop reason: HOSPADM

## 2021-08-24 RX ORDER — SODIUM CHLORIDE 9 MG/ML
1000 INJECTION, SOLUTION INTRAVENOUS CONTINUOUS
Status: DISCONTINUED | OUTPATIENT
Start: 2021-08-24 | End: 2021-08-24

## 2021-08-24 RX ORDER — LANOLIN ALCOHOL/MO/W.PET/CERES
50 CREAM (GRAM) TOPICAL DAILY
Status: DISCONTINUED | OUTPATIENT
Start: 2021-08-24 | End: 2021-08-25 | Stop reason: SDUPTHER

## 2021-08-24 RX ORDER — ACETAMINOPHEN 325 MG/1
650 TABLET ORAL EVERY 6 HOURS PRN
Status: DISCONTINUED | OUTPATIENT
Start: 2021-08-24 | End: 2021-08-27 | Stop reason: HOSPADM

## 2021-08-24 RX ORDER — LORAZEPAM 1 MG/1
2 TABLET ORAL
Status: DISCONTINUED | OUTPATIENT
Start: 2021-08-24 | End: 2021-08-27 | Stop reason: HOSPADM

## 2021-08-24 RX ORDER — LORAZEPAM 2 MG/ML
1 INJECTION INTRAMUSCULAR
Status: DISCONTINUED | OUTPATIENT
Start: 2021-08-24 | End: 2021-08-27 | Stop reason: HOSPADM

## 2021-08-24 RX ORDER — METOPROLOL TARTRATE 50 MG/1
25 TABLET, FILM COATED ORAL 2 TIMES DAILY
Status: DISCONTINUED | OUTPATIENT
Start: 2021-08-24 | End: 2021-08-27 | Stop reason: HOSPADM

## 2021-08-24 RX ORDER — LEVOTHYROXINE SODIUM 0.03 MG/1
25 TABLET ORAL DAILY
Status: DISCONTINUED | OUTPATIENT
Start: 2021-08-25 | End: 2021-08-27 | Stop reason: HOSPADM

## 2021-08-24 RX ORDER — SODIUM CHLORIDE 0.9 % (FLUSH) 0.9 %
5-40 SYRINGE (ML) INJECTION EVERY 12 HOURS SCHEDULED
Status: DISCONTINUED | OUTPATIENT
Start: 2021-08-24 | End: 2021-08-27 | Stop reason: HOSPADM

## 2021-08-24 RX ADMIN — ATORVASTATIN CALCIUM 80 MG: 80 TABLET, FILM COATED ORAL at 22:09

## 2021-08-24 RX ADMIN — PRASUGREL 10 MG: 10 TABLET, FILM COATED ORAL at 22:09

## 2021-08-24 RX ADMIN — Medication 1 G: at 22:09

## 2021-08-24 RX ADMIN — ASPIRIN 81 MG: 81 TABLET, CHEWABLE ORAL at 22:10

## 2021-08-24 RX ADMIN — METOPROLOL TARTRATE 25 MG: 50 TABLET, FILM COATED ORAL at 22:10

## 2021-08-24 RX ADMIN — SODIUM CHLORIDE 1000 ML: 9 INJECTION, SOLUTION INTRAVENOUS at 11:58

## 2021-08-24 RX ADMIN — ENOXAPARIN SODIUM 40 MG: 40 INJECTION SUBCUTANEOUS at 18:25

## 2021-08-24 RX ADMIN — SODIUM CHLORIDE: 9 INJECTION, SOLUTION INTRAVENOUS at 14:18

## 2021-08-24 RX ADMIN — TAMSULOSIN HYDROCHLORIDE 0.4 MG: 0.4 CAPSULE ORAL at 22:10

## 2021-08-24 RX ADMIN — SODIUM ZIRCONIUM CYCLOSILICATE 10 G: 5 POWDER, FOR SUSPENSION ORAL at 16:48

## 2021-08-24 RX ADMIN — Medication 50 MG: at 15:38

## 2021-08-24 ASSESSMENT — ENCOUNTER SYMPTOMS
COUGH: 0
WHEEZING: 0
ABDOMINAL PAIN: 0
SHORTNESS OF BREATH: 0
VOMITING: 0
BLOOD IN STOOL: 0

## 2021-08-24 NOTE — CONSULTS
Nephrology Consult Note  Patient's Name: Jesusita Maloney  6:30 PM  8/24/2021    Nephrologist: Sindi Baker    Reason for Consult: Hyponatremia  Requesting Physician:  Tacho Graham DO  PCP: Anabel Weber MD    Chief Complaint: None  Assessment  1. Hyponatremia likely due to liberal beer intake which may be compounded by medication such as escitalopram.  He also has a long history of cigarette smoking and possibly has lung disease which may be another compounding factor. .  Inadequate thyroid supplementation can cause hyponatremia as well. However his TSH is normal  2. Alcohol misuse  3. Hypertension primary  4. Hypothyroidism by history  5. COPD  6. Hyperkalemia improving    Plan    1. I discussed my thoughts at length with patient and followed by in the room. 2. They understood. 3. I addressed their questions. 4. Labs reviewed. 5. Chest x-ray report reviewed. 6. CT scan of the thorax report from 2017 April reviewed  7. We will check  urine osmolality. 8. Urine sodium despite  0.9 saline infusion. 9. Continue intravenous fluid. 10. Add sodium chloride tablet 1000 mg twice a day. 11. Decrease escitalopram from 10 mg a day to 5 mg a day. 12. Will follow. History Obtained From: Patient, family, primary service and electronic medical record  History of Present Ilness:    Jesusita Maloney is a 78 y.o. male with history of hypertension, coronary artery disease among other multiple medical entities listed below admitted through the emergency department after the patient presented there with hyponatremia. Patient had a laboratory evaluation done in the outpatient that revealed a low serum sodium of around 118 mEq/L. As result he was asked to proceed to the emergency department. Repeat serum in the emergency department is 117 mEq/L. As result we were asked to see him. He admits to drinking about 6 to 8 cans of beer a day for many years.   He also smokes more than a pack of cigarettes a day for last 25 years or so according to him. He has shortness of breath with moderate exertion relieved by rest.  His chest x-ray in emergency department revealed COPD. He has not been followed by a pulmonologist and has not been  officially diagnosed as such. Review of his record reveals a serum sodium of 132 mEq/L back in April 2019 and also 131 mEq/L in February 2020. Patient had no knowledge of that according to him. His initial potassium in the emergency room was also high at 5.5 mEq/L and  5.9 mEq/L respectively. He received a 10 g of sodium zirconium cyclosilicate. Repeat potassium is normal now at 5.2 mEq/L. Jeramie Harmon He had CT scan of the thorax done in April 2017 and it was unremarkable. Past Medical History:   Diagnosis Date    [facility] rest [facility]) 1/17/2018    Hyperlipidemia     Hypertension     Precordial pain 1/17/2018    S/P cardiac cath: 1/25/2018: 70-75% mid-LAD. LCX OK. RCA 20%. LVEF 454-50%. LVEDP 12 mmHg. 1/25/2018 1/25/2018: 70-75% mid-LAD. LCX OK. RCA 20%. LVEF 454-50%. LVEDP 12 mmHg. Dr. Afua Vines S/P PTCA: 1/25/2018: FFR-guided stenting of mid-LAD Xience 2.75x28 mm, post-dilated to 3.78 mm proximally. 1/25/2018 1/25/2018: FFR-guided stenting of mid-LAD Xience 2.75x28 mm, post-dilated to 3.78 mm proximally. Dr. Afua Vines Thyroid disease        Past Surgical History:   Procedure Laterality Date    CATARACT REMOVAL Bilateral 2017    CATARACT REMOVAL Bilateral     JOINT REPLACEMENT Left 2010    KNEE SURGERY Bilateral     scopes    PTCA         Family History   Problem Relation Age of Onset    Cancer Mother         breast    Heart Disease Father         reports that he has been smoking cigarettes. He has a 25.00 pack-year smoking history. He quit smokeless tobacco use about 7 years ago. He reports current alcohol use. He reports that he does not use drugs. Allergies:  Patient has no known allergies.     Current Medications:    amLODIPine (NORVASC) tablet 5 mg, Daily  aspirin chewable tablet 81 mg, Daily  atorvastatin (LIPITOR) tablet 80 mg, Nightly  escitalopram (LEXAPRO) tablet 10 mg, Daily  levothyroxine (SYNTHROID) tablet 25 mcg, Daily  [Held by provider] lisinopril (PRINIVIL;ZESTRIL) tablet 10 mg, BID  metoprolol tartrate (LOPRESSOR) tablet 25 mg, BID  prasugrel (EFFIENT) tablet 10 mg, Daily  tamsulosin (FLOMAX) capsule 0.4 mg, Daily  sodium chloride flush 0.9 % injection 5-40 mL, 2 times per day  sodium chloride flush 0.9 % injection 5-40 mL, PRN  0.9 % sodium chloride infusion, PRN  enoxaparin (LOVENOX) injection 40 mg, Daily  ondansetron (ZOFRAN-ODT) disintegrating tablet 4 mg, Q8H PRN   Or  ondansetron (ZOFRAN) injection 4 mg, Q6H PRN  acetaminophen (TYLENOL) tablet 650 mg, Q6H PRN   Or  acetaminophen (TYLENOL) suppository 650 mg, Q6H PRN  0.9 % sodium chloride infusion, Continuous  nicotine (NICODERM CQ) 21 MG/24HR 1 patch, Q24H  LORazepam (ATIVAN) tablet 1 mg, Q1H PRN   Or  LORazepam (ATIVAN) injection 1 mg, Q1H PRN   Or  LORazepam (ATIVAN) tablet 2 mg, Q1H PRN   Or  LORazepam (ATIVAN) injection 2 mg, Q1H PRN   Or  LORazepam (ATIVAN) tablet 3 mg, Q1H PRN   Or  LORazepam (ATIVAN) injection 3 mg, Q1H PRN   Or  LORazepam (ATIVAN) tablet 4 mg, Q1H PRN   Or  LORazepam (ATIVAN) injection 4 mg, Q1H PRN  thiamine tablet 50 mg, Daily        Review of Systems:   Review of Systems   Pertinent positives stated above in HPI. All other systems were reviewed and were negative. ROS: As in HPI    Physical exam:   Physical Exam   Constitutional: Well-developed elderly gentleman awake and alert in no distress. Vitals:   Vitals:    08/24/21 1829   BP: (!) 104/58   Pulse: 63   Resp: 15   Temp:    SpO2: 96%       Skin: no rash, turgor is normal  Heent: Pupils are reactive to light. Throat is clear.   Oral mucosa is moist.  Neck: no bruits or jvd noted   Cardiovascular:  S1, S2 without murmur   Respiratory: Clear with no wheezes,rhonchi or rales   Abdomen: soft,non tender,good bowel sound and no palpable mass  Ext: lower extremity edema  Psychiatric: mood and affect appropriate  Musculoskeletal:  Rom, muscular strength intact   CNS: Very awake and very alert. Well-oriented. Normal speech. Good motor strength. No obvious focal deficit. Data:   Labs:  Lab Results   Component Value Date     (LL) 08/24/2021     (LL) 08/24/2021     (LL) 08/23/2021    K 5.2 08/24/2021    K 5.9 (H) 08/24/2021    K 5.5 (H) 08/24/2021    CL 84 (L) 08/24/2021    CO2 22 (L) 08/24/2021    CO2 23 08/23/2021    CO2 24 08/13/2021    CREATININE 0.9 08/24/2021    CREATININE 1.00 08/23/2021    CREATININE 0.88 08/13/2021    BUN 11 08/24/2021    BUN 11 08/23/2021    BUN 11 08/13/2021    GLUCOSE 103 08/24/2021    GLUCOSE 117 (H) 08/23/2021    GLUCOSE 97 08/13/2021    WBC 5.4 08/24/2021    WBC <1 06/08/2021    WBC 7.2 06/08/2021    HGB 15.6 08/24/2021    HGB 16.6 06/08/2021    HGB 16.7 02/21/2020    HCT 42.7 08/24/2021    HCT 47.8 06/08/2021    HCT 47.4 02/21/2020    MCV 96.0 (H) 08/24/2021     08/24/2021     {Labs reviewed    Imaging:  CXR results: Thank you Dr. Fatimah Arambula MD for allowing us to participate in care of Cate Medina   **This report has been created using voice recognition software. It maycontain minor  errors which are inherent in voice recognition technology. **    Electronically signed by Zain Nicholas MD on 8/24/2021 at 6:30 PM

## 2021-08-24 NOTE — ED PROVIDER NOTES
[address] [facility] [address] [address] EMERGENCY DEPT  eMERGENCY dEPARTMENT eNCOUnter      Pt Name: Isabella Pack  MRN: [id number]  [place] 1945  Date of evaluation: 8/24/21      CHIEF COMPLAINT       Chief Complaint   Patient presents with    Other     abnormal labs    Fatigue       HISTORY OF PRESENT ILLNESS     Isabella Pack is a 78 y.o. male who presents to the emergency department as referred here by his heart failure clinic. Patient indicates that he had outpatient labs drawn and was told that his sodium and potassium numbers were off. Patient has had recent fatigue. No unilateral or focal sensory/motor deficits. No drooping face. No headaches. No other strokelike symptoms. Patient states that he does sometimes feel off balance if he stands up but denies any specific vertigo or near syncope. No chest pain or palpitations. No shortness of breath. No acute cough or wheezing. No fever or shaking chills. He is vaccinated against COVID-19. No abdominal pain. States that sometimes in the morning he will feel slightly nauseous. He is not nauseous currently. He has not vomited. He also notes that sometimes in the morning he will have loose stool. No watery diarrhea. No blood in the stool. No dysuria or hematuria. He has chronic lower extremity edema which is at baseline. Review of systems otherwise negative. He has no additional complaints at this time. REVIEW OF SYSTEMS       Review of Systems   Constitutional: Positive for fatigue. Negative for chills and fever. Respiratory: Negative for cough, shortness of breath and wheezing. Cardiovascular: Positive for leg swelling (Chronic). Negative for chest pain and palpitations. Gastrointestinal: Negative for abdominal pain, blood in stool and vomiting. Genitourinary: Negative for dysuria and hematuria. Skin: Negative for rash and wound.    Neurological: Negative for dizziness, syncope, facial asymmetry, speech difficulty, weakness, light-headedness, He has a 25.00 pack-year smoking history. He quit smokeless tobacco use about 7 years ago. He reports current alcohol use. He reports that he does not use drugs. PHYSICAL EXAM       INITIAL VITALS:  oral temperature is 97.6 °F (36.4 °C). His blood pressure is 121/76 and his pulse is 64. His respiration is 15 and oxygen saturation is 99%. Physical Exam  Vitals and nursing note reviewed. Constitutional:       General: He is not in acute distress. Appearance: Normal appearance. Cardiovascular:      Rate and Rhythm: Normal rate and regular rhythm. Heart sounds: Normal heart sounds. No murmur heard. Pulmonary:      Effort: Pulmonary effort is normal.      Breath sounds: Normal breath sounds. Abdominal:      Palpations: Abdomen is soft. Tenderness: There is no abdominal tenderness. There is no guarding or rebound. Musculoskeletal:      Right lower leg: Edema (Trace) present. Left lower leg: Edema (Trace) present. Skin:     General: Skin is warm and dry. Capillary Refill: Capillary refill takes less than 2 seconds. Findings: No rash. Neurological:      General: No focal deficit present. Mental Status: He is alert and oriented to person, place, and time. Cranial Nerves: No cranial nerve deficit. Sensory: No sensory deficit. Motor: No weakness. Coordination: Coordination normal.   Psychiatric:         Mood and Affect: Mood normal.         Behavior: Behavior normal.         DIAGNOSTIC RESULTS     EKG: All EKG's are interpreted by the Emergency Department Physician who either signs or Co-signs this chart in the absence of a cardiologist.    EKG at 10:32 AM shows a sinus rhythm with a first-degree AV block. Ventricular rate of 65 bpm.  MI interval of 292 ms. Narrow QRS. Left axis deviation. Left anterior fascicular block. Poor R wave progression. No consistent T wave inversion. No ST elevation or acute infarction.   No arrhythmia. RADIOLOGY:   I directly visualized the following images and reviewed the radiologist interpretations:    XR CHEST (2 VW)   Final Result   No interval change since previous study dated 12 February 2016, no acute cardiopulmonary disease         **This report has been created using voice recognition software. It may contain minor errors which are inherent in voice recognition technology. **      Final report electronically signed by DR Carlo Cuba on 8/24/2021 10:36 AM          LABS:  Labs Reviewed   CBC WITH AUTO DIFFERENTIAL - Abnormal; Notable for the following components:       Result Value    RBC 4.45 (*)     MCV 96.0 (*)     MCH 35.1 (*)     MCHC 36.5 (*)     All other components within normal limits   COMPREHENSIVE METABOLIC PANEL W/ REFLEX TO MG FOR LOW K - Abnormal; Notable for the following components:    Sodium 117 (*)     Potassium reflex Magnesium 5.5 (*)     Chloride 84 (*)     CO2 22 (*)     All other components within normal limits   GLOMERULAR FILTRATION RATE, ESTIMATED - Abnormal; Notable for the following components:    Est, Glom Filt Rate 82 (*)     All other components within normal limits   OSMOLALITY - Abnormal; Notable for the following components:    Osmolality Calc 236.3 (*)     All other components within normal limits   MAGNESIUM   TROPONIN   BRAIN NATRIURETIC PEPTIDE   TSH WITH REFLEX   ANION GAP     Labs reviewed  Slightly hemolyzed sample    EMERGENCY DEPARTMENT COURSE:     Vitals:    Vitals:    08/24/21 0941 08/24/21 1031 08/24/21 1117 08/24/21 1159   BP: 125/84  121/75 121/76   Pulse: 67 63 62 64   Resp: 14 15 16 15   Temp: 97.6 °F (36.4 °C)      TempSrc: Oral      SpO2:  97% 98% 99%     -------------------------  BP: 121/76, Temp: 97.6 °F (36.4 °C), Pulse: 64, Resp: 15      CONSULTS:  Internal Medicine - admission      MEDICAL DECISION MAKING:     Labs and imaging reviewed  Patient is significantly hyponatremic  IV fluids started 75 mL/h  He is mildly hyperkalemic although the specimen is somewhat hemolyzed  We will hold off on hyperkalemic medications at this time    Case discussed with Tay Knight who has accepted admission and will place further orders      FINAL IMPRESSION      1. Fatigue, unspecified type    2. Hyponatremia          DISPOSITION/PLAN     Admit    (Please note that portions of this note were completed with a voice recognition program.  Efforts were made to edit the dictations but occasionally words are mis-transcribed.)    Lisa Roblero DO  Attending Emergency Physician                   Mike Freedman DO  08/24/21 9770

## 2021-08-24 NOTE — TELEPHONE ENCOUNTER
Christiano from [employer] called with Lesa [address]. Per previous documentation [facility] has recommended patient go to the ER and staff has talked to patient and he will go to ER.

## 2021-08-24 NOTE — H&P
Internal Medicine Specialties  H&P  8/24/2021  12:31 PM    Patient:  Aileen Blankenship  YOB: 1945    MRN: [id number]   Acct:  [de-identified]   15/015A  Primary Care Physician: Emily Velasquez MD    Chief Complaint:  Fatigue   Low sodium in outpt labs       History of Present Illness: The patient is a 78 y.o. male with pmhx of CHF preserved EF, LAD s/p PCI 0821, BPH, umbilical hernia, htn, hld, alcohol abuse, tobacco abuse who presents with abnormal labs outpt. Pt was noted to have Na 118 yesterday outpt and was advised to come into the ED. Pt was recently started on Spirnolactone at the beginning of this month, which was his only medication change. Pt denies excessive water intake. He does drink 7-8 beers per day and smokes 2ppd. Pt has no complaints other than chronic fatigue. He lives independently and is able to function independently with the use of occasional cane. Daughter at bedside today. Pt denies confusion, chest pain, SOB, fever, chills, falls. In the emergency department Na 117, K+ 5.5, CO2 22, , CXR negative. Pt has received both of his COVID vaccines. Patient admitted for hyponatremia. Pt is a full code. Past Medical History:        Diagnosis Date    Angina at rest [facility]) 1/17/2018    Hyperlipidemia     Hypertension     Precordial pain 1/17/2018    S/P cardiac cath: 1/25/2018: 70-75% mid-LAD. LCX OK. RCA 20%. LVEF 454-50%. LVEDP 12 mmHg. 1/25/2018 1/25/2018: 70-75% mid-LAD. LCX OK. RCA 20%. LVEF 454-50%. LVEDP 12 mmHg. Dr. Leigh Fuentes S/P PTCA: 1/25/2018: FFR-guided stenting of mid-LAD Xience 2.75x28 mm, post-dilated to 3.78 mm proximally. 1/25/2018 1/25/2018: FFR-guided stenting of mid-LAD Xience 2.75x28 mm, post-dilated to 3.78 mm proximally.  Dr. Leigh Fuentes Thyroid disease        Past Surgical History:        Procedure Laterality Date    CATARACT REMOVAL Bilateral 2017    CATARACT REMOVAL Bilateral     JOINT REPLACEMENT Left 2010    KNEE SURGERY Bilateral scopes    PTCA         Home Medications: Not in a hospital admission. Allergies:  Patient has no known allergies. Social History:    reports that he has been smoking cigarettes. He has a 25.00 pack-year smoking history. He quit smokeless tobacco use about 7 years ago. He reports current alcohol use. He reports that he does not use drugs.       Family History:       Problem Relation Age of Onset    Cancer Mother         breast    Heart Disease Father        Review of Systems:    General ROS: fatigue  Psychological ROS: negative  Hematological and Lymphatic ROS: negative  Endocrine ROS: negative  Vision:negative  ENT ROS: Denies  Respiratory ROS: no cough, shortness of breath, or wheezing  Cardiovascular ROS: no chest pain or dyspnea on exertion  Gastrointestinal ROS: no abdominal pain, change in bowel habits, or black or bloody stools  Genito-Urinary ROS: no dysuria, trouble voiding, or hematuria  Musculoskeletal ROS: negative  Neurological ROS: no TIA or stroke symptoms  Dermatological ROS: negative  Weight:no change in weight  Appetite:ok      Physical Exam:    Vitals:  Patient Vitals for the past 24 hrs:   BP Temp Temp src Pulse Resp SpO2   08/24/21 1159 121/76 -- -- 64 15 99 %   08/24/21 1117 121/75 -- -- 62 16 98 %   08/24/21 1031 -- -- -- 63 15 97 %   08/24/21 0941 125/84 97.6 °F (36.4 °C) Oral 67 14 --     Weight:       24 hour intake/output:No intake or output data in the 24 hours ending 08/24/21 1231    General appearance - alert, well appearing, and in no distress  Eyes - pupils equal and reactive, extraocular eye movements intact  Ears - bilateral TM's and external ear canals normal  Nose - normal and patent, no erythema, discharge or polyps  Mouth - mucous membranes moist, pharynx normal without lesions  Neck - supple, no significant adenopathy  Lymphatics - no palpable lymphadenopathy, no hepatosplenomegaly  Chest - clear to auscultation, no wheezes, rales or rhonchi, symmetric air entry  Distant Breath Sounds: No  Heart - normal rate, regular rhythm, normal S1, S2, no murmurs, rubs, clicks or gallops  Abdomen - soft, nontender, nondistended, no masses or organomegaly  Obese: Yes  Neurological - alert, oriented, normal speech, no focal findings or movement disorder noted  Musculoskeletal - no joint tenderness, deformity or swelling  Extremities - peripheral pulses normal, no pedal edema, no clubbing or cyanosis  Skin - normal coloration and turgor, no rashes, no suspicious skin lesions noted    Review of Labs and Diagnostic Testing:    CBC:   Recent Labs     08/24/21  0940   WBC 5.4   HGB 15.6   HCT 42.7   MCV 96.0*        BMP:   Recent Labs     08/24/21  0940   *   K 5.5*   CL 84*   CO2 22*   BUN 11   CREATININE 0.9   CALCIUM 9.2   GLUCOSE 103     PT/INR: No results for input(s): PROTIME, INR in the last 72 hours. APTT: No results for input(s): APTT in the last 72 hours. Lipids:   Recent Labs     08/24/21  0940   ALKPHOS 90   ALT 46   AST 37   BILITOT 0.6   LABALBU 4.4     Troponin:   Recent Labs     08/24/21  0940   TROPONINT < 0.010        Imaging:  XR CHEST (2 VW)    Result Date: 8/24/2021  PROCEDURE: XR CHEST (2 VW) CLINICAL INFORMATION: cough. COMPARISON: Chest x-ray dated 12 February 2016. TECHNIQUE: PA and lateral views the chest. FINDINGS: The heart size is normal.  There is atherosclerotic calcification in the aortic arch. There is mild diffuse COPD and thickening of the interstitial lung markings. There are no superimposed infiltrates or effusions. The pulmonary vascularity is normal. There is mild thoracic spondylosis. No interval change since previous study dated 12 February 2016, no acute cardiopulmonary disease **This report has been created using voice recognition software. It may contain minor errors which are inherent in voice recognition technology. ** Final report electronically signed by DR Liborio Boyle on 8/24/2021 10:36 AM    VL DUP LOWER EXTREMITY VENOUS BILATERAL    Result Date: 8/11/2021  PROCEDURE: VL DUP LOWER EXTREMITY VENOUS BILATERAL CLINICAL INFORMATION: Bilateral lower extremity edema COMPARISON: No prior study. TECHNIQUE: Multiple grayscale and color flow images of the major veins of both lower extremities were obtained from the level of the groin to the level of the ankle. Multiple compression and augmentation maneuvers were performed. FINDINGS: RIGHT LOWER EXTREMITY: There is partial flow and partial compressibility of the distal popliteal vein and peroneal veins which contain a small amount of chronic appearing mural thrombus. All of the other deep veins of the right lower extremity are widely  patent with normal flow and normal compressibility. No also that there is reflux in the peroneal and posterior tibial veins and popliteal vein, indicating valvular insufficiency. LEFT LOWER EXTREMITY:All the  deep veins of the left lower extremity are widely patent with normal flow and normal compressibility. 1. Normal venous ultrasound left leg. 2. Chronic findings in the right popliteal vein and calf veins, as described above. No acute or occlusive process is seen. **This report has been created using voice recognition software. It may contain minor errors which are inherent in voice recognition technology. ** Final report electronically signed by Dr. Joel Jaeger on 8/11/2021 2:52 PM      Assessment/Plan:    1. Hyponatremia   a. Consulted nephrology   b. Most likely due to diuretic use; pt not fluid overloaded on exam   c. Check serum osmol, urine osmol, urine sodium  d. Start NS 75/hr  e. Check Na Q4h; do not increase Na by more than 8-10 in 24 hours   f. Seizure precautions   2. Hyperkalemia  a. Most likely due to [address] compounded with Lisinopril  i. Holding both   b. Re-Check in 4 hours   3. Alcohol abuse  a. CIWA precautions   4. Tobacco abuse   a. Nicotine patch  b. Tobacco cessation education   5. Hypothyroidism  a.  Cont

## 2021-08-24 NOTE — ED NOTES
Pt medicated per MAR. Pt tolerated well. Respirations unlabored.       Arianna ARIZA RN  08/24/21 2582

## 2021-08-24 NOTE — ED NOTES
Pt medicated per MAR. Pt resting on cot with unlabored respirations.       Debora Ashford RN  08/24/21 8467

## 2021-08-24 NOTE — ED NOTES
Pt resting on cot with unlabored respirations. Daughter at bedside.       Efrain LYLES RN  08/24/21 [id number]

## 2021-08-25 LAB
ANION GAP SERPL CALCULATED.3IONS-SCNC: 14 MEQ/L (ref 8–16)
BASOPHILS # BLD: 0.5 %
BASOPHILS ABSOLUTE: 0 THOU/MM3 (ref 0–0.1)
BUN BLDV-MCNC: 10 MG/DL (ref 7–22)
CALCIUM SERPL-MCNC: 8.5 MG/DL (ref 8.5–10.5)
CHLORIDE BLD-SCNC: 91 MEQ/L (ref 98–111)
CO2: 19 MEQ/L (ref 23–33)
CREAT SERPL-MCNC: 0.7 MG/DL (ref 0.4–1.2)
EOSINOPHIL # BLD: 2.6 %
EOSINOPHILS ABSOLUTE: 0.2 THOU/MM3 (ref 0–0.4)
ERYTHROCYTE [DISTWIDTH] IN BLOOD BY AUTOMATED COUNT: 11.9 % (ref 11.5–14.5)
ERYTHROCYTE [DISTWIDTH] IN BLOOD BY AUTOMATED COUNT: 42.7 FL (ref 35–45)
GFR SERPL CREATININE-BSD FRML MDRD: > 90 ML/MIN/1.73M2
GLUCOSE BLD-MCNC: 78 MG/DL (ref 70–108)
HCT VFR BLD CALC: 41.5 % (ref 42–52)
HEMOGLOBIN: 14.7 GM/DL (ref 14–18)
IMMATURE GRANS (ABS): 0.02 THOU/MM3 (ref 0–0.07)
IMMATURE GRANULOCYTES: 0.3 %
LYMPHOCYTES # BLD: 23.8 %
LYMPHOCYTES ABSOLUTE: 1.5 THOU/MM3 (ref 1–4.8)
MCH RBC QN AUTO: 34.8 PG (ref 26–33)
MCHC RBC AUTO-ENTMCNC: 35.4 GM/DL (ref 32.2–35.5)
MCV RBC AUTO: 98.3 FL (ref 80–94)
MONOCYTES # BLD: 10 %
MONOCYTES ABSOLUTE: 0.6 THOU/MM3 (ref 0.4–1.3)
MRSA SCREEN RT-PCR: NEGATIVE
NUCLEATED RED BLOOD CELLS: 0 /100 WBC
OSMOLALITY CALCULATION: 247.5 MOSMOL/KG (ref 275–300)
PLATELET # BLD: 127 THOU/MM3 (ref 130–400)
PMV BLD AUTO: 9.7 FL (ref 9.4–12.4)
POTASSIUM REFLEX MAGNESIUM: 4.6 MEQ/L (ref 3.5–5.2)
RBC # BLD: 4.22 MILL/MM3 (ref 4.7–6.1)
SEG NEUTROPHILS: 62.8 %
SEGMENTED NEUTROPHILS ABSOLUTE COUNT: 3.8 THOU/MM3 (ref 1.8–7.7)
SODIUM BLD-SCNC: 121 MEQ/L (ref 135–145)
SODIUM BLD-SCNC: 124 MEQ/L (ref 135–145)
VANCOMYCIN RESISTANT ENTEROCOCCUS: NEGATIVE
WBC # BLD: 6.1 THOU/MM3 (ref 4.8–10.8)

## 2021-08-25 PROCEDURE — 2580000003 HC RX 258: Performed by: REGISTERED NURSE

## 2021-08-25 PROCEDURE — 99232 SBSQ HOSP IP/OBS MODERATE 35: CPT | Performed by: INTERNAL MEDICINE

## 2021-08-25 PROCEDURE — 6370000000 HC RX 637 (ALT 250 FOR IP): Performed by: REGISTERED NURSE

## 2021-08-25 PROCEDURE — 85025 COMPLETE CBC W/AUTO DIFF WBC: CPT

## 2021-08-25 PROCEDURE — 2580000003 HC RX 258: Performed by: INTERNAL MEDICINE

## 2021-08-25 PROCEDURE — 6370000000 HC RX 637 (ALT 250 FOR IP): Performed by: INTERNAL MEDICINE

## 2021-08-25 PROCEDURE — 84295 ASSAY OF SERUM SODIUM: CPT

## 2021-08-25 PROCEDURE — 80048 BASIC METABOLIC PNL TOTAL CA: CPT

## 2021-08-25 PROCEDURE — 6360000002 HC RX W HCPCS: Performed by: REGISTERED NURSE

## 2021-08-25 PROCEDURE — 36415 COLL VENOUS BLD VENIPUNCTURE: CPT

## 2021-08-25 PROCEDURE — 2060000000 HC ICU INTERMEDIATE R&B

## 2021-08-25 RX ORDER — FOLIC ACID 1 MG/1
1 TABLET ORAL DAILY
Status: DISCONTINUED | OUTPATIENT
Start: 2021-08-25 | End: 2021-08-27 | Stop reason: HOSPADM

## 2021-08-25 RX ORDER — LANOLIN ALCOHOL/MO/W.PET/CERES
100 CREAM (GRAM) TOPICAL DAILY
Status: DISCONTINUED | OUTPATIENT
Start: 2021-08-25 | End: 2021-08-27 | Stop reason: HOSPADM

## 2021-08-25 RX ADMIN — Medication 1 G: at 09:12

## 2021-08-25 RX ADMIN — Medication 1 G: at 17:34

## 2021-08-25 RX ADMIN — PRASUGREL 10 MG: 10 TABLET, FILM COATED ORAL at 09:12

## 2021-08-25 RX ADMIN — LEVOTHYROXINE SODIUM 25 MCG: 0.03 TABLET ORAL at 04:57

## 2021-08-25 RX ADMIN — METOPROLOL TARTRATE 25 MG: 50 TABLET, FILM COATED ORAL at 09:11

## 2021-08-25 RX ADMIN — ASPIRIN 81 MG: 81 TABLET, CHEWABLE ORAL at 09:14

## 2021-08-25 RX ADMIN — ESCITALOPRAM 5 MG: 10 TABLET, FILM COATED ORAL at 09:12

## 2021-08-25 RX ADMIN — SODIUM CHLORIDE: 9 INJECTION, SOLUTION INTRAVENOUS at 00:25

## 2021-08-25 RX ADMIN — SODIUM CHLORIDE, PRESERVATIVE FREE 10 ML: 5 INJECTION INTRAVENOUS at 20:25

## 2021-08-25 RX ADMIN — Medication 100 MG: at 15:58

## 2021-08-25 RX ADMIN — FOLIC ACID 1 MG: 1 TABLET ORAL at 15:58

## 2021-08-25 RX ADMIN — Medication 50 MG: at 09:12

## 2021-08-25 RX ADMIN — AMLODIPINE BESYLATE 5 MG: 5 TABLET ORAL at 09:12

## 2021-08-25 RX ADMIN — ATORVASTATIN CALCIUM 80 MG: 80 TABLET, FILM COATED ORAL at 20:26

## 2021-08-25 RX ADMIN — METOPROLOL TARTRATE 25 MG: 50 TABLET, FILM COATED ORAL at 20:26

## 2021-08-25 RX ADMIN — ENOXAPARIN SODIUM 40 MG: 40 INJECTION SUBCUTANEOUS at 09:14

## 2021-08-25 RX ADMIN — TAMSULOSIN HYDROCHLORIDE 0.4 MG: 0.4 CAPSULE ORAL at 09:12

## 2021-08-25 RX ADMIN — DEXTROSE MONOHYDRATE 250 ML: 50 INJECTION, SOLUTION INTRAVENOUS at 08:59

## 2021-08-25 RX ADMIN — SODIUM CHLORIDE, PRESERVATIVE FREE 10 ML: 5 INJECTION INTRAVENOUS at 09:04

## 2021-08-25 ASSESSMENT — PATIENT HEALTH QUESTIONNAIRE - PHQ9: SUM OF ALL RESPONSES TO PHQ QUESTIONS 1-9: 8

## 2021-08-25 ASSESSMENT — PAIN SCALES - GENERAL
PAINLEVEL_OUTOF10: 0

## 2021-08-25 NOTE — CONSULTS
Brief Intervention and Referral to Treatment Summary    Patient was provided PHQ-9, AUDIT and DAST Screening:      PHQ-9 Score: 8  AUDIT Score:  15  DAST Score:  0    Patients substance use is considered   Harmful    Patients depression is considered:     Mild     Brief Education Was Provided    Patient was not receptive    Brief Intervention Is Provided (Only for AUDIT or DAST)     Patient denies readiness to decrease and/or stop use and a plan was not discussed    Recommendations/Referrals for Brief and/or Specialized Treatment Provided to Patient     Denied resources

## 2021-08-25 NOTE — PROGRESS NOTES
[address] [facility] THERAPY MISSED TREATMENT NOTE  [facility] ICU STEPDOWN TELEMETRY 4K  [id number]      Date: 2021  Patient Name: Elisabeth Zee        CSN: [id number]   : 1945  (78 y.o.)  Gender: male                REASON FOR MISSED TREATMENT: Patient Refused; RN approved session once vitals were taken. Patient supine in bed upon OT arrival with nursing and daughter present in room. Edu patient on purpose/benefits of OT with patient stating, \"I really don't think I need therapy, I can get up okay\", reports also being disappointed the doctor told him he isn't allowed to go home today, and continued to decline therapy at this time. OT to attempt again at another date.

## 2021-08-25 NOTE — PROGRESS NOTES
Pharmacy Medication History Note      List of current medications patient is taking is complete. Source of information: patient, Epic dispense report    Changes made to medication list:  Medications removed (include reason, ex. therapy complete or physician discontinued):  None    Medications added/doses adjusted:  Spironolactone 25 mg daily added    Other notes (ex. Recent course of antibiotics, Coumadin dosing):  Patient has been out of his aspirin 81 mg for about a week. He just needs to get more from the store. Patient is a unsure about some of the medications he takes. He has filled furosemide in the past but according to his 8/3/21 cardiology visit; he stopped it. But then he told me he was not sure if he was taking furosemide or not. Denies use of other OTC or herbal medications.         Electronically signed by HIRO Hardy [facility] on 8/24/2021 at 8:30 PM

## 2021-08-25 NOTE — PROGRESS NOTES
Renal Progress Note    Assessment and Plan:   1. Hyponatremia likely multifactorial.  2. COPD  3. Hypertension primary  4. Metabolic acidosis may be alcohol-related  5. Thrombocytopenia new    PLAN:  1. I discussed my thoughts with the patient  2. He understood  3. I addressed his questions  4. Labs reviewed. 5. Serum sodium was rising at a faster rate than expected this morning when I saw him  6. I gave him  250 ml 5% dextrose  7. Serum sodium now is rising at expected  rate   8. Continue to hold intravenous fluid for now  9. CT of the thorax to rule out  possible pulmonary pathology with long history of heavy use of tobacco.  10. Decrease serum sodium check to every 6 hours from every 4 hours. 11. This was discussed with the patient. Patient Active Problem List:     Precordial pain     Angina at rest [facility])     S/P cardiac cath: 1/25/2018: 70-75% mid-LAD. LCX OK. RCA 20%. LVEF 454-50%. LVEDP 12 mmHg. S/P PTCA: 1/25/2018: FFR-guided stenting of mid-LAD Xience 2.75x28 mm, post-dilated to 3.78 mm proximally.      Chronic obstructive pulmonary disease, unspecified ([address]     Hyponatremia      Subjective:   Admit Date: 8/24/2021    Interval History:   Seen for hyponatremia  Awake and alert this morning  No complaints  Updated by the staff  No new issues  Blood pressure is stable  Urine output is not documented      Medications:   Scheduled Meds:   thiamine  100 mg Oral Daily    folic acid  1 mg Oral Daily    amLODIPine  5 mg Oral Daily    aspirin  81 mg Oral Daily    atorvastatin  80 mg Oral Nightly    levothyroxine  25 mcg Oral Daily    [Held by provider] lisinopril  10 mg Oral BID    metoprolol tartrate  25 mg Oral BID    prasugrel  10 mg Oral Daily    tamsulosin  0.4 mg Oral Daily    sodium chloride flush  5-40 mL IntraVENous 2 times per day    enoxaparin  40 mg Subcutaneous Daily    nicotine  1 patch Transdermal Q24H    escitalopram  5 mg Oral Daily    sodium chloride  1 g Oral BID WC Continuous Infusions:   sodium chloride         CBC:   Recent Labs     08/24/21  0940 08/25/21  0442   WBC 5.4 6.1   HGB 15.6 14.7    127*     CMP:    Recent Labs     08/23/21  1033 08/23/21  1033 08/24/21  0940 08/24/21  0940 08/24/21  1310 08/24/21  1703 08/24/21  2103 08/25/21  0442 08/25/21  0823 08/25/21  1342   *   < > 117*   < >  --  119*   < > 124* 124* 124*   K 5.2*   < > 5.5*  --  5.9* 5.2  --  4.6  --   --    CL 85*  --  84*  --   --   --   --  91*  --   --    CO2 23  --  22*  --   --   --   --  19*  --   --    BUN 11  --  11  --   --   --   --  10  --   --    CREATININE 1.00  --  0.9  --   --   --   --  0.7  --   --    GLUCOSE 117*  --  103  --   --   --   --  78  --   --    CALCIUM 9.1  --  9.2  --   --   --   --  8.5  --   --    LABGLOM  --   --  82*  --   --   --   --  >90  --   --     < > = values in this interval not displayed. Troponin: No results for input(s): TROPONINI in the last 72 hours. BNP: No results for input(s): BNP in the last 72 hours. INR: No results for input(s): INR in the last 72 hours. Lipids: No results for input(s): CHOL, LDLDIRECT, TRIG, HDL, AMYLASE, LIPASE in the last 72 hours. Liver:   Recent Labs     08/24/21  0940   AST 37   ALT 46   ALKPHOS 90   PROT 6.6   LABALBU 4.4   BILITOT 0.6     Iron:  No results for input(s): IRONS, FERRITIN in the last 72 hours. Invalid input(s): LABIRONS  XR CHEST (2 VW)   Final Result   No interval change since previous study dated 12 February 2016, no acute cardiopulmonary disease         **This report has been created using voice recognition software. It may contain minor errors which are inherent in voice recognition technology. **      Final report electronically signed by DR Shona Ahmadi on 8/24/2021 10:36 AM            Objective:   Vitals: /73   Pulse 60   Temp 98.7 °F (37.1 °C) (Oral)   Resp 18   Ht 5' 11\" (1.803 m)   Wt 214 lb 14.4 oz (97.5 kg)   SpO2 95%   BMI 29.97 kg/m²    Wt Readings from Last 3 Encounters:   08/24/21 214 lb 14.4 oz (97.5 kg)   08/03/21 229 lb 8 oz (104.1 kg)   03/11/21 240 lb (108.9 kg)      24HR INTAKE/OUTPUT:      Intake/Output Summary (Last 24 hours) at 8/25/2021 1700  Last data filed at 8/25/2021 1416  Gross per 24 hour   Intake 2047.13 ml   Output 425 ml   Net 1622.13 ml       Constitutional: Well-developed elderly gentleman alert, awake, no apparent distress   Skin:normal with no rash or lesions  HEENT:Pupils are reactive . Throat is clear . Oral mucosa is moist   Neck:supple with no thyromegaly or carotid bruit  Cardiovascular:  S1, S2 without murmur or rubs   Respiratory: Decreased breath sound bilaterally  Abdomen: +bs, soft, no tenderness to palpation and no palpable mass. No abdominal bruit   Ext: No LE edema  Musculoskeletal:Intact  Neuro:Alert and awake. Well oriented  with no focal deficit. Speech is normal      Electronically signed by Tapan Turcios MD on 8/25/2021 at 5:00 PM  **This report has been created using voice recognition software. It maycontain minor  errors which are inherent in voice recognition technology. **

## 2021-08-25 NOTE — CARE COORDINATION
8/25/21, 12:06 PM EDT  DISCHARGE PLANNING EVALUATION:    Carolyne Flores       Admitted: 8/24/2021/ [facility] day: 1   Location: [facility]03/003-A Reason for admit: Hyponatremia [E87.1]  Fatigue, unspecified type [R53.83]   PMH:  has a past medical history of Angina at rest [facility]), Hypertension, Precordial pain, S/P cardiac cath: 1/25/2018: 70-75% mid-LAD. LCX OK. RCA 20%. LVEF 454-50%. LVEDP 12 mmHg., S/P PTCA: 1/25/2018: FFR-guided stenting of mid-LAD Xience 2.75x28 mm, post-dilated to 3.78 mm proximally. , and Thyroid disease. Procedure:   8/24 CXR: COPD  8/26 CT Chest  There is an 8 mm cavitary lesion in the left upper lobe  Barriers to Discharge:  Hyponatremia. History Alcohol use, smoker. Na+ 129; Sodium tabs continued. Nephrology following  PCP: Kasia Connelly MD  Readmission Risk Score: 12%    Patient Goals/Plan/Treatment Preferences: plans home alone independently as PTA; daughter Serene open to [facility] if needed; has cane; therapy following, has cane; current w CHF Clinic; Addiction Services (refused), therapy following  Transportation/Food Security/Housekeeping Addressed:  No issues identified.

## 2021-08-25 NOTE — PROGRESS NOTES
Pt admitted to  4K3 in a wheelchair. Complaints: None. IV normal saline infusing into the antecubital right, condition patent and no redness at a rate of 75 mls/ hour with about 500 mls in the bag still. IV site free of s/s of infection or infiltration. Vital signs obtained. Assessment and data collection initiated. Two nurse skin assessment performed by [facility] RN and [facility]. Oriented to room. Policies and procedures for  explained. All questions answered with no further questions at this time. Fall prevention and safety brochure discussed with patient. Bed alarm on. Call light in reach. Would you like your Primary Care Physician notified?  no   The best day to schedule a follow up Dr appointment is:  Monday p.m.

## 2021-08-25 NOTE — PROGRESS NOTES
IM Progress Note  Dr. Niktia Lee  8/25/2021 12:43 PM      Patient name Dangelo Em  [id number]  PCP: Alisia García MD  Admit Date: 8/24/2021  Acct No. [de-identified]    Subjective: Interval History:   Pt lying in bed  No headache dizziness N/V  Was sent in from cardiology office for abnormal labs  Still continues to drink 6-8 beers per day   Also continues to smoke   Offered help multiple times and recommended to quit   Pt has not done either    Diet: ADULT DIET; Regular    I/O last 3 completed shifts: In: 250 [P.O.:250]  Out: 225 [Urine:225]  No intake/output data recorded. Admission weight: 229 lb 8 oz (104.1 kg) as of 8/24/2021  9:35 AM  Wt Readings from Last 3 Encounters:   08/24/21 214 lb 14.4 oz (97.5 kg)   08/03/21 229 lb 8 oz (104.1 kg)   03/11/21 240 lb (108.9 kg)     Body mass index is 29.97 kg/m².     ROS   CVS;  no cp or palpitation  Resp: no SOB or cough  Neuro:  No numbness or weakness or dizziness  Abd: no nausea or vomiting or abd pain      Medications:   Scheduled Meds:   amLODIPine  5 mg Oral Daily    aspirin  81 mg Oral Daily    atorvastatin  80 mg Oral Nightly    levothyroxine  25 mcg Oral Daily    [Held by provider] lisinopril  10 mg Oral BID    metoprolol tartrate  25 mg Oral BID    prasugrel  10 mg Oral Daily    tamsulosin  0.4 mg Oral Daily    sodium chloride flush  5-40 mL IntraVENous 2 times per day    enoxaparin  40 mg Subcutaneous Daily    nicotine  1 patch Transdermal Q24H    vitamin B-1  50 mg Oral Daily    escitalopram  5 mg Oral Daily    sodium chloride  1 g Oral BID WC     Continuous Infusions:   sodium chloride         Labs :     CBC:   Recent Labs     08/24/21  0940 08/25/21  0442   WBC 5.4 6.1   HGB 15.6 14.7    127*     BMP:    Recent Labs     08/23/21  1033 08/23/21  1033 08/24/21  0940 08/24/21  0940 08/24/21  1310 08/24/21  1703 08/24/21  2103 08/24/21  2350 08/25/21  0442 08/25/21  0823   *   < > 117*   < >  --  119*   < > 121* 124* 124*   K 5.2*   < > 5.5*  --  5.9* 5.2  --   --  4.6  --    CL 85*  --  84*  --   --   --   --   --  91*  --    CO2 23  --  22*  --   --   --   --   --  19*  --    BUN 11  --  11  --   --   --   --   --  10  --    CREATININE 1.00  --  0.9  --   --   --   --   --  0.7  --    GLUCOSE 117*  --  103  --   --   --   --   --  78  --     < > = values in this interval not displayed. Hepatic:   Recent Labs     08/24/21  0940   AST 37   ALT 46   BILITOT 0.6   ALKPHOS 90     Troponin: No results for input(s): TROPONINI in the last 72 hours. BNP: No results for input(s): BNP in the last 72 hours. Lipids: No results for input(s): CHOL, HDL in the last 72 hours. Invalid input(s): LDLCALCU  INR: No results for input(s): INR in the last 72 hours.     Radiology    Objective:   Vitals: /64   Pulse 63   Temp 98 °F (36.7 °C) (Oral)   Resp 16   Ht 5' 11\" (1.803 m)   Wt 214 lb 14.4 oz (97.5 kg)   SpO2 91%   BMI 29.97 kg/m²   HEENT: Head:pupils react  Neck: supple  Lungs: clear to auscultation  Heart: regular rate and rhythm   Abdomen: soft BS heard   Extremities: warm no edema  Neurologic:  Alert, oriented X3    Impression:   :   Hyponatremia  Hyperkalemia  Alcohol abuse  CAD  CHF  Hyperlipidemia  Hypothyroidism  Nicotine use        Plan:    Await f/u Na leve  Ambulate  Cont CIWA scale      Guru Renteria MD, MD

## 2021-08-26 ENCOUNTER — APPOINTMENT (OUTPATIENT)
Dept: CT IMAGING | Age: 76
DRG: 641 | End: 2021-08-26
Payer: MEDICARE

## 2021-08-26 LAB
ANION GAP SERPL CALCULATED.3IONS-SCNC: 9 MEQ/L (ref 8–16)
BUN BLDV-MCNC: 9 MG/DL (ref 7–22)
CALCIUM SERPL-MCNC: 8.6 MG/DL (ref 8.5–10.5)
CHLORIDE BLD-SCNC: 96 MEQ/L (ref 98–111)
CO2: 24 MEQ/L (ref 23–33)
CREAT SERPL-MCNC: 0.8 MG/DL (ref 0.4–1.2)
EKG ATRIAL RATE: 63 BPM
EKG P AXIS: 45 DEGREES
EKG Q-T INTERVAL: 432 MS
EKG QRS DURATION: 118 MS
EKG QTC CALCULATION (BAZETT): 385 MS
EKG R AXIS: -59 DEGREES
EKG T AXIS: 16 DEGREES
EKG VENTRICULAR RATE: 48 BPM
GFR SERPL CREATININE-BSD FRML MDRD: > 90 ML/MIN/1.73M2
GLUCOSE BLD-MCNC: 80 MG/DL (ref 70–108)
MRSA SCREEN: NORMAL
POTASSIUM REFLEX MAGNESIUM: 4.1 MEQ/L (ref 3.5–5.2)
SODIUM BLD-SCNC: 127 MEQ/L (ref 135–145)
SODIUM BLD-SCNC: 127 MEQ/L (ref 135–145)
SODIUM BLD-SCNC: 129 MEQ/L (ref 135–145)

## 2021-08-26 PROCEDURE — 87206 SMEAR FLUORESCENT/ACID STAI: CPT

## 2021-08-26 PROCEDURE — 6370000000 HC RX 637 (ALT 250 FOR IP): Performed by: INTERNAL MEDICINE

## 2021-08-26 PROCEDURE — 71270 CT THORAX DX C-/C+: CPT

## 2021-08-26 PROCEDURE — 86480 TB TEST CELL IMMUN MEASURE: CPT

## 2021-08-26 PROCEDURE — 36415 COLL VENOUS BLD VENIPUNCTURE: CPT

## 2021-08-26 PROCEDURE — 93010 ELECTROCARDIOGRAM REPORT: CPT | Performed by: INTERNAL MEDICINE

## 2021-08-26 PROCEDURE — 6360000004 HC RX CONTRAST MEDICATION: Performed by: INTERNAL MEDICINE

## 2021-08-26 PROCEDURE — 80048 BASIC METABOLIC PNL TOTAL CA: CPT

## 2021-08-26 PROCEDURE — 97535 SELF CARE MNGMENT TRAINING: CPT

## 2021-08-26 PROCEDURE — 99232 SBSQ HOSP IP/OBS MODERATE 35: CPT | Performed by: INTERNAL MEDICINE

## 2021-08-26 PROCEDURE — 93005 ELECTROCARDIOGRAM TRACING: CPT | Performed by: INTERNAL MEDICINE

## 2021-08-26 PROCEDURE — 97165 OT EVAL LOW COMPLEX 30 MIN: CPT

## 2021-08-26 PROCEDURE — 99223 1ST HOSP IP/OBS HIGH 75: CPT | Performed by: INTERNAL MEDICINE

## 2021-08-26 PROCEDURE — 6360000002 HC RX W HCPCS: Performed by: REGISTERED NURSE

## 2021-08-26 PROCEDURE — 84295 ASSAY OF SERUM SODIUM: CPT

## 2021-08-26 PROCEDURE — 6360000002 HC RX W HCPCS: Performed by: INTERNAL MEDICINE

## 2021-08-26 PROCEDURE — 2060000000 HC ICU INTERMEDIATE R&B

## 2021-08-26 PROCEDURE — 2580000003 HC RX 258: Performed by: REGISTERED NURSE

## 2021-08-26 PROCEDURE — 87116 MYCOBACTERIA CULTURE: CPT

## 2021-08-26 PROCEDURE — 6370000000 HC RX 637 (ALT 250 FOR IP): Performed by: REGISTERED NURSE

## 2021-08-26 RX ORDER — IPRATROPIUM BROMIDE AND ALBUTEROL SULFATE 2.5; .5 MG/3ML; MG/3ML
1 SOLUTION RESPIRATORY (INHALATION) EVERY 4 HOURS PRN
Status: DISCONTINUED | OUTPATIENT
Start: 2021-08-26 | End: 2021-08-27 | Stop reason: HOSPADM

## 2021-08-26 RX ORDER — ATROPINE SULFATE 1 MG/ML
0.5 INJECTION, SOLUTION INTRAMUSCULAR; INTRAVENOUS; SUBCUTANEOUS
Status: ACTIVE | OUTPATIENT
Start: 2021-08-26 | End: 2021-08-26

## 2021-08-26 RX ORDER — ATROPINE SULFATE 0.1 MG/ML
INJECTION INTRAVENOUS
Status: DISPENSED
Start: 2021-08-26 | End: 2021-08-26

## 2021-08-26 RX ADMIN — Medication 100 MG: at 09:11

## 2021-08-26 RX ADMIN — METOPROLOL TARTRATE 25 MG: 50 TABLET, FILM COATED ORAL at 09:12

## 2021-08-26 RX ADMIN — ASPIRIN 81 MG: 81 TABLET, CHEWABLE ORAL at 09:11

## 2021-08-26 RX ADMIN — Medication 1 G: at 09:11

## 2021-08-26 RX ADMIN — FOLIC ACID 1 MG: 1 TABLET ORAL at 09:11

## 2021-08-26 RX ADMIN — SODIUM CHLORIDE, PRESERVATIVE FREE 10 ML: 5 INJECTION INTRAVENOUS at 19:40

## 2021-08-26 RX ADMIN — PRASUGREL 10 MG: 10 TABLET, FILM COATED ORAL at 09:11

## 2021-08-26 RX ADMIN — SODIUM CHLORIDE, PRESERVATIVE FREE 10 ML: 5 INJECTION INTRAVENOUS at 09:12

## 2021-08-26 RX ADMIN — ENOXAPARIN SODIUM 40 MG: 40 INJECTION SUBCUTANEOUS at 09:12

## 2021-08-26 RX ADMIN — LEVOTHYROXINE SODIUM 25 MCG: 0.03 TABLET ORAL at 05:50

## 2021-08-26 RX ADMIN — ESCITALOPRAM 5 MG: 10 TABLET, FILM COATED ORAL at 09:12

## 2021-08-26 RX ADMIN — IOPAMIDOL 80 ML: 755 INJECTION, SOLUTION INTRAVENOUS at 08:12

## 2021-08-26 RX ADMIN — AMLODIPINE BESYLATE 5 MG: 5 TABLET ORAL at 09:12

## 2021-08-26 RX ADMIN — ATORVASTATIN CALCIUM 80 MG: 80 TABLET, FILM COATED ORAL at 19:40

## 2021-08-26 RX ADMIN — Medication 1 G: at 17:43

## 2021-08-26 RX ADMIN — TAMSULOSIN HYDROCHLORIDE 0.4 MG: 0.4 CAPSULE ORAL at 09:11

## 2021-08-26 ASSESSMENT — PAIN SCALES - GENERAL
PAINLEVEL_OUTOF10: 0

## 2021-08-26 NOTE — PROGRESS NOTES
Spoke with Abiel Ojeda regarding code status. Discussed the risks of leaving the hospital before completing testing and pacemaker placement. He stated he understands the risks and he is okay with it. He stated he is tired and if he dies, he's okay with that. He stated he wants to go home for a few weeks, then schedule follow up appointments with cardiology and pulmonology to discuss further treatment plans but he does not want any resuscitative measures performs in the event of cardiopulmonary arrest. He also stated his son and daughter know his wishes. Updated primary nurse and Dr. Rock Vela on pt's wishes.

## 2021-08-26 NOTE — CONSULTS
[facility]'s  Cardiology Consult        Patient:  Sophy Haile  YOB: 1945  MRN: [id number]     Acct: [de-identified]    PCP: Margarita Otero MD    Date of Admission: 8/24/2021      Reason for Consultation:  Irregular HR      History Of Present Illness:    78 y.o. pleasant male who presented to the hospital with complaints of hyponatremia. Patient was recently started on Spironolactone. Patient has a past medical history of CHF preserved EF, CAD s/p PCI 2018, HTN, HLD, alcohol abuse. Cardiology was consulted due to irregular HR. Patient's HR was reported to have gone into the 20s and 30s. Patient denies any cardiac complaints. Patient denies chest pain, palpitations, lightheadedness, dizziness, orthopnea, pedal edema. Patient states he is fatigued and has some SOB. Patient stated to me that he did not want any interventions until after he is discharged from hospital. Patient states he would like to go home for a few weeks and then follow up with potential intervention such as pacemaker treatment. Patient states he understands the risks and benefits. Patient is on ASA, Lovenox, Effient, Statin. HR 53 while in room. EKG:  Sinus rhythm with 2nd degree A-V block (Mobitz I)  Left axis deviation  Low voltage QRS, consider pulmonary disease, pericardial effusion, or normal variant  Incomplete right bundle branch block  Inferior infarct (cited on or before 25-JAN-2018)  Cannot rule out Anterior infarct (cited on or before 10-MARBELLA-2018)  Abnormal ECG    Echo: (3/9/2021)  Normal left ventricular size and systolic function. There were no regional wall motion abnormalities. Wall thickness was within normal limits. Ejection fraction was estimated at 55-60%. Doppler parameters were consistent with abnormal left ventricular   relaxation (grade 1 diastolic dysfunction).    IVC size is within normal limits with normal respiratory phasic changes      Past Medical History: Diagnosis Date    Angina at rest [facility]) 1/17/2018    Hypertension     Precordial pain 1/17/2018    S/P cardiac cath: 1/25/2018: 70-75% mid-LAD. LCX OK. RCA 20%. LVEF 454-50%. LVEDP 12 mmHg. 1/25/2018 1/25/2018: 70-75% mid-LAD. LCX OK. RCA 20%. LVEF 454-50%. LVEDP 12 mmHg. Dr. Marry Ann S/P PTCA: 1/25/2018: FFR-guided stenting of mid-LAD Xience 2.75x28 mm, post-dilated to 3.78 mm proximally. 1/25/2018 1/25/2018: FFR-guided stenting of mid-LAD Xience 2.75x28 mm, post-dilated to 3.78 mm proximally. Dr. Marry Ann Thyroid disease        Past Surgical History:          Procedure Laterality Date    CARDIAC CATHETERIZATION      CATARACT REMOVAL Bilateral 2017    CATARACT REMOVAL Bilateral     JOINT REPLACEMENT Left 2010    KNEE SURGERY Bilateral     scopes    PTCA         Medications Prior to Admission:      Prior to Admission medications    Medication Sig Start Date End Date Taking? Authorizing Provider   spironolactone (ALDACTONE) 25 MG tablet Take 25 mg by mouth daily   Yes Historical Provider, MD   amLODIPine (NORVASC) 5 MG tablet TAKE 1 TABLET BY MOUTH ONE TIME A DAY 8/3/21  Yes Belle Banks PA-C   escitalopram (LEXAPRO) 10 MG tablet Take 10 mg by mouth daily   Yes Historical Provider, MD   prasugrel (EFFIENT) 10 MG TABS Take 1 tablet by mouth daily 3/11/21  Yes Corina Casas MD   atorvastatin (LIPITOR) 80 MG tablet TAKE 1 TABLET BY MOUTH ONE TIME A DAY AT NIGHT 3/11/21  Yes Corina Casas MD   lisinopril (PRINIVIL;ZESTRIL) 10 MG tablet Take 10 mg by mouth 2 times daily   Yes Historical Provider, MD   nitroGLYCERIN (NITROSTAT) 0.4 MG SL tablet Place 1 tablet under the tongue every 5 minutes as needed for Chest pain up to max of 3 total doses.  If no relief after 1 dose, call 911. 3/5/19  Yes Farshad Guard, APRN - CNP   metoprolol tartrate (LOPRESSOR) 25 MG tablet TAKE 1 TABLET BY MOUTH TWO TIMES A DAY 3/5/19  Yes Farshad Guard, APRN - CNP   aspirin 81 MG chewable tablet Take 1 tablet by mouth daily 1/26/18  Yes Kt Harkins MD   levothyroxine (SYNTHROID) 25 MCG tablet Take 25 mcg by mouth Daily   Yes Historical Provider, MD   tamsulosin (FLOMAX) 0.4 MG capsule Take 0.4 mg by mouth daily   Yes Historical Provider, MD   Compression Stockings MISC by Does not apply route 3/11/21   Jero Singh MD       Current Facility-Administered Medications   Medication Dose Route Frequency Provider Last Rate Last Admin    atropine 1 MG/10ML injection             atropine injection 0.5 mg  0.5 mg IntraVENous Once PRN Kasia Connelly MD        ipratropium-albuterol (DUONEB) nebulizer solution 1 ampule  1 ampule Inhalation Q4H PRN Carmen Waters V, DO        thiamine tablet 100 mg  100 mg Oral Daily Kasia Connelly MD   100 mg at 51/26/91 6622    folic acid (FOLVITE) tablet 1 mg  1 mg Oral Daily Kasia Connelly MD   1 mg at 08/26/21 0911    [Held by provider] amLODIPine (NORVASC) tablet 5 mg  5 mg Oral Daily Satnam Castillo MD   5 mg at 08/26/21 9914    aspirin chewable tablet 81 mg  81 mg Oral Daily Satnam Castillo MD   81 mg at 08/26/21 0911    atorvastatin (LIPITOR) tablet 80 mg  80 mg Oral Nightly Satnam Castillo MD   80 mg at 08/25/21 2026    levothyroxine (SYNTHROID) tablet 25 mcg  25 mcg Oral Daily Satnam Castillo MD   25 mcg at 08/26/21 0550    [Held by provider] lisinopril (PRINIVIL;ZESTRIL) tablet 10 mg  10 mg Oral BID Satnam Castillo MD       [facility] [place] by provider] metoprolol tartrate (LOPRESSOR) tablet 25 mg  25 mg Oral BID Satnam Castillo MD   25 mg at 08/26/21 0912    prasugrel (EFFIENT) tablet 10 mg  10 mg Oral Daily Satnam Castillo MD   10 mg at 08/26/21 0911    tamsulosin (FLOMAX) capsule 0.4 mg  0.4 mg Oral Daily Satnam Castillo MD   0.4 mg at 08/26/21 0911    sodium chloride flush 0.9 % injection 5-40 mL  5-40 mL IntraVENous 2 times per day Samul Douglas, APRN - CNP   10 mL at 08/26/21 0912    sodium chloride flush 0.9 % injection 5-40 mL  5-40 mL IntraVENous PRN Morna Comment Mariely, APRN - CNP        0.9 % sodium chloride infusion  25 mL IntraVENous PRN Yesica Garfield, APRN - CNP        enoxaparin (LOVENOX) injection 40 mg  40 mg Subcutaneous Daily Yesica Garfield, APRN - CNP   40 mg at 08/26/21 0912    ondansetron (ZOFRAN-ODT) disintegrating tablet 4 mg  4 mg Oral Q8H PRN Yesica Garfield, APRN - CNP        Or    ondansetron (ZOFRAN) injection 4 mg  4 mg IntraVENous Q6H PRN Yesica Garfield, APRN - CNP        acetaminophen (TYLENOL) tablet 650 mg  650 mg Oral Q6H PRN Yesica Garfield, APRN - CNP        Or    acetaminophen (TYLENOL) suppository 650 mg  650 mg Rectal Q6H PRN Yesica Garfield, APRN - CNP        nicotine (NICODERM CQ) 21 MG/24HR 1 patch  1 patch Transdermal Q24H Yesica Garfield, APRN - CNP   1 patch at 08/26/21 1331    LORazepam (ATIVAN) tablet 1 mg  1 mg Oral Q1H PRN Yesica Garfield, APRN - CNP        Or    LORazepam (ATIVAN) injection 1 mg  1 mg IntraVENous Q1H PRN Yesica Garfield, APRN - CNP        Or    LORazepam (ATIVAN) tablet 2 mg  2 mg Oral Q1H PRN Yesica Garfield, APRN - CNP        Or    LORazepam (ATIVAN) injection 2 mg  2 mg IntraVENous Q1H PRN Yesica Garfield, APRN - CNP        Or    LORazepam (ATIVAN) tablet 3 mg  3 mg Oral Q1H PRN Yesica Garfield, APRN - CNP        Or    LORazepam (ATIVAN) injection 3 mg  3 mg IntraVENous Q1H PRN Yesica Garfield, APRN - CNP        Or    LORazepam (ATIVAN) tablet 4 mg  4 mg Oral Q1H PRN Yesica Garfield, APRN - CNP        Or    LORazepam (ATIVAN) injection 4 mg  4 mg IntraVENous Q1H PRN Yesica Garfield, APRN - CNP        escitalopram (LEXAPRO) tablet 5 mg  5 mg Oral Daily Manuel Sanders MD   5 mg at 08/26/21 0912    sodium chloride tablet 1 g  1 g Oral BID WC Manuel Sanders MD   1 g at 08/26/21 0911       Allergies:  Patient has no known allergies. Social History:    TOBACCO:   reports that he has been smoking cigarettes. He has a 37.50 pack-year smoking history.  He quit smokeless tobacco use about 7 years ago.  ETOH:   reports current alcohol use. Family History:        Problem Relation Age of Onset    Cancer Mother         breast    Heart Disease Father          Review of Systems -   General ROS: negative  Psychological ROS: negative  Hematological and Lymphatic ROS: No history of blood clots or bleeding disorder. Respiratory ROS: no cough, admits to shortness of breath, denies wheezing  Cardiovascular ROS: As per HPI  Gastrointestinal ROS: negative  Genito-Urinary ROS: no dysuria, trouble voiding, or hematuria  Musculoskeletal ROS: negative  Neurological ROS: no TIA or stroke symptoms  Dermatological ROS: negative    All others reviewed and are negative.        /83   Pulse (!) 47   Temp 98.4 °F (36.9 °C) (Oral)   Resp 16   Ht 5' 11\" (1.803 m)   Wt 214 lb 14.4 oz (97.5 kg)   SpO2 97%   BMI 29.97 kg/m²       Physical Examination:   General appearance - alert, in no distress  Mental status - alert, oriented to person, place, and time  Neck - supple, no significant adenopathy, no JVD, or carotid bruits  Chest - clear to auscultation, no wheezes, rales or rhonchi, symmetric air entry  Heart - normal rate, regular rhythm, normal S1, S2, no murmurs, rubs, clicks or gallops  Abdomen - soft, nontender, nondistended, no masses or organomegaly  Neurological - alert, oriented, normal speech, no focal findings or movement disorder noted  Musculoskeletal - no joint tenderness, deformity or swelling  Extremities - peripheral pulses normal, no pedal edema, no clubbing or cyanosis  Skin - normal coloration and turgor, no rashes, no suspicious skin lesions noted      LABS:    Recent Labs     08/24/21  0940   TROPONINT < 0.010     CBC:   Lab Results   Component Value Date    WBC 6.1 08/25/2021    RBC 4.22 08/25/2021    RBC 0 06/08/2021    RBC 4.73 06/08/2021    HGB 14.7 08/25/2021    HCT 41.5 08/25/2021    MCV 98.3 08/25/2021    MCH 34.8 08/25/2021    MCHC 35.4 08/25/2021    RDW 13.4 06/08/2021     08/25/2021    MPV 9.7 08/25/2021     BMP:    Lab Results   Component Value Date     08/26/2021    K 4.1 08/26/2021    CL 96 08/26/2021    CO2 24 08/26/2021    BUN 9 08/26/2021    LABALBU 4.4 08/24/2021    CREATININE 0.8 08/26/2021    CALCIUM 8.6 08/26/2021    LABGLOM >90 08/26/2021    GLUCOSE 80 08/26/2021    GLUCOSE 117 08/23/2021     Hepatic Function Panel:    Lab Results   Component Value Date    ALKPHOS 90 08/24/2021    ALT 46 08/24/2021    AST 37 08/24/2021    PROT 6.6 08/24/2021    BILITOT 0.6 08/24/2021    BILITOT Negative 06/08/2021    LABALBU 4.4 08/24/2021     Magnesium:    Lab Results   Component Value Date    MG 1.8 08/24/2021     Warfarin PT/INR:  No components found for: PTPATWAR, PTINRWAR  HgBA1c:    Lab Results   Component Value Date    LABA1C 5.3 02/21/2020     FLP:    Lab Results   Component Value Date    TRIG 85 06/08/2021    HDL 61 06/08/2021    LDLCALC 39 06/08/2021    LABVLDL 17 06/08/2021     TSH:    Lab Results   Component Value Date    TSH 1.430 08/24/2021     BNP: No components found for: PRO-BNP      Assessment/Plan:    Patient Active Problem List   Diagnosis    Precordial pain    [facility]ina [facility] rest [facility])    S/P cardiac cath: 1/25/2018: 70-75% mid-LAD. LCX OK. RCA 20%. LVEF 454-50%. LVEDP 12 mmHg.  S/P PTCA: 1/25/2018: FFR-guided stenting of mid-LAD Xience 2.75x28 mm, post-dilated to 3.78 mm proximally.  Chronic obstructive pulmonary disease, unspecified (HCC)    Hyponatremia   Episodes of Bradycardia  2nd Degree Heart block Mobitz 1  Hx of CAD s/p PCI  Hx of HLD  Hx of HTN    Patient states he denies cardiac symptoms but admits to SOB and fatigue  Would like Electrophysiology consult, however patient declines. He would prefer to wait a few weeks once he is discharged to follow up. Patient understands risk, benefits. States he fully understands risks. Continue to monitor electrolytes, treat abnormalities  Have patient follow up with cardiology upon discharge.          Please do note hesitate to contact me for any further questions. Thank you for the opportunity to be involved in this patient's care.     Code Status: Limited    Electronically signed by Lupis Carrington DO on 8/26/2021 at 4:30 PM

## 2021-08-26 NOTE — CONSULTS
No  Aggravating factors: No    He is having chest pain:No    PMHx   Past Medical History      Diagnosis Date    Angina at rest [facility]) 1/17/2018    Hypertension     Precordial pain 1/17/2018    S/P cardiac cath: 1/25/2018: 70-75% mid-LAD. LCX OK. RCA 20%. LVEF 454-50%. LVEDP 12 mmHg. 1/25/2018 1/25/2018: 70-75% mid-LAD. LCX OK. RCA 20%. LVEF 454-50%. LVEDP 12 mmHg. Dr. Elisa Dwyer S/P PTCA: 1/25/2018: FFR-guided stenting of mid-LAD Xience 2.75x28 mm, post-dilated to 3.78 mm proximally. 1/25/2018 1/25/2018: FFR-guided stenting of mid-LAD Xience 2.75x28 mm, post-dilated to 3.78 mm proximally.  Dr. Elisa Dwyer Thyroid disease       Past Surgical History        Procedure Laterality Date    CARDIAC CATHETERIZATION      CATARACT REMOVAL Bilateral 2017    CATARACT REMOVAL Bilateral     JOINT REPLACEMENT Left 2010    KNEE SURGERY Bilateral     scopes    PTCA       Meds    Current Medications    atropine        thiamine  100 mg Oral Daily    folic acid  1 mg Oral Daily    [Held by provider] amLODIPine  5 mg Oral Daily    aspirin  81 mg Oral Daily    atorvastatin  80 mg Oral Nightly    levothyroxine  25 mcg Oral Daily    [Held by provider] lisinopril  10 mg Oral BID    [Held by provider] metoprolol tartrate  25 mg Oral BID    prasugrel  10 mg Oral Daily    tamsulosin  0.4 mg Oral Daily    sodium chloride flush  5-40 mL IntraVENous 2 times per day    enoxaparin  40 mg Subcutaneous Daily    nicotine  1 patch Transdermal Q24H    escitalopram  5 mg Oral Daily    sodium chloride  1 g Oral BID WC     atropine, sodium chloride flush, sodium chloride, ondansetron **OR** ondansetron, acetaminophen **OR** acetaminophen, LORazepam **OR** LORazepam **OR** LORazepam **OR** LORazepam **OR** LORazepam **OR** LORazepam **OR** LORazepam **OR** LORazepam  IV Drips/Infusions   sodium chloride       Home Medications  Medications Prior to Admission: spironolactone (ALDACTONE) 25 MG tablet, Take 25 mg by mouth daily  amLODIPine (NORVASC) 5 MG tablet, TAKE 1 TABLET BY MOUTH ONE TIME A DAY  escitalopram (LEXAPRO) 10 MG tablet, Take 10 mg by mouth daily  prasugrel (EFFIENT) 10 MG TABS, Take 1 tablet by mouth daily  atorvastatin (LIPITOR) 80 MG tablet, TAKE 1 TABLET BY MOUTH ONE TIME A DAY AT NIGHT  lisinopril (PRINIVIL;ZESTRIL) 10 MG tablet, Take 10 mg by mouth 2 times daily  nitroGLYCERIN (NITROSTAT) 0.4 MG SL tablet, Place 1 tablet under the tongue every 5 minutes as needed for Chest pain up to max of 3 total doses. If no relief after 1 dose, call 911. metoprolol tartrate (LOPRESSOR) 25 MG tablet, TAKE 1 TABLET BY MOUTH TWO TIMES A DAY  aspirin 81 MG chewable tablet, Take 1 tablet by mouth daily  levothyroxine (SYNTHROID) 25 MCG tablet, Take 25 mcg by mouth Daily  tamsulosin (FLOMAX) 0.4 MG capsule, Take 0.4 mg by mouth daily  Compression Stockings MISC, by Does not apply route  Diet    ADULT DIET; Regular  Allergies    Patient has no known allergies. Social History     Social History     Socioeconomic History    Marital status:       Spouse name: Not on file    Number of children: Not on file    Years of education: Not on file    Highest education level: Not on file   Occupational History    Not on file   Tobacco Use    Smoking status: Current Every Day Smoker     Packs/day: 1.50     Years: 25.00     Pack years: 37.50     Types: Cigarettes    Smokeless tobacco: Former User     Quit date: 2014   Vaping Use    Vaping Use: Never used   Substance and Sexual Activity    Alcohol use: Yes     Comment: 6-10 beers daily    Drug use: No    Sexual activity: Never   Other Topics Concern    Not on file   Social History Narrative    Not on file     Social Determinants of Health     Financial Resource Strain:     Difficulty of Paying Living Expenses:    Food Insecurity:     Worried About Running Out of Food in the Last Year:     [address] in the Last Year:    Transportation Needs:     Lack of Transportation (Medical):  Lack of Transportation (Non-Medical):    Physical Activity:     Days of Exercise per Week:     Minutes of Exercise per Session:    Stress:     Feeling of Stress :    Social Connections:     Frequency of Communication with Friends and Family:     Frequency of Social Gatherings with Friends and Family:     Attends [employer] Services:     Active Member of Clubs or Organizations:     Attends Club or Organization Meetings:     Marital Status:    Intimate Partner Violence:     Fear of Current or Ex-Partner:     Emotionally Abused:     Physically Abused:     Sexually Abused:      Family History          Problem Relation Age of Onset    Cancer Mother         breast    Heart Disease Father      Sleep History    Never diagnosed with sleep apnea in the past.  Occupational history   Occupation:  He is current working: No  Type of profession: retired. History of tobacco smoking:Yes  Amount of tobacco smokin.0 PPD. Years of tobacco smokin.                                    Quit smoking: No.                . History of recreational or IV drug use in the past:NO     History of exposure to coal mines/coal dust: NO  History of exposure to foundry dust/welding: NO  History of exposure to quarry/silica/sandblasting: NO  History of exposure to asbestos/working with breaks/ships: NO  History of exposure to farm dust: NO  History of recent travel to long distances: NO  History of exposure to birds, pigeons, or chickens in the past:NO  Pet animals at home:Yes  Dogs: 2  Cats: 1    History of pulmonary embolism in the past: No            History of DVT in the past:Yes        [] Right lower extremity   [] Left lower extremity. Review of systems     Constitutional: Positive for activity change and fatigue. Negative for appetite change, chills, diaphoresis, fever and unexpected weight change.    HENT: Negative for congestion, dental problem, ear discharge, mouth sores, nosebleeds, sinus pain, tinnitus and trouble swallowing. Respiratory: Positive for apnea, cough and shortness of breath. Negative for choking, chest tightness, wheezing and stridor. Cardiovascular: Positive for leg swelling. Negative for chest pain and palpitations. Gastrointestinal: Positive for abdominal distention. Negative for abdominal pain, anal bleeding, blood in stool, constipation, diarrhea, nausea and vomiting. Genitourinary: Negative for dysuria, flank pain, hematuria and urgency. Musculoskeletal: Positive for myalgias. Negative for back pain, gait problem, neck pain and neck stiffness. Skin: Negative for color change, pallor, rash and wound. Neurological: Positive for weakness and light-headedness. Negative for dizziness, tremors, seizures, syncope, speech difficulty, numbness and headaches. Hematological: Negative for adenopathy. Does not bruise/bleed easily. Psychiatric/Behavioral: Negative for agitation, behavioral problems, confusion, decreased concentration, dysphoric mood and hallucinations. Vitals     height is 5' 11\" (1.803 m) and weight is 214 lb 14.4 oz (97.5 kg). His oral temperature is 98 °F (36.7 °C). His blood pressure is 116/74 and his pulse is 66. His respiration is 16 and oxygen saturation is 97%. Body mass index is 29.97 kg/m². SUPPLEMENTAL O2:       I/O        Intake/Output Summary (Last 24 hours) at 8/26/2021 1313  Last data filed at 8/26/2021 0334  Gross per 24 hour   Intake 2077.13 ml   Output 400 ml   Net 1677.13 ml     I/O last 3 completed shifts: In: 2197.1 [P.O.:520; I.V.:1677.1]  Out: 400 [Urine:400]   Patient Vitals for the past 96 hrs (Last 3 readings):   Weight   08/24/21 2015 214 lb 14.4 oz (97.5 kg)   08/24/21 1546 229 lb 8 oz (104.1 kg)       Exam   Nursing note and vitals reviewed. Constitutional:  General: He is not in acute distress. Appearance: Normal appearance. He is normal weight.  He is not ill-appearing, toxic-appearing or diaphoretic. HENT: Head: Normocephalic and atraumatic. Mouth: Mucous membranes are moist.      Pharynx: Oropharynx is clear. No oropharyngeal exudate or posterior oropharyngeal erythema. Neck:      Vascular: No carotid bruit. Cardiovascular:      Rate and Rhythm: Normal rate and regular rhythm. Pulses: Normal pulses. Heart sounds: Normal heart sounds. No murmur heard. No friction rub. No gallop. Pulmonary:      Effort: No respiratory distress. Breath sounds: No stridor, rales, wheezing or rhonchi. Chest:      Chest wall: No tenderness. Abdominal:      General: Abdomen is flat. Bowel sounds are normal. There is no distension. Palpations: Abdomen is soft. There is no mass. Tenderness: There is no abdominal tenderness. There is no right CVA tenderness, left CVA tenderness, guarding or rebound. Hernia: No hernia is present. Musculoskeletal:         General: No swelling, tenderness, deformity or signs of injury. Normal range of motion. Cervical back: Normal range of motion and neck supple. No rigidity or tenderness. Right lower leg: No edema. Left lower leg: No edema. Lymphadenopathy:      Cervical: No cervical adenopathy.        Labs  - [facility]   ABG  Lab Results   Component Value Date    PH 6.0 06/08/2021     No results found for: VERA Russo  CBC  Recent Labs     08/24/21  0940 08/25/21  0442   WBC 5.4 6.1   RBC 4.45* 4.22*   HGB 15.6 14.7   HCT 42.7 41.5*   MCV 96.0* 98.3*   MCH 35.1* 34.8*   MCHC 36.5* 35.4    127*   MPV 9.6 9.7      BMP  Recent Labs     08/24/21  0940 08/24/21  1310 08/24/21  1703 08/24/21  2103 08/25/21  0442 08/25/21  0823 08/25/21  2235 08/26/21  0409 08/26/21  1002   *   < > 119*   < > 124*   < > 127* 129* 127*   K 5.5*   < > 5.2  --  4.6  --   --  4.1  --    CL 84*  --   --   --  91*  --   --  96*  --    CO2 22*  --   --   --  19* Abscess. WBC, Neutrophil WNL, abcess is les likely. Pending for acid fast stain, AFB stain  COPD: In the past, patient has been seen by Dr. Christina Lizama 2 years ago. He had and FPT ordered but hasn't had the test done. He also reported didn't use any inhaler at home. Denied any wheezing, worsening cough, change in sputum color. Patient rest comfortably on RA. O2 Sat at 93%. Chronic bronchitis  Obesity BMI 29.97    Plan   - Duoneb Q4PRN  - Full PFT with bronchodilator  - Follow up with Dr. Christina Lizama within 6 week after discharged  - CT high resolution of the chest if patient wants further work up  - Education about smoking cessation  - Prosper Gallo educated about my impression and plan. He verbalizes understanding  - Questions and concerns addressed. 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 Will see in office 6 weeks with f/u CT chest.    Patient seen and examined independently by me. Above discussed and I agree with medical resident's  note Also see my additional comments. Labs, cultures, and radiographs when available were reviewed. Changes were made in the orders as necessary. I discussed patient concerns with Tad's R.NAmina and instructions were given. Respiratory care issues addressed. Please see our orders for the updated patient care plan.     Electronically signed by     Chiara Mcdaniel MD on 8/26/2021 at 4:59 PM

## 2021-08-26 NOTE — PROGRESS NOTES
Spoke to pt earlier as his HR is in the 20's and 30's. Orders for atropine given   Pt has declined at this time   Says he wants nature to take its course and discussed code status with pt .  He wants to be limited code with no resuscitation

## 2021-08-26 NOTE — FLOWSHEET NOTE
08/26/21 1030   Provider Notification   Reason for Communication Evaluate   Provider Name Shyann Webster   Provider Notification Other (Comment)  (Resident)   Method of Communication Secure Message   Response See orders   Notification Time 1022     Patient's HR dropped to 30s. On tele looks like missing a beat, P waves do match up.      Via Lionel Aburto, resident physician, perfect served back to consult cardiology, hold metoprolol, and order EKG

## 2021-08-26 NOTE — CARE COORDINATION
DISCHARGE PLANNING UPDATE    Procedure:   8/24 CXR: COPD  8/26 CT Chest  There is an 8 mm cavitary lesion in the left upper lobe  Barriers to Discharge:  Hyponatremia. History Alcohol use, smoker. Na+ 129; Sodium tabs continued. Nephrology following    Discharge Plan:   plans home alone independently as PTA; daughter Theodis Mcburney open to [facility] if needed; has cane; therapy following, has cane; current w [facility];  Addiction Services (refused), therapy (signed off) following

## 2021-08-26 NOTE — PROGRESS NOTES
Oly [facility]eto 62  INPATIENT OCCUPATIONAL THERAPY  [facility] ICU STEPDOWN TELEMETRY 4K  EVALUATION    Time:   Time In: 8069  Time Out: 0945  Timed Code Treatment Minutes: 20 Minutes  Minutes: 31          Date: 2021  Patient Name: Sophie Lopez,   Gender: male      MRN: [id number]  : 1945  (78 y.o.)  Referring Practitioner: ANSELMO Hernandez  Diagnosis: Hyponatremia  Additional Pertinent Hx: per chart review; Sophie Lopez is a 78 y.o. male who presents to the emergency department as referred here by his heart failure clinic. Patient indicates that he had outpatient labs drawn and was told that his sodium and potassium numbers were off. Patient has had recent fatigue. No unilateral or focal sensory/motor deficits. No drooping face. No headaches. No other strokelike symptoms. Patient states that he does sometimes feel off balance if he stands up but denies any specific vertigo or near syncope. No chest pain or palpitations. No shortness of breath. No acute cough or wheezing. No fever or shaking chills. He is vaccinated against COVID-19. No abdominal pain. States that sometimes in the morning he will feel slightly nauseous. He is not nauseous currently. He has not vomited. He also notes that sometimes in the morning he will have loose stool. No watery diarrhea. No blood in the stool. No dysuria or hematuria. He has chronic lower extremity edema which is at baseline. Restrictions/Precautions:  Restrictions/Precautions: Fall Risk, General Precautions    Subjective  Chart Reviewed: Yes, Orders, Progress Notes, History and Physical  Patient assessed for rehabilitation services?: Yes  Family / Caregiver Present: No    Subjective: RN approved session. patient supine in bed upon OT arrival and stated he wanted to bathe and change his gown and agreeable to OT. A & O x 4. states he is going home today.     Pain:       Vitals: Vitals not assessed per clinical judgement, see nursing flowsheet    Social/Functional History:  Lives With: Alone  Type of Home: House  Home Layout: One level  Home Access: Stairs to enter with rails  Entrance Stairs - Number of Steps: 4-5  Home Equipment: [employer]   Bathroom Shower/Tub: Walk-in shower, Tub/Shower unit  Bathroom Toilet: Standard  Bathroom Equipment: Grab bars in shower, Shower chair  Bathroom Accessibility: Accessible       ADL Assistance: Independent  Homemaking Assistance: Independent  Ambulation Assistance: Independent  Transfer Assistance: Independent    Active : Yes  Occupation: Retired  Additional Comments: reports he owns 2 small dogs. VISION:Corrected    HEARING:  WFL    COGNITION: WFL    RANGE OF MOTION:  Right Upper Extremity: WFL  Left Upper Extremity:  WFL    STRENGTH:  Right Upper Extremity: WFL  Left Upper Extremity:  WFL    SENSATION:   WFL    ADL:   Grooming: Modified Independent. standing at sink with no AD  Bathing: Minimal Assistance. for back, s/u for rest of body standing at sink to complete sponge bath  Upper Extremity Dressing: Minimal Assistance. to change hospital gown  Lower Extremity Dressing: Independent. to don slippers and to pull down underwear to bathe sydnie area and pull back up  Toileting: Modified Independent. with use of grab bars for support. urinated in toilet in standing with no LOB  Toilet Transfer: Modified Independent. with use of grab bars. BALANCE:  Sitting Balance:  Independent. no LOB during dyn sitting tasks  Standing Balance: Independent. no LOB with no AD    BED MOBILITY:  Supine to Sit: Independent with no use of bed rails. completed in one smooth movement. TRANSFERS:  Sit to Stand:  Independent. no LOB during transiton from sit to stand. states he stands for a few seconds prior to walking    FUNCTIONAL MOBILITY:  Assistive Device: None  Assist Level: Independent. Distance: To and from bathroom  And from bathroom to recliner.       Exercise:  none completed this date    Activity Tolerance:  Patient tolerance of  treatment: good. No LOB , fatigue or SOB t/o evaluation/ ADLs. Assessment:  Assessment: Patient is able to complete his ADLs with little to no assist and uses no AD for tasks. reports prior to hospitalization he could barely stand long enough to shave at the sink; however he was able to complete his whole bathing and grooming routine standing at sink with no seated rest breaks. His late wife required assist due to a CVA and his house is s/u with modifications for safe transition home. Performance deficits / Impairments: Decreased endurance  Prognosis: Good  REQUIRES OT FOLLOW UP: No  No Skilled OT: No OT goals identified  Decision Making: Low Complexity    Treatment Initiated: Treatment and education initiated within context of evaluation. Evaluation time included review of current medical information, gathering information related to past medical, social and functional history, completion of standardized testing, formal and informal observation of tasks, assessment of data and development of plan of care and goals. Treatment time included skilled education and facilitation of tasks to increase safety and independence with ADL's for improved functional independence and quality of life. Discharge Recommendations:  Defer OT at this time    Patient Education:  OT Education: OT Role, Precautions, ADL Adaptive Strategies, Transfer Training  Patient Education: purpose/benefits of OT, importance of out of bed activities. Equipment Recommendations:  Equipment Needed: No    Plan:   .  See long-term goal time frame for expected duration of plan of care. If no long-term goals established, a short length of stay is anticipated. Goals:  Patient goals : return home at [facility], alone. Short term goals  Time Frame for Short term goals: no goals identified at this time due to no OT needs.          Following session, patient left in safe position with all fall risk precautions in place.

## 2021-08-26 NOTE — PROGRESS NOTES
IM Progress Note  Dr. De Leon Neighbours  8/26/2021 10:59 AM      Patient name Juan M Martins  [id number]  PCP: Doc Osei MD  Admit Date: 8/24/2021  Acct No. [de-identified]    Subjective: Interval History:   Pt up in chair  Informed HR intermittently in 30's but pt is asymptomatic  No dizziness   No cp      Diet: ADULT DIET; Regular    I/O last 3 completed shifts: In: 2197.1 [P.O.:520; I.V.:1677.1]  Out: 400 [Urine:400]  No intake/output data recorded. Admission weight: 229 lb 8 oz (104.1 kg) as of 8/24/2021  9:35 AM  Wt Readings from Last 3 Encounters:   08/24/21 214 lb 14.4 oz (97.5 kg)   08/03/21 229 lb 8 oz (104.1 kg)   03/11/21 240 lb (108.9 kg)     Body mass index is 29.97 kg/m².     ROS   CVS;  no cp or palpitation  Resp: no SOB or cough  Neuro:  No numbness or weakness or dizziness  Abd: no nausea or vomiting or abd pain      Medications:   Scheduled Meds:   atropine        thiamine  100 mg Oral Daily    folic acid  1 mg Oral Daily    [Held by provider] amLODIPine  5 mg Oral Daily    aspirin  81 mg Oral Daily    atorvastatin  80 mg Oral Nightly    levothyroxine  25 mcg Oral Daily    [Held by provider] lisinopril  10 mg Oral BID    [Held by provider] metoprolol tartrate  25 mg Oral BID    prasugrel  10 mg Oral Daily    tamsulosin  0.4 mg Oral Daily    sodium chloride flush  5-40 mL IntraVENous 2 times per day    enoxaparin  40 mg Subcutaneous Daily    nicotine  1 patch Transdermal Q24H    escitalopram  5 mg Oral Daily    sodium chloride  1 g Oral BID WC     Continuous Infusions:   sodium chloride         Labs :     CBC:   Recent Labs     08/24/21  0940 08/25/21  0442   WBC 5.4 6.1   HGB 15.6 14.7    127*     BMP:    Recent Labs     08/24/21  0940 08/24/21  1310 08/24/21  1703 08/24/21  2103 08/25/21  0442 08/25/21  0823 08/25/21  2235 08/26/21  0409 08/26/21  1002   *   < > 119*   < > 124*   < > 127* 129* 127*   K 5.5*   < > 5.2  --  4.6  --   --  4.1  --    CL 84*  -- --   --  91*  --   --  96*  --    CO2 22*  --   --   --  19*  --   --  24  --    BUN 11  --   --   --  10  --   --  9  --    CREATININE 0.9  --   --   --  0.7  --   --  0.8  --    GLUCOSE 103  --   --   --  78  --   --  80  --     < > = values in this interval not displayed. Hepatic:   Recent Labs     08/24/21  0940   AST 37   ALT 46   BILITOT 0.6   ALKPHOS 90     Troponin: No results for input(s): TROPONINI in the last 72 hours. BNP: No results for input(s): BNP in the last 72 hours. Lipids: No results for input(s): CHOL, HDL in the last 72 hours. Invalid input(s): LDLCALCU  INR: No results for input(s): INR in the last 72 hours.     Radiology    Objective:   Vitals: /72   Pulse 69   Temp 98.4 °F (36.9 °C) (Oral)   Resp 18   Ht 5' 11\" (1.803 m)   Wt 214 lb 14.4 oz (97.5 kg)   SpO2 93%   BMI 29.97 kg/m²   HEENT: Head:pupils react  Neck: supple  Lungs: clear to auscultation  Heart: regular rhythm  zane now  Abdomen: soft BS heard   Extremities: warm no edema  Neurologic:  Alert, oriented X3    Impression:   :   Bradycardia with type 1 2nd degree block  Hyponatremia  Hyperkalemia  Alcohol abuse  CAD  CHF  Hyperlipidemia  Hypothyroidism  Nicotine use  8mm cavitary lesion        Plan:    Hold AV blocking agents  Consult cardio  Atropine and pacer pads ordered for bedside  Consult ID and pulm  Spoke to ID place on airborne isolation    Dalia Alvarez MD, MD

## 2021-08-26 NOTE — PROGRESS NOTES
Renal Progress Note    Assessment and Plan:   1. Hyponatremia with serum sodium much improved  2. COPD  3. Hypertension primary controlled  4. Metabolic acidosis resolved  5. Thrombocytopenia   6. Left upper lobe lung nodule    PLAN:  1. Labs reviewed. 2. Serum sodium is much better  3. Discontinue every 6 hours serum sodium check  4. CT of the thorax report reviewed  5. There is left upper lobe lung lesion that is new  6. May need  pulmonary evaluation at the discretion of the primary service  7. We will follow    Patient Active Problem List:     Precordial pain     Angina at rest [facility])     S/P cardiac cath: 1/25/2018: 70-75% mid-LAD. LCX OK. RCA 20%. LVEF 454-50%. LVEDP 12 mmHg. S/P PTCA: 1/25/2018: FFR-guided stenting of mid-LAD Xience 2.75x28 mm, post-dilated to 3.78 mm proximally.      Chronic obstructive pulmonary disease, unspecified ([facility] [address]     Hyponatremia      Subjective:   Admit Date: 8/24/2021    Interval History:   Seen for hyponatremia  Awake and alert this morning  No complaints  Updated by the staff  No new issues  Blood pressure is good  Urine output is not documented      Medications:   Scheduled Meds:   atropine        thiamine  100 mg Oral Daily    folic acid  1 mg Oral Daily    [Held by provider] amLODIPine  5 mg Oral Daily    aspirin  81 mg Oral Daily    atorvastatin  80 mg Oral Nightly    levothyroxine  25 mcg Oral Daily    [Held by provider] lisinopril  10 mg Oral BID    [Held by provider] metoprolol tartrate  25 mg Oral BID    prasugrel  10 mg Oral Daily    tamsulosin  0.4 mg Oral Daily    sodium chloride flush  5-40 mL IntraVENous 2 times per day    enoxaparin  40 mg Subcutaneous Daily    nicotine  1 patch Transdermal Q24H    escitalopram  5 mg Oral Daily    sodium chloride  1 g Oral BID WC     Continuous Infusions:   sodium chloride         CBC:   Recent Labs     08/24/21  0940 08/25/21  0442   WBC 5.4 6.1   HGB 15.6 14.7    127*     CMP:    Recent Labs 08/24/21  0940 08/24/21  1310 08/24/21  1703 08/24/21  2103 08/25/21  0442 08/25/21  0823 08/25/21  2235 08/26/21  0409 08/26/21  1002   *   < > 119*   < > 124*   < > 127* 129* 127*   K 5.5*   < > 5.2  --  4.6  --   --  4.1  --    CL 84*  --   --   --  91*  --   --  96*  --    CO2 22*  --   --   --  19*  --   --  24  --    BUN 11  --   --   --  10  --   --  9  --    CREATININE 0.9  --   --   --  0.7  --   --  0.8  --    GLUCOSE 103  --   --   --  78  --   --  80  --    CALCIUM 9.2  --   --   --  8.5  --   --  8.6  --    LABGLOM 82*  --   --   --  >90  --   --  >90  --     < > = values in this interval not displayed. Troponin: No results for input(s): TROPONINI in the last 72 hours. BNP: No results for input(s): BNP in the last 72 hours. INR: No results for input(s): INR in the last 72 hours. Lipids: No results for input(s): CHOL, LDLDIRECT, TRIG, HDL, AMYLASE, LIPASE in the last 72 hours. Liver:   Recent Labs     08/24/21  0940   AST 37   ALT 46   ALKPHOS 90   PROT 6.6   LABALBU 4.4   BILITOT 0.6     Iron:  No results for input(s): IRONS, FERRITIN in the last 72 hours. Invalid input(s): LABIRONS  CT CHEST W WO CONTRAST   Final Result       1. There is an 8 mm cavitary lesion in the left upper lobe. This is a new finding when compared to the previous CT scan dated 04/03/2017.   2. Ectasia of the ascending aorta which measures 3.9 cm. **This report has been created using voice recognition software. It may contain minor errors which are inherent in voice recognition technology. **      Final report electronically signed by Dr Saniya Torrez on 8/26/2021 9:06 AM      XR CHEST (2 VW)   Final Result   No interval change since previous study dated 12 February 2016, no acute cardiopulmonary disease         **This report has been created using voice recognition software. It may contain minor errors which are inherent in voice recognition technology. **      Final report electronically signed by DR Sarbjit Elizabeth on 8/24/2021 10:36 AM            Objective:   Vitals: /83   Pulse (!) 47   Temp 98.4 °F (36.9 °C) (Oral)   Resp 16   Ht 5' 11\" (1.803 m)   Wt 214 lb 14.4 oz (97.5 kg)   SpO2 97%   BMI 29.97 kg/m²    Wt Readings from Last 3 Encounters:   08/24/21 214 lb 14.4 oz (97.5 kg)   08/03/21 229 lb 8 oz (104.1 kg)   03/11/21 240 lb (108.9 kg)      24HR INTAKE/OUTPUT:      Intake/Output Summary (Last 24 hours) at 8/26/2021 1636  Last data filed at 8/26/2021 1459  Gross per 24 hour   Intake 820 ml   Output 200 ml   Net 620 ml       Constitutional: Well-developed elderly gentleman alert, awake, no apparent distress   Skin:normal with no rash or lesions  HEENT:Pupils are reactive . Throat is clear . Oral mucosa is moist   Neck:supple with no thyromegaly or carotid bruit  Cardiovascular:  S1, S2 without murmur or rubs   Respiratory: Decreased breath sound bilaterally  Abdomen: +bs, soft, no tenderness to palpation and no palpable mass. No abdominal bruit   Ext: No LE edema  Musculoskeletal:Intact  Neuro:Alert and awake. Well oriented  with no focal deficit. Speech is normal      Electronically signed by Marleny Paulino MD on 8/26/2021 at 4:36 PM  **This report has been created using voice recognition software. It maycontain minor  errors which are inherent in voice recognition technology. **

## 2021-08-26 NOTE — PLAN OF CARE
Problem: Falls - Risk of:  Goal: Will remain free from falls  Description: Will remain free from falls  Outcome: Ongoing  Note: Patient remains free from falls this shift. Goal: Absence of physical injury  Description: Absence of physical injury  Outcome: Completed     Problem: Discharge Planning:  Goal: Discharged to appropriate level of care  Description: Discharged to appropriate level of care  Outcome: Ongoing  Note: Patient plans to be discharged to home alone. Care plan reviewed with patient. Patient verbalize understanding of the plan of care and contribute to goal setting.

## 2021-08-26 NOTE — CONSULTS
CONSULTATION NOTE :ID       Patient - Amber Camilo,  Age - 78 y.o.    - 1945      Room Number - 4K-03/003-A   MRN -  [id number]   [facility] # - [de-identified]  Date of Admission -  2021  9:35 AM  Patient's PCP: Jennifer Gray MD     Requesting Physician: Jennifer Gray MD    REASON FOR CONSULTATION   Cavitary 8mm lesion: evaluate for TB  CHIEF COMPLAINT     fatigue  HISTORY OF PRESENT ILLNESS       This is a very pleasant 78 y.o. male who was admitted to the hospital with a chief complaints of generalized weakness. He lives alone, came from home. Continues to smoke and drink. He was found hyponatremia related to the alcohol abuse. He had a CT scan of the chest and was noted an 8mm cavitary lesion hence asked to see patient to make sure he has no TB. He has no hx of TB, no hx of travel or incarceration. He has chronic cough related to smoking, no night sweat, no hemoptysis or weight loss. The lesion is new. He was placed on isolation. PAST MEDICAL  HISTORY       Past Medical History:   Diagnosis Date    [facility] rest [facility]) 2018    Hypertension     Precordial pain 2018    S/P cardiac cath: 2018: 70-75% mid-LAD. LCX OK. RCA 20%. LVEF 454-50%. LVEDP 12 mmHg. 2018: 70-75% mid-LAD. LCX OK. RCA 20%. LVEF 454-50%. LVEDP 12 mmHg. Dr. Elvie Feliciano S/P PTCA: 2018: FFR-guided stenting of mid-LAD Xience 2.75x28 mm, post-dilated to 3.78 mm proximally. 2018: FFR-guided stenting of mid-LAD Xience 2.75x28 mm, post-dilated to 3.78 mm proximally.  Dr. Elvie Feliciano Thyroid disease        PAST SURGICAL HISTORY     Past Surgical History:   Procedure Laterality Date    CARDIAC CATHETERIZATION      CATARACT REMOVAL Bilateral 2017    CATARACT REMOVAL Bilateral     JOINT REPLACEMENT Left 2010    KNEE SURGERY Bilateral     scopes    PTCA           MEDICATIONS:       Scheduled Meds:   atropine        thiamine  100 mg Oral Daily  folic acid  1 mg Oral Daily    [Held by provider] amLODIPine  5 mg Oral Daily    aspirin  81 mg Oral Daily    atorvastatin  80 mg Oral Nightly    levothyroxine  25 mcg Oral Daily    [Held by provider] lisinopril  10 mg Oral BID    [Held by provider] metoprolol tartrate  25 mg Oral BID    prasugrel  10 mg Oral Daily    tamsulosin  0.4 mg Oral Daily    sodium chloride flush  5-40 mL IntraVENous 2 times per day    enoxaparin  40 mg Subcutaneous Daily    nicotine  1 patch Transdermal Q24H    escitalopram  5 mg Oral Daily    sodium chloride  1 g Oral BID WC     Continuous Infusions:   sodium chloride       PRN Meds:atropine, sodium chloride flush, sodium chloride, ondansetron **OR** ondansetron, acetaminophen **OR** acetaminophen, LORazepam **OR** LORazepam **OR** LORazepam **OR** LORazepam **OR** LORazepam **OR** LORazepam **OR** LORazepam **OR** LORazepam  Allergies:   ALLERGIES:    Patient has no known allergies. SOCIAL HISTORY:     TOBACCO:   reports that he has been smoking cigarettes. He has a 37.50 pack-year smoking history. He quit smokeless tobacco use about 7 years ago. ETOH:   reports current alcohol use. Patient currently lives alone      FAMILY HISTORY:         Problem Relation Age of Onset    Cancer Mother         breast    Heart Disease Father        REVIEW OF SYSTEMS:     Constitutional: no fever, no night sweats, + fatigue, no weight loss. Head: no head ache , no head injury, no migranes. Eye: no eye discharge, blurring of vision, no double vision,no eye pain. Ears: no hearing difficulty, no tinnitus. Mouth/throat: no ulceration, dental caries , dysphagia, no hoarseness and voice change  Respiratory: + cough no chest pain,no shortness of breath,no wheezing  CVS: no palpitation, no chest pain,   GI: no abdominal pain, no nausea , no vomiting, no constipation,no diarrhea.   CHIP: no dysuria, frequency and urgency, no hematuria, no kidney stones  Musculoskeletal: no joint pain, swelling , stiffness,  Endocrine: no polyuria, polydipsia, no cold or heat intolerance  Hematology: no anemia, no easy brusing or bleeding, no hx of clotting disorder  Dermatology: no skin rash, no skin lesions, no pruritis,  Neurological:no headaches,no dizziness, no seizure, no numbness. Psychiatry: no depression, no anxiety,no panic attacks, no suicide ideation    PHYSICAL EXAM:     /72   Pulse 69   Temp 98.4 °F (36.9 °C) (Oral)   Resp 18   Ht 5' 11\" (1.803 m)   Wt 214 lb 14.4 oz (97.5 kg)   SpO2 93%   BMI 29.97 kg/m²   General apperance:  Awake, alert, not in distress. HEENT: pink conjunctiva, unicteric sclera, moist oral mucosa. Chest:  Bilateral air entry  Cardiovascular:  RRR ,S1S2, no murmur or gallop. Abdomen:  Soft, non tender to palpation. Extremities: no rash  Skin:  Warm and dry. CNS:oriented to person place and time. LABS:     CBC:   Recent Labs     08/24/21  0940 08/25/21  0442   WBC 5.4 6.1   HGB 15.6 14.7    127*     BMP:    Recent Labs     08/24/21  0940 08/24/21  1310 08/24/21  1703 08/24/21  2103 08/25/21  0442 08/25/21  0823 08/25/21  2235 08/26/21  0409 08/26/21  1002   *   < > 119*   < > 124*   < > 127* 129* 127*   K 5.5*   < > 5.2  --  4.6  --   --  4.1  --    CL 84*  --   --   --  91*  --   --  96*  --    CO2 22*  --   --   --  19*  --   --  24  --    BUN 11  --   --   --  10  --   --  9  --    CREATININE 0.9  --   --   --  0.7  --   --  0.8  --    GLUCOSE 103  --   --   --  78  --   --  80  --     < > = values in this interval not displayed. Calcium:  Recent Labs     08/26/21  0409   CALCIUM 8.6     Ionized Calcium:No results for input(s): IONCA in the last 72 hours.   Magnesium:  Recent Labs     08/24/21  0940   MG 1.8   Hepatic:   Recent Labs     08/24/21  0940   ALKPHOS 90   ALT 46   AST 37   PROT 6.6   BILITOT 0.6   LABALBU 4.4     UA: No results for input(s): [place], COLORU, CLARITYU, MUCUS, PROTEINU, BLOODU, [address],  Phyllis Edwards, BACTERIA, NITRU, GLUCOSEU, BILIRUBINUR, UROBILINOGEN, KETUA, LABCAST, LABCASTTY, AMORPHOS in the last 72 hours. Invalid input(s): CRYSTALS      IMAGING:    Micro: No results found for: [facility]    Problem list of patient      Patient Active Problem List   Diagnosis Code    Precordial pain R07.2    Angina at rest [facility]) I20.8    S/P cardiac cath: 1/25/2018: 70-75% mid-LAD. LCX OK. RCA 20%. LVEF 454-50%. LVEDP 12 mmHg. Z98.890    S/P PTCA: 1/25/2018: FFR-guided stenting of mid-LAD Xience 2.75x28 mm, post-dilated to 3.78 mm proximally. Z98.61    Chronic obstructive pulmonary disease, unspecified (HCC) J44.9    Hyponatremia E87.1           Impression and Recommendation:   Hyponatremia likely related to the alcohol abuse  Cavitary left upper lobe lesion 8mm: no hx of TB,chronic cough related to smoking. He will need further investigation this cavity is not related to underlying malignancy as well  Infection Control:   Airborne isolation until further notice  Discharge Planning (Coordination of out patient care:   home  Thank you Terra Mayen MD for allowing me to participate in this patient's care.     Daniel Paredes MD, MD,FACP 8/26/2021 12:10 PM

## 2021-08-26 NOTE — FLOWSHEET NOTE
Pt was in bed at the time of the visit. He was in isolation room and !-95 mask has to be used in getting to his room. He was encouraged and prayed for. It was well appreciated. 08/26/21 5660   Encounter Summary   Services provided to: Patient   Referral/Consult From: Angelo   Continue Visiting Yes  (8/26 )   Complexity of Encounter Low   Length of Encounter 15 minutes   Routine   Type Initial   Assessment Approachable;Calm   Intervention [place];Prayer;Nurtured hope   Outcome Acceptance;Expressed gratitude;Encouraged; Hopeful;Receptive

## 2021-08-27 VITALS
BODY MASS INDEX: 30.09 KG/M2 | DIASTOLIC BLOOD PRESSURE: 71 MMHG | OXYGEN SATURATION: 96 % | TEMPERATURE: 98.6 F | WEIGHT: 214.9 LBS | RESPIRATION RATE: 16 BRPM | SYSTOLIC BLOOD PRESSURE: 131 MMHG | HEIGHT: 71 IN | HEART RATE: 68 BPM

## 2021-08-27 LAB
AFB CULTURE & SMEAR: NORMAL
ANION GAP SERPL CALCULATED.3IONS-SCNC: 12 MEQ/L (ref 8–16)
BUN BLDV-MCNC: 10 MG/DL (ref 7–22)
CALCIUM SERPL-MCNC: 8.8 MG/DL (ref 8.5–10.5)
CHLORIDE BLD-SCNC: 98 MEQ/L (ref 98–111)
CO2: 21 MEQ/L (ref 23–33)
CREAT SERPL-MCNC: 0.7 MG/DL (ref 0.4–1.2)
GFR SERPL CREATININE-BSD FRML MDRD: > 90 ML/MIN/1.73M2
GLUCOSE BLD-MCNC: 79 MG/DL (ref 70–108)
MAGNESIUM: 2.1 MG/DL (ref 1.6–2.4)
PHOSPHORUS: 4.1 MG/DL (ref 2.4–4.7)
POTASSIUM REFLEX MAGNESIUM: 4.3 MEQ/L (ref 3.5–5.2)
REASON FOR REJECTION: NORMAL
REJECTED TEST: NORMAL
SODIUM BLD-SCNC: 131 MEQ/L (ref 135–145)

## 2021-08-27 PROCEDURE — 80048 BASIC METABOLIC PNL TOTAL CA: CPT

## 2021-08-27 PROCEDURE — 6370000000 HC RX 637 (ALT 250 FOR IP): Performed by: INTERNAL MEDICINE

## 2021-08-27 PROCEDURE — 83735 ASSAY OF MAGNESIUM: CPT

## 2021-08-27 PROCEDURE — 99232 SBSQ HOSP IP/OBS MODERATE 35: CPT | Performed by: INTERNAL MEDICINE

## 2021-08-27 PROCEDURE — 87206 SMEAR FLUORESCENT/ACID STAI: CPT

## 2021-08-27 PROCEDURE — 6360000002 HC RX W HCPCS: Performed by: REGISTERED NURSE

## 2021-08-27 PROCEDURE — 84100 ASSAY OF PHOSPHORUS: CPT

## 2021-08-27 PROCEDURE — 36415 COLL VENOUS BLD VENIPUNCTURE: CPT

## 2021-08-27 RX ORDER — FOLIC ACID 1 MG/1
1 TABLET ORAL DAILY
Qty: 30 TABLET | Refills: 3 | Status: SHIPPED | OUTPATIENT
Start: 2021-08-28

## 2021-08-27 RX ORDER — LANOLIN ALCOHOL/MO/W.PET/CERES
100 CREAM (GRAM) TOPICAL DAILY
Qty: 30 TABLET | Refills: 3 | Status: SHIPPED | OUTPATIENT
Start: 2021-08-28 | End: 2022-03-10 | Stop reason: SDUPTHER

## 2021-08-27 RX ORDER — SODIUM CHLORIDE 1000 MG
1 TABLET, SOLUBLE MISCELLANEOUS 2 TIMES DAILY WITH MEALS
Qty: 90 TABLET | Refills: 3 | Status: SHIPPED | OUTPATIENT
Start: 2021-08-27

## 2021-08-27 RX ADMIN — ENOXAPARIN SODIUM 40 MG: 40 INJECTION SUBCUTANEOUS at 08:43

## 2021-08-27 RX ADMIN — FOLIC ACID 1 MG: 1 TABLET ORAL at 08:43

## 2021-08-27 RX ADMIN — LEVOTHYROXINE SODIUM 25 MCG: 0.03 TABLET ORAL at 05:27

## 2021-08-27 RX ADMIN — Medication 1 G: at 08:43

## 2021-08-27 RX ADMIN — TAMSULOSIN HYDROCHLORIDE 0.4 MG: 0.4 CAPSULE ORAL at 08:43

## 2021-08-27 RX ADMIN — ASPIRIN 81 MG: 81 TABLET, CHEWABLE ORAL at 08:43

## 2021-08-27 RX ADMIN — Medication 100 MG: at 08:43

## 2021-08-27 RX ADMIN — ESCITALOPRAM 5 MG: 10 TABLET, FILM COATED ORAL at 08:43

## 2021-08-27 RX ADMIN — PRASUGREL 10 MG: 10 TABLET, FILM COATED ORAL at 08:43

## 2021-08-27 ASSESSMENT — PAIN SCALES - GENERAL: PAINLEVEL_OUTOF10: 0

## 2021-08-27 NOTE — CARE COORDINATION
8/27/21, 12:30 PM EDT  Plans home alone when medically cleared (await ID clearance)  Patient goals/plan/ treatment preferences discussed by  and . Patient goals/plan/ treatment preferences reviewed with patient/ family. Patient/ family verbalize understanding of discharge plan and are in agreement with goal/plan/treatment preferences. Understanding was demonstrated using the teach back method. AVS provided by RN at time of discharge, which includes all necessary medical information pertaining to the patients current course of illness, treatment, post-discharge goals of care, and treatment preferences.         IMM Letter  IMM Letter given to Patient/Family/Significant other/Guardian/POA/by[de-identified]   IMM Letter date given[de-identified] 08/27/21  IMM Letter time given[de-identified] 2056

## 2021-08-27 NOTE — DISCHARGE SUMMARY
BID    [Held by provider] metoprolol tartrate  25 mg Oral BID    prasugrel  10 mg Oral Daily    tamsulosin  0.4 mg Oral Daily    sodium chloride flush  5-40 mL IntraVENous 2 times per day    enoxaparin  40 mg Subcutaneous Daily    nicotine  1 patch Transdermal Q24H    escitalopram  5 mg Oral Daily    sodium chloride  1 g Oral BID WC     Continuous Infusions:   sodium chloride       PRN Meds:.ipratropium-albuterol, sodium chloride flush, sodium chloride, ondansetron **OR** ondansetron, acetaminophen **OR** acetaminophen, LORazepam **OR** LORazepam **OR** LORazepam **OR** LORazepam **OR** LORazepam **OR** LORazepam **OR** LORazepam **OR** LORazepam  Continuous Infusions:   sodium chloride         Intake/Output Summary (Last 24 hours) at 8/27/2021 1534  Last data filed at 8/26/2021 1930  Gross per 24 hour   Intake --   Output 300 ml   Net -300 ml       Diet:  ADULT DIET;  Regular    Activity:  Activity as tolerated (Patient may move about with assist as indicated or with supervision.)    Follow-up:  in the next few weeks with Margarita Otero MD,     Consultants:  Nephrology, Cardiology, ID      Objective:  Lab Results   Component Value Date    WBC 6.1 08/25/2021    RBC 4.22 08/25/2021    RBC 0 06/08/2021    RBC 4.73 06/08/2021    HGB 14.7 08/25/2021    HCT 41.5 08/25/2021    MCV 98.3 08/25/2021    MCH 34.8 08/25/2021    MCHC 35.4 08/25/2021    RDW 13.4 06/08/2021     08/25/2021    MPV 9.7 08/25/2021     Lab Results   Component Value Date     08/27/2021    K 4.3 08/27/2021    CL 98 08/27/2021    CO2 21 08/27/2021    BUN 10 08/27/2021    CREATININE 0.7 08/27/2021    GLUCOSE 79 08/27/2021    GLUCOSE 117 08/23/2021    CALCIUM 8.8 08/27/2021     Lab Results   Component Value Date    CALCIUM 8.8 08/27/2021     No results found for: IONCA  Lab Results   Component Value Date    MG 2.1 08/27/2021     Lab Results   Component Value Date    PHOS 4.1 08/27/2021     No results found for: BNP  Lab Results Component Value Date    ALKPHOS 90 08/24/2021    ALT 46 08/24/2021    AST 37 08/24/2021    PROT 6.6 08/24/2021    BILITOT 0.6 08/24/2021    BILITOT Negative 06/08/2021    LABALBU 4.4 08/24/2021     No results found for: LACTA  No results found for: AMYLASE  No results found for: LIPASE  Lab Results   Component Value Date    CHOL 117 06/08/2021    CHOL 109 05/29/2018    TRIG 85 06/08/2021    HDL 61 06/08/2021    LDLCALC 39 06/08/2021     No results for input(s): POCGLU in the last 72 hours. No results for input(s): CKTOTAL, CKMB, TROPONINI in the last 72 hours. Lab Results   Component Value Date    LABA1C 5.3 02/21/2020     Lab Results   Component Value Date    INR 1.00 01/26/2018     No results found for: PHART, PO2ART, [id number], [id number], [facility] Course: clinical course has improved    Initial H+P: The patient is a 78 y.o. male with pmhx of CHF preserved EF, LAD s/p PCI 8883, BPH, umbilical hernia, htn, hld, alcohol abuse, tobacco abuse who presents with abnormal labs outpt. Pt was noted to have Na 118 yesterday outpt and was advised to come into the ED. Pt was recently started on Spirnolactone at the beginning of this month, which was his only medication change. Pt denies excessive water intake. He does drink 7-8 beers per day and smokes 2ppd. Pt has no complaints other than chronic fatigue. He lives independently and is able to function independently with the use of occasional cane. Daughter at bedside today. Pt denies confusion, chest pain, SOB, fever, chills, falls. In the emergency department Na 117, K+ 5.5, CO2 22, , CXR negative. Pt has received both of his COVID vaccines. Patient admitted for hyponatremia. Pt is a full code    Pt was maintained on CIWA scale. Nephrology consulted for hyponatremia and this did improve with IVF. Lisinopril and spirinolactone on hold due to hyperkalemia. Pt was noted to have HR down to the 20s and hence cardiology consulted and BB held.  Transcutaneous pacer pads in place and Atropine ordered but pt refused. Cardiology recommended pacemaker but pt refused. An 8mm cavitary lesion was seen on CT and TB was suspected and hence ID was consulted; did not believe this to be due to TB, sputum culture sent. Pt declined bronch by pulmonary. Will follow up outpt. Pt stable for discharge; BB, Lisinopril and Spirinolactone to be continued to be held; ok for Norvasc to be continued per cardiology.         Vitals: /71   Pulse 68   Temp 98.6 °F (37 °C) (Oral)   Resp 16   Ht 5' 11\" (1.803 m)   Wt 214 lb 14.4 oz (97.5 kg)   SpO2 96%   BMI 29.97 kg/m²         Disposition: home    Condition: Stable    Over 35 minutes spent on encounter    Assessment and plan of care discussed with supervising physician, Dr Jo Bhatia, APRN - CNP, CNP     Copy: Primary Care Physician: Itzel Green MD  Internal Medicine

## 2021-08-27 NOTE — PROGRESS NOTES
[facility]      Patient - Juan M Martins   MRN -  [id number]   [facility]   - 1945      Date of Admission -  2021  9:35 AM  Date of evaluation -  2021  Room - --A   Hospital Day - 800 MARCIA Navarro MD Primary Care Physician - Doc Osei MD     Problem List      Active Hospital Problems    Diagnosis Date Noted    Fatigue [R53.83]     Hyponatremia [E87.1] 2021     Reason for Consult    8mm cavity lesion in left upper lobe    HPI   History Obtained From: Patient and electronic medical record. Juan M Martins is a 78 y.o. male who presented to the hospital due to hyponatremia. Past medical history significant with alcoholic abuse, tobacco abuse CHF with EF 45-50%. Hospital day 3 pulmonologist will consult for new finding 8 mm cavitary lesion on the left upper lobe. Patient had no history of TB,  incarceration, travel. Upon visiting patient report chronic smoker for 25 years with 2 packs a day. Reports of chronic smoker's cough for 2 years. Denies any cough up blood, fever, night sweats, weight loss. Patient reports that he had pulmonary function tests ordered 2 years ago however he did not want to see more doctor thus he hasn't had it done    He is having shortness of breath: Yes  Onset: gradual   Duration: many years. Diurnal variation:  None. Worse in the morning and in the middle of the day  Functional status prior to beginning of symptoms: 0 block/s on level ground. Current functional capacity on level ground: 0 block/s on level ground. He can climb steps: Yes  Flights of steps she can climb: 10  He reports orthopnea. He reports paroxysmal nocturnal dyspnea. He is having cough: Yes  Duration of cough: for 2 years  His cough is associated with sputum production: Yes   The sputum color: white most of the time. Yellow, green sometime  Hemoptysis:No  Diurnal variation: None. Chest pain up to max of 3 total doses. If no relief after 1 dose, call 911. metoprolol tartrate (LOPRESSOR) 25 MG tablet, TAKE 1 TABLET BY MOUTH TWO TIMES A DAY  aspirin 81 MG chewable tablet, Take 1 tablet by mouth daily  levothyroxine (SYNTHROID) 25 MCG tablet, Take 25 mcg by mouth Daily  tamsulosin (FLOMAX) 0.4 MG capsule, Take 0.4 mg by mouth daily  Compression Stockings MISC, by Does not apply route  Diet    ADULT DIET; Regular  Allergies    Patient has no known allergies. Social History     Social History     Socioeconomic History    Marital status:      Spouse name: Not on file    Number of children: Not on file    Years of education: Not on file    Highest education level: Not on file   Occupational History    Not on file   Tobacco Use    Smoking status: Current Every Day Smoker     Packs/day: 1.50     Years: 25.00     Pack years: 37.50     Types: Cigarettes    Smokeless tobacco: Former User     Quit date: 2014   Vaping Use    Vaping Use: Never used   Substance and Sexual Activity    Alcohol use: Yes     Comment: 6-10 beers daily    Drug use: No    Sexual activity: Never   Other Topics Concern    Not on file   Social History Narrative    Not on file     Social Determinants of Health     Financial Resource Strain:     Difficulty of Paying Living Expenses:    Food Insecurity:     Worried About [facility] in the Last Year:     [facility] in the Last Year:    Transportation Needs:     Lack of Transportation (Medical):      Lack of Transportation (Non-Medical):    Physical Activity:     Days of Exercise per Week:     Minutes of Exercise per Session:    Stress:     Feeling of Stress :    Social Connections:     Frequency of Communication with Friends and Family:     Frequency of Social Gatherings with Friends and Family:     Attends Cheondo[employer] Services:     Active Member of Clubs or Organizations:     Attends Club or Organization Meetings:     Marital Status: Intimate Partner Violence:     Fear of Current or Ex-Partner:     Emotionally Abused:     Physically Abused:     Sexually Abused:      Family History          Problem Relation Age of Onset    Cancer Mother         breast    Heart Disease Father      Sleep History    Never diagnosed with sleep apnea in the past.  Occupational history   Occupation:  He is current working: No  Type of profession: retired. History of tobacco smoking:Yes  Amount of tobacco smokin.0 PPD. Years of tobacco smokin.                                    Quit smoking: No.                . History of recreational or IV drug use in the past:NO     History of exposure to coal mines/coal dust: NO  History of exposure to foundry dust/welding: NO  History of exposure to quarry/silica/sandblasting: NO  History of exposure to asbestos/working with breaks/ships: NO  History of exposure to farm dust: NO  History of recent travel to long distances: NO  History of exposure to birds, pigeons, or chickens in the past:NO  Pet animals at home:Yes  Dogs: 2  Cats: 1    History of pulmonary embolism in the past: No            History of DVT in the past:Yes        [] Right lower extremity   [] Left lower extremity. Review of systems     Constitutional: Positive for activity change and fatigue. Negative for appetite change, chills, diaphoresis, fever and unexpected weight change. HENT: Negative for congestion, dental problem, ear discharge, mouth sores, nosebleeds, sinus pain, tinnitus and trouble swallowing. Respiratory: Positive for apnea, cough and shortness of breath. Negative for choking, chest tightness, wheezing and stridor. Cardiovascular: Positive for leg swelling. Negative for chest pain and palpitations. Gastrointestinal: Positive for abdominal distention. Negative for abdominal pain, anal bleeding, blood in stool, constipation, diarrhea, nausea and vomiting.    Genitourinary: Negative for dysuria, flank pain, hematuria and urgency. Musculoskeletal: Positive for myalgias. Negative for back pain, gait problem, neck pain and neck stiffness. Skin: Negative for color change, pallor, rash and wound. Neurological: Positive for weakness and light-headedness. Negative for dizziness, tremors, seizures, syncope, speech difficulty, numbness and headaches. Hematological: Negative for adenopathy. Does not bruise/bleed easily. Psychiatric/Behavioral: Negative for agitation, behavioral problems, confusion, decreased concentration, dysphoric mood and hallucinations. Vitals     height is 5' 11\" (1.803 m) and weight is 214 lb 14.4 oz (97.5 kg). His oral temperature is 98.6 °F (37 °C). His blood pressure is 131/71 and his pulse is 68. His respiration is 16 and oxygen saturation is 96%. Body mass index is 29.97 kg/m². SUPPLEMENTAL O2:       I/O        Intake/Output Summary (Last 24 hours) at 8/27/2021 1230  Last data filed at 8/26/2021 1930  Gross per 24 hour   Intake 0 ml   Output 300 ml   Net -300 ml     I/O last 3 completed shifts: In: 420 [P.O.:420]  Out: 300 [Urine:300]   Patient Vitals for the past 96 hrs (Last 3 readings):   Weight   08/24/21 2015 214 lb 14.4 oz (97.5 kg)   08/24/21 1546 229 lb 8 oz (104.1 kg)       Exam   Nursing note and vitals reviewed. Constitutional:  General: He is not in acute distress. Appearance: Normal appearance. He is normal weight. He is not ill-appearing, toxic-appearing or diaphoretic. HENT: Head: Normocephalic and atraumatic. Mouth: Mucous membranes are moist.      Pharynx: Oropharynx is clear. No oropharyngeal exudate or posterior oropharyngeal erythema. Neck:      Vascular: No carotid bruit. Cardiovascular:      Rate and Rhythm: Normal rate and regular rhythm. Pulses: Normal pulses. Heart sounds: Normal heart sounds. No murmur heard. No friction rub. No gallop. Pulmonary:      Effort: No respiratory distress. Breath sounds:  No stridor, rales, wheezing or rhonchi. Chest:      Chest wall: No tenderness. Abdominal:      General: Abdomen is flat. Bowel sounds are normal. There is no distension. Palpations: Abdomen is soft. There is no mass. Tenderness: There is no abdominal tenderness. There is no right CVA tenderness, left CVA tenderness, guarding or rebound. Hernia: No hernia is present. Musculoskeletal:         General: No swelling, tenderness, deformity or signs of injury. Normal range of motion. Cervical back: Normal range of motion and neck supple. No rigidity or tenderness. Right lower leg: No edema. Left lower leg: No edema. Lymphadenopathy:      Cervical: No cervical adenopathy. Labs  - Old records and notes have been reviewed in [facility]   ABG  Lab Results   Component Value Date    PH 6.0 06/08/2021     No results found for: IFIO2, MODE, SETTIDVOL, SETPEEP  CBC  Recent Labs     08/25/21 0442   WBC 6.1   RBC 4.22*   HGB 14.7   HCT 41.5*   MCV 98.3*   MCH 34.8*   MCHC 35.4   *   MPV 9.7      BMP  Recent Labs     08/25/21  0442 08/25/21  0823 08/26/21  0409 08/26/21  1002 08/27/21  0526   *   < > 129* 127* 131*   K 4.6  --  4.1  --  4.3   CL 91*  --  96*  --  98   CO2 19*  --  24  --  21*   BUN 10  --  9  --  10   CREATININE 0.7  --  0.8  --  0.7   GLUCOSE 78  --  80  --  79   MG  --   --   --   --  2.1   PHOS  --   --   --   --  4.1   CALCIUM 8.5  --  8.6  --  8.8    < > = values in this interval not displayed. LFT  No results for input(s): AST, ALT, ALB, BILITOT, ALKPHOS, LIPASE in the last 72 hours. Invalid input(s): AMYLASE  TROP  Lab Results   Component Value Date    TROPONINT < 0.010 08/24/2021     BNP  No results for input(s): BNP in the last 72 hours. Lactic Acid  No results for input(s): LACTA in the last 72 hours. INR  No results for input(s): INR, PROTIME in the last 72 hours. PTT  No results for input(s): APTT in the last 72 hours.   Glucose  No results for input(s): POCGLU in the last 72 hours. UA No results for input(s): SPECGRAV, PHUR, COLORU, CLARITYU, MUCUS, PROTEINU, BLOODU, RBCUA, WBCUA, BACTERIA, NITRU, GLUCOSEU, BILIRUBINUR, UROBILINOGEN, KETUA, LABCAST, LABCASTTY, AMORPHOS in the last 72 hours. Invalid input(s): CRYSTALS. PFTs   none    Sleep studies   none    Cultures    Pending Acid Fast Bacillius, AFB stain  (-) VRE, MRSA    EKG   8/24/21      Echocardiogram   3/21  LV EF 55-60%. Normal left ventricular size and systolic function. There were no regional wall motion abnormalities. Wall thickness was within normal limits. LV Doppler parameters were consistent with abnormal relaxation (grade 1 diastolic dysfunction). IVC size is within normal limits with normal respiratory phasic changes    Radiology    CXR 8/24/24  No interval change since previous study dated 12 February 2016, no acute cardiopulmonary disease         CT Chest 8/26/21  1. There is an 8 mm cavitary lesion in the left upper lobe. This is a new finding when compared to the previous CT scan dated 04/03/2017.   2. Ectasia of the ascending aorta which measures 3.9 cm. Assessment   Several small cavitary lesions c/w pneumatoceles, suspect atypical mycobacterial disease, doubt Tuberculosis, other possibilities include septic emboli, malignancy, aspiration, vasculitic/inflamatory  processes,   Smoker    Plan         Heavy smoker with chronic bronchitis and very likely un quantified  COPD  Several small cavitary lesions c/w pneumatoceles, suspect atypical mycobacterial disease, doubt Tuberculosis, other possibilities include septic emboli, malignancy, aspiration, vasculitic/inflamatory  processes,             Proper w/u initiated by ID service, Chandni Riojas declines bronchoscopy or pacemaker, wagreeable to follow in OP office, of note I saw Chandni Riojas in [employer] office 2 years ago and did not follow through with w/u.   Will see in office 6 weeks with f/u CT chest.    Electronically signed by

## 2021-08-27 NOTE — PROGRESS NOTES
Renal Progress Note    Assessment and Plan:   1. Hyponatremia much improved likely from alcohol misuse still possibility of lung pathology cannot be ruled out  2. Alcohol misuse  3. Left upper lobe lung lesion of unclear significant  4. Metabolic acidosis  5. Thrombocytopenia    PLAN:  1. I discussed my thoughts with the patient. 2. He understood. 3.  I addressed his questions. 4. CT of the lung reported reviewed and results  discussed with him. 5. CT was done because of his long history of heavy tobacco use and hyponatremia which for all practical purposes may be alcohol related. 6. Infectious disease note well appreciated  7. Await pulmonary consult   8. We will follow    Patient Active Problem List:     Precordial pain     Angina at rest [facility])     S/P cardiac cath: 1/25/2018: 70-75% mid-LAD. LCX OK. RCA 20%. LVEF 454-50%. LVEDP 12 mmHg. S/P PTCA: 1/25/2018: FFR-guided stenting of mid-LAD Xience 2.75x28 mm, post-dilated to 3.78 mm proximally. Chronic obstructive pulmonary disease, unspecified (HCC)     Hyponatremia     Fatigue      Subjective:   Admit Date: 8/24/2021    Interval History:   Seen for hyponatremia. Awake and alert. No complaint. Wanting to go home however. Patient's discharge was delayed yesterday due to pulmonary findings. Updated by the staff.   No issues  Blood pressure is good  Urine output is not completely documented          Medications:   Scheduled Meds:   thiamine  100 mg Oral Daily    folic acid  1 mg Oral Daily    [Held by provider] amLODIPine  5 mg Oral Daily    aspirin  81 mg Oral Daily    atorvastatin  80 mg Oral Nightly    levothyroxine  25 mcg Oral Daily    [Held by provider] lisinopril  10 mg Oral BID    [Held by provider] metoprolol tartrate  25 mg Oral BID    prasugrel  10 mg Oral Daily    tamsulosin  0.4 mg Oral Daily    sodium chloride flush  5-40 mL IntraVENous 2 times per day    enoxaparin  40 mg Subcutaneous Daily    nicotine  1 patch signed by DR Chad Davies on 8/24/2021 10:36 AM      CT CHEST WO CONTRAST    (Results Pending)         Objective:   Vitals: /71   Pulse 68   Temp 98.6 °F (37 °C) (Oral)   Resp 16   Ht 5' 11\" (1.803 m)   Wt 214 lb 14.4 oz (97.5 kg)   SpO2 96%   BMI 29.97 kg/m²    Wt Readings from Last 3 Encounters:   08/24/21 214 lb 14.4 oz (97.5 kg)   08/03/21 229 lb 8 oz (104.1 kg)   03/11/21 240 lb (108.9 kg)      24HR INTAKE/OUTPUT:      Intake/Output Summary (Last 24 hours) at 8/27/2021 1221  Last data filed at 8/26/2021 1930  Gross per 24 hour   Intake 0 ml   Output 300 ml   Net -300 ml       Constitutional: Well-developed elderly gentleman alert, awake, no apparent distress   Skin:normal with no rash or lesions. HEENT:Pupils are reactive . Throat is clear . Oral mucosa is moist   Neck:supple with no thyromegaly or carotid bruit  Cardiovascular:  S1, S2 without murmur or rubs   Respiratory:  Clear to ausculation without wheezes, rhonchi or rales. Abdomen: +bs, soft, no tenderness to palpation and no palpable mass. No abdominal bruit   Ext: No LE edema  Musculoskeletal:Intact  Neuro:Alert and awake. Well oriented  with no focal deficit. Speech is normal      Electronically signed by Annie Handley MD on 8/27/2021 at 12:21 PM  **This report has been created using voice recognition software. It maycontain minor  errors which are inherent in voice recognition technology. **

## 2021-08-27 NOTE — PROGRESS NOTES
Progress note: Infectious diseases    Patient - Marley Sabillon,  Age - 78 y.o.    - 1945      Room Number - 4K-03/003-A   MRN -  [id number]   Acct # - [de-identified]  Date of Admission -  2021  9:35 AM    SUBJECTIVE:   He has no new complaints  He wants to go home  He lives alone   OBJECTIVE   VITALS    height is 5' 11\" (1.803 m) and weight is 214 lb 14.4 oz (97.5 kg). His oral temperature is 98.6 °F (37 °C). His blood pressure is 131/71 and his pulse is 68. His respiration is 16 and oxygen saturation is 96%.        Wt Readings from Last 3 Encounters:   21 214 lb 14.4 oz (97.5 kg)   21 229 lb 8 oz (104.1 kg)   21 240 lb (108.9 kg)       I/O (24 Hours)    Intake/Output Summary (Last 24 hours) at 2021 1452  Last data filed at 2021 1930  Gross per 24 hour   Intake 0 ml   Output 300 ml   Net -300 ml       General Appearance  Awake, alert, oriented,  not  In acute distress  HEENT - normocephalic, atraumatic, pink conjunctiva,  anicteric sclera  Neck - Supple, no mass  Lungs -  Bilateral air entry, diminished breath sound  Cardiovascular - Heart sounds are normal.    Abdomen - soft, not distended, nontender,   Neurologic -oriented  Skin - + bruising or bleeding  Extremities - No edema, no cyanosis, clubbing     MEDICATIONS:      thiamine  100 mg Oral Daily    folic acid  1 mg Oral Daily    [Held by provider] amLODIPine  5 mg Oral Daily    aspirin  81 mg Oral Daily    atorvastatin  80 mg Oral Nightly    levothyroxine  25 mcg Oral Daily    [Held by provider] lisinopril  10 mg Oral BID    [Held by provider] metoprolol tartrate  25 mg Oral BID    prasugrel  10 mg Oral Daily    tamsulosin  0.4 mg Oral Daily    sodium chloride flush  5-40 mL IntraVENous 2 times per day    enoxaparin  40 mg Subcutaneous Daily    nicotine  1 patch Transdermal Q24H    escitalopram  5 mg Oral Daily    sodium chloride  1 g Oral BID WC      sodium chloride       ipratropium-albuterol, sodium chloride flush, sodium chloride, ondansetron **OR** ondansetron, acetaminophen **OR** acetaminophen, LORazepam **OR** LORazepam **OR** LORazepam **OR** LORazepam **OR** LORazepam **OR** LORazepam **OR** LORazepam **OR** LORazepam      LABS:     CBC:   Recent Labs     08/25/21 0442   WBC 6.1   HGB 14.7   *     BMP:    Recent Labs     08/25/21  0442 08/25/21  0823 08/26/21  0409 08/26/21  1002 08/27/21  0526   *   < > 129* 127* 131*   K 4.6  --  4.1  --  4.3   CL 91*  --  96*  --  98   CO2 19*  --  24  --  21*   BUN 10  --  9  --  10   CREATININE 0.7  --  0.8  --  0.7   GLUCOSE 78  --  80  --  79    < > = values in this interval not displayed. Calcium:  Recent Labs     08/27/21  0526   CALCIUM 8.8     Ionized Calcium:No results for input(s): IONCA in the last 72 hours. Magnesium:  Recent Labs     08/27/21  0526   MG 2.1     Phosphorus:  Recent Labs     08/27/21  0526   PHOS 4.1        CULTURES:   UA: No results for input(s): SPECGRAV, PHUR, COLORU, CLARITYU, MUCUS, PROTEINU, BLOODU, RBCUA, WBCUA, BACTERIA, NITRU, GLUCOSEU, BILIRUBINUR, UROBILINOGEN, KETUA, LABCAST, LABCASTTY, AMORPHOS in the last 72 hours. Invalid input(s): CRYSTALS  Micro: No results found for: [facility]       Problem list of patient:     Patient Active Problem List   Diagnosis Code    Precordial pain R07.2    Angina at rest [facility]) I20.8    S/P cardiac cath: 1/25/2018: 70-75% mid-LAD. LCX OK. RCA 20%. LVEF 454-50%. LVEDP 12 mmHg. Z98.890    S/P PTCA: 1/25/2018: FFR-guided stenting of mid-LAD Xience 2.75x28 mm, post-dilated to 3.78 mm proximally.  Z98.61    Chronic obstructive pulmonary disease, unspecified ([address] J44.9    Hyponatremia E87.1    Fatigue R53.83    Smoker unmotivated to quit F17.200    Cavitary lesion of lung J98.4         ASSESSMENT/PLAN   Cavitary lung lesion: will have follow up with pulmonary  Sputum sent  Hx doesn't appear to be due to TB  Will discharge home.  Advised to be confined at home until we get preliminary report  Gary Mar to send him home today        Daniel Paredes MD, MD, [address] 8/27/2021 2:52 PM

## 2021-08-27 NOTE — PROGRESS NOTES
not displayed. Hepatic:   No results for input(s): AST, ALT, ALB, BILITOT, ALKPHOS in the last 72 hours. Troponin: No results for input(s): TROPONINI in the last 72 hours. BNP: No results for input(s): BNP in the last 72 hours. Lipids: No results for input(s): CHOL, HDL in the last 72 hours. Invalid input(s): LDLCALCU  INR: No results for input(s): INR in the last 72 hours.     Radiology    Objective:   Vitals: /71   Pulse 68   Temp 98.6 °F (37 °C) (Oral)   Resp 16   Ht 5' 11\" (1.803 m)   Wt 214 lb 14.4 oz (97.5 kg)   SpO2 96%   BMI 29.97 kg/m²   HEENT: Head:pupils react  Neck: supple  Lungs: clear to auscultation  Heart: regular rhythm  zane now  Abdomen: soft BS heard   Extremities: warm no edema  Neurologic:  Alert, oriented X3    Impression:   :   Bradycardia with type 1 2nd degree block  Hyponatremia  Hyperkalemia  Alcohol abuse  CAD  CHF  Hyperlipidemia  Hypothyroidism  Nicotine use  8mm cavitary lesion        Plan:    HR improved with B Blockers on hold  Will check with ID if pt ok for discharge  noted Na improved       Margarita Otero MD, MD

## 2021-08-28 LAB
AFB SMEAR: NORMAL

## 2021-08-29 LAB
QUANTI TB GOLD PLUS: NEGATIVE
QUANTI TB1 MINUS NIL: 0 IU/ML (ref 0–0.34)
QUANTI TB2 MINUS NIL: 0 IU/ML (ref 0–0.34)
QUANTIFERON MITOGEN MINUS NIL: 3.92 IU/ML
QUANTIFERON NIL: 0.02 IU/ML

## 2021-08-30 ENCOUNTER — TELEPHONE (OUTPATIENT)
Dept: CARDIOLOGY CLINIC | Age: 76
End: 2021-08-30

## 2021-08-30 ENCOUNTER — CARE COORDINATION (OUTPATIENT)
Dept: CASE MANAGEMENT | Age: 76
End: 2021-08-30

## 2021-08-30 DIAGNOSIS — I20.8 ANGINA AT REST (HCC): Primary | ICD-10-CM

## 2021-08-30 PROCEDURE — 1111F DSCHRG MED/CURRENT MED MERGE: CPT | Performed by: INTERNAL MEDICINE

## 2021-08-30 NOTE — CARE COORDINATION
[facility] Transitions Initial Follow Up Call    Call within 2 business days of discharge: Yes    Patient: Cate Medina Patient : 1945   MRN: [id number]  Reason for Admission:  Fatigue  Discharge Date: 21 RARS: Readmission Risk Score: 14      Last Discharge [facility]       Complaint Diagnosis Description Type Department Provider    21 Other; Fatigue Fatigue, unspecified type . .. ED to Hosp-Admission (Discharged) (ADMITTED) Charls Goldberg, MD; Cleopatra Beckett. .. Spoke with: Bard Love states he is doing well, just anxiously awaiting results from TB test.  Pt and I reviewed sign / symptoms of low heart rate. He states he feels fine. He does not know how to check his pulse, does not have a pulse ox and does not have a smart watch. \"I don't even have a computer\". Pt has appt with Cardiology on Wednesday. Daughter will take him. Otherwise he feels fine at home. Denies shortness of breath or weakness. We reviewed medications. Transitions of Care Initial Call    Was this an external facility discharge? No [facility]      Challenges to be reviewed by the provider   Additional needs identified to be addressed with provider: No  none             Method of communication with provider : none      Advance Care Planning:   Does patient have an Advance Directive: reviewed and current. Was this a readmission? No  Patient stated reason for admission:    Patients top risk factors for readmission: ineffective coping, lack of knowledge about disease, medical condition-low hear rate and PCP relationship    Care Transition Nurse (CTN) contacted the patient by telephone to perform post hospital discharge assessment. Verified name and  with patient as identifiers. Provided introduction to self, and explanation of the CTN role. CTN reviewed discharge instructions, medical action plan and red flags with patient who verbalized understanding.  Patient given an opportunity to ask questions and does not have any further questions or concerns at this time. Were discharge instructions available to patient? Yes. Reviewed appropriate site of care based on symptoms and resources available to patient including: PCP, Specialist and When to call 911. The patient agrees to contact the PCP office for questions related to their healthcare. Medication reconciliation was performed with patient, who verbalizes understanding of administration of home medications. Advised obtaining a 90-day supply of all daily and as-needed medications. Covid Risk Education     Educated patient about risk for severe COVID-19 due to risk factors according to CDC guidelines. CTN reviewed discharge instructions, medical action plan and red flag symptoms with the patient who verbalized understanding. Discussed COVID vaccination status: Yes. Education provided on COVID-19 vaccination as appropriate. Discussed exposure protocols and quarantine with CDC Guidelines. Patient was given an opportunity to verbalize any questions and concerns and agrees to contact CTN or health care provider for questions related to their healthcare. Reviewed and educated patient on any new and changed medications related to discharge diagnosis. Was patient discharged with a pulse oximeter? No Discussed and confirmed pulse oximeter discharge instructions and when to notify provider or seek emergency care. CTN provided contact information. Plan for follow-up call in 3-5 days based on severity of symptoms and risk factors. Plan for next call: symptom management-weakness, fatigue, short of breath?, self management-monitoring heart rate and follow up appointment-Cardiology 9/1/2021      1 Follow up with Tristan Park; As needed  2 Follow up with Jennifer Gray MD (Internal Medicine) on 9/3/2021;  Your appointment time is at 10:15 am , Arrive 15 minutes early, please bring photo ID and medications  3 Follow up with Paige Loco MD (Pulmonary Disease); please call offic on Monday  for an appointment in 6 weeks, with a Chest CT prior  CT order is placed  4 Follow up with Sharif Infante MD (Cardiology); please call office on Monday to follow for possible pacemaker placement      Non-face-to-face services provided:  Obtained and reviewed discharge summary and/or continuity of care documents  Reviewed and followed up on pending diagnostic tests and treatments-TB test....  I did not give results to patient    Care Transitions 24 Hour Call    Schedule Follow Up Appointment with PCP: Completed  Do you have any ongoing symptoms?: No  Do you have a copy of your discharge instructions?: Yes  Do you have all of your prescriptions and are they filled?: Yes  Have you been contacted by a [facility] Pharmacist?: No  Have you scheduled your follow up appointment?: Yes  How are you going to get to your appointment?: Car - family or friend to transport  Were you discharged with any Home Care or Post Acute Services: No  Do you feel like you have everything you need to keep you well at home?: Yes  Care Transitions Interventions         Follow Up  Future Appointments   Date Time Provider Cecilia Dan   9/1/2021  2:00 PM Robbie Lopez MD [address] [facility]   10/5/2021 10:30 AM RANJAN Jensen - CNP N [facility] CHF JULIA - Kirstie Young RN

## 2021-08-30 NOTE — TELEPHONE ENCOUNTER
Please schedule for patient to come in within the next 2 weeks with the CHF clinic or Cardiology.      Thank you

## 2021-08-31 ENCOUNTER — TELEPHONE (OUTPATIENT)
Dept: INFECTIOUS DISEASES | Age: 76
End: 2021-08-31

## 2021-08-31 NOTE — TELEPHONE ENCOUNTER
I spoke with patient to inform him of the result of the AFB and quantiferon test.the test was negative.

## 2021-09-01 ENCOUNTER — OFFICE VISIT (OUTPATIENT)
Dept: CARDIOLOGY CLINIC | Age: 76
End: 2021-09-01
Payer: MEDICARE

## 2021-09-01 VITALS
BODY MASS INDEX: 30.78 KG/M2 | SYSTOLIC BLOOD PRESSURE: 130 MMHG | WEIGHT: 215 LBS | HEART RATE: 88 BPM | DIASTOLIC BLOOD PRESSURE: 62 MMHG | HEIGHT: 70 IN

## 2021-09-01 DIAGNOSIS — R00.1 BRADYCARDIA: ICD-10-CM

## 2021-09-01 DIAGNOSIS — E87.1 HYPONATREMIA: Primary | ICD-10-CM

## 2021-09-01 PROCEDURE — 1111F DSCHRG MED/CURRENT MED MERGE: CPT | Performed by: INTERNAL MEDICINE

## 2021-09-01 PROCEDURE — 99213 OFFICE O/P EST LOW 20 MIN: CPT | Performed by: INTERNAL MEDICINE

## 2021-09-01 NOTE — PROGRESS NOTES
[address] [place] [address]  Dept: [phone]  Dept Fax: [phone]  Loc: [phone]    Visit Date: 9/1/2021    Mr. Sudhir Ray is a 78 y.o. male  who presented for:  Chief Complaint   Patient presents with    Follow-Up from Hospital    Congestive Heart Failure       HPI:   HPI   77 yo M hx of LAD PCI 2018,+ smoker, EF 55-60%, grade 1DD, ECG with trifasicular block who presents for follow-up. No chest pain, angina, LOPEZ, orthopnea, PND, sob at rest, palpitations, LE edema, or syncope. HR drops into the 20s, following with Hakim, may need PM.  Had significant hyponatremia at the hospital that was treated appropriately. Current Outpatient Medications:     folic acid (FOLVITE) 1 MG tablet, Take 1 tablet by mouth daily, Disp: 30 tablet, Rfl: 3    sodium chloride 1 g tablet, Take 1 tablet by mouth 2 times daily (with meals), Disp: 90 tablet, Rfl: 3    thiamine 100 MG tablet, Take 1 tablet by mouth daily, Disp: 30 tablet, Rfl: 3    amLODIPine (NORVASC) 5 MG tablet, TAKE 1 TABLET BY MOUTH ONE TIME A DAY (Patient taking differently: 10 mg TAKE 1 TABLET BY MOUTH ONE TIME A DAY), Disp: 30 tablet, Rfl: 1    escitalopram (LEXAPRO) 10 MG tablet, Take 10 mg by mouth daily, Disp: , Rfl:     prasugrel (EFFIENT) 10 MG TABS, Take 1 tablet by mouth daily, Disp: 90 tablet, Rfl: 3    atorvastatin (LIPITOR) 80 MG tablet, TAKE 1 TABLET BY MOUTH ONE TIME A DAY AT NIGHT, Disp: 90 tablet, Rfl: 3    Compression Stockings MISC, by Does not apply route, Disp: 1 each, Rfl: 1    nitroGLYCERIN (NITROSTAT) 0.4 MG SL tablet, Place 1 tablet under the tongue every 5 minutes as needed for Chest pain up to max of 3 total doses.  If no relief after 1 dose, call 911., Disp: 25 tablet, Rfl: 1    aspirin 81 MG chewable tablet, Take 1 tablet by mouth daily, Disp: 30 tablet, Rfl: 0    levothyroxine (SYNTHROID) 25 MCG tablet, Take 25 mcg by mouth Daily, Disp: , Rfl:     tamsulosin (FLOMAX) 0.4 MG capsule, Take 0.4 mg by mouth daily, Disp: , Rfl:     Past Medical History  Jacquelin Ashford  has a past medical history of Angina at rest [facility]), Hypertension, Precordial pain, S/P cardiac cath: 1/25/2018: 70-75% mid-LAD. LCX OK. RCA 20%. LVEF 454-50%. LVEDP 12 mmHg., S/P PTCA: 1/25/2018: FFR-guided stenting of mid-LAD Xience 2.75x28 mm, post-dilated to 3.78 mm proximally. , and Thyroid disease. Social History  Jacquelin Ashford  reports that he has been smoking cigarettes. He has a 37.50 pack-year smoking history. He quit smokeless tobacco use about 7 years ago. He reports current alcohol use. He reports that he does not use drugs. Family History  Jacquelin Ashford family history includes Cancer in his mother; Heart Disease in his father. There is no family history of bicuspid aortic valve, aneurysms, heart transplant, pacemakers, defibrillators, or sudden cardiac death. Past Surgical History   Past Surgical History:   Procedure Laterality Date    CARDIAC CATHETERIZATION      CATARACT REMOVAL Bilateral 2017    CATARACT REMOVAL Bilateral     JOINT REPLACEMENT Left 2010    KNEE SURGERY Bilateral     scopes    PTCA         Review of Systems   Constitutional: Negative for chills and fever  HENT: Negative for congestion, sinus pressure, sneezing and sore throat. Eyes: Negative for pain, discharge, redness and itching. Respiratory: Negative for apnea, cough  Gastrointestinal: Negative for blood in stool, constipation, diarrhea   Endocrine: Negative for cold intolerance, heat intolerance, polydipsia. Genitourinary: Negative for dysuria, enuresis, flank pain and hematuria. Musculoskeletal: Negative for arthralgias, joint swelling and neck pain. Neurological: Negative for numbness and headaches. Psychiatric/Behavioral: Negative for agitation, confusion, decreased concentration and dysphoric mood.      Objective:     /62   Pulse 88   Ht 5' 10\" (1.778 m)   Wt 215 lb (97.5 kg)   BMI 30.85 kg/m²     Wt Readings from Last 3 Encounters:   09/01/21 215 lb (97.5 kg)   08/24/21 214 lb 14.4 oz (97.5 kg)   08/03/21 229 lb 8 oz (104.1 kg)     BP Readings from Last 3 Encounters:   09/01/21 130/62   08/27/21 131/71   08/03/21 122/72       Nursing note and vitals reviewed. Physical Exam   Constitutional: Oriented to person, place, and time. Appears well-developed and well-nourished. HENT:   Head: Normocephalic and atraumatic. Eyes: EOM are normal. Pupils are equal, round, and reactive to light. Neck: Normal range of motion. Neck supple. No JVD present. Cardiovascular: Normal rate, regular rhythm, normal heart sounds and intact distal pulses. No murmur heard. Pulmonary/Chest: Effort normal and breath sounds normal. No respiratory distress. No wheezes. No rales. Abdominal: Soft. Bowel sounds are normal. No distension. There is no tenderness. Musculoskeletal: Normal range of motion. No edema. Neurological: Alert and oriented to person, place, and time. No cranial nerve deficit. Coordination normal.   Skin: Skin is warm and dry. Psychiatric: Normal mood and affect.        No results found for: CKTOTAL, CKMB, CKMBINDEX    Lab Results   Component Value Date    WBC 6.1 08/25/2021    RBC 4.22 08/25/2021    RBC 0 06/08/2021    RBC 4.73 06/08/2021    HGB 14.7 08/25/2021    HCT 41.5 08/25/2021    MCV 98.3 08/25/2021    MCH 34.8 08/25/2021    MCHC 35.4 08/25/2021    RDW 13.4 06/08/2021     08/25/2021    MPV 9.7 08/25/2021       Lab Results   Component Value Date     08/27/2021    K 4.3 08/27/2021    CL 98 08/27/2021    CO2 21 08/27/2021    BUN 10 08/27/2021    LABALBU 4.4 08/24/2021    CREATININE 0.7 08/27/2021    CALCIUM 8.8 08/27/2021    LABGLOM >90 08/27/2021    GLUCOSE 79 08/27/2021    GLUCOSE 117 08/23/2021       Lab Results   Component Value Date    ALKPHOS 90 08/24/2021    ALT 46 08/24/2021    AST 37 08/24/2021    PROT 6.6 08/24/2021    BILITOT 0.6 was within normal limits. Signature    ----------------------------------------------------------------  Electronically signed by Caitie Crenshaw MD (Interpreting  physician) on 08/24/2018 at 04:44 PM  ----------------------------------------------------------------    Findings    Mitral Valve  The mitral valve structure was normal with normal leaflet separation. DOPPLER: The transmitral velocity was within the normal range with no  evidence for mitral stenosis. There was no evidence of mitral  regurgitation. Aortic Valve  The aortic valve was trileaflet with normal thickness and cuspal  separation. DOPPLER: Transaortic velocity was within the normal range with  no evidence of aortic stenosis. There was no evidence of aortic  regurgitation. Tricuspid Valve  The tricuspid valve structure was normal with normal leaflet separation. DOPPLER: There was no evidence of tricuspid stenosis. There was trace  tricuspid regurgitation. Pulmonic Valve  The pulmonic valve leaflets were not well seen. DOPPLER: The transpulmonic  velocity was within the normal range with no evidence for regurgitation. Left Atrium  Left atrial size was normal.    Left Ventricle  Normal left ventricle size and systolic function. Ejection fraction was  estimated at 50-55%. There were no regional left ventricular wall motion  abnormalities and wall thickness was within normal limits. E/A flow reversal noted. Suggestive of diastolic dysfunction. Right Atrium  Right atrial size was normal.    Right Ventricle  The right ventricular size was normal with normal systolic function and  wall thickness. Pericardial Effusion  The pericardium was normal in appearance with no evidence of a pericardial  effusion. Pleural Effusion  No evidence of pleural effusion. Aorta / Great Vessels  IVC not well seen.     M-Mode/2D Measurements & Calculations    LV Diastolic   LV Systolic Dimension:    AV Cusp Separation: 1.8 cmLA  Dimension: 5.1 3.7

## 2021-09-06 ENCOUNTER — HOSPITAL ENCOUNTER (EMERGENCY)
Age: 76
Discharge: HOME | End: 2021-09-06
Attending: INTERNAL MEDICINE
Payer: MEDICARE

## 2021-09-06 VITALS
OXYGEN SATURATION: 98 % | DIASTOLIC BLOOD PRESSURE: 98 MMHG | RESPIRATION RATE: 18 BRPM | HEIGHT: 70 IN | BODY MASS INDEX: 30.78 KG/M2 | HEART RATE: 82 BPM | WEIGHT: 215 LBS | SYSTOLIC BLOOD PRESSURE: 147 MMHG | TEMPERATURE: 97.4 F

## 2021-09-06 DIAGNOSIS — R04.0 EPISTAXIS: Primary | ICD-10-CM

## 2021-09-06 LAB
BASOPHILS # BLD: 0.3 %
BASOPHILS ABSOLUTE: 0 THOU/MM3 (ref 0–0.1)
EOSINOPHIL # BLD: 0.4 %
EOSINOPHILS ABSOLUTE: 0 THOU/MM3 (ref 0–0.4)
ERYTHROCYTE [DISTWIDTH] IN BLOOD BY AUTOMATED COUNT: 12.6 % (ref 11.5–14.5)
ERYTHROCYTE [DISTWIDTH] IN BLOOD BY AUTOMATED COUNT: 46.1 FL (ref 35–45)
HCT VFR BLD CALC: 45.4 % (ref 42–52)
HEMOGLOBIN: 15.6 GM/DL (ref 14–18)
IMMATURE GRANS (ABS): 0.04 THOU/MM3 (ref 0–0.07)
IMMATURE GRANULOCYTES: 0.4 %
LYMPHOCYTES # BLD: 13.1 %
LYMPHOCYTES ABSOLUTE: 1.2 THOU/MM3 (ref 1–4.8)
MCH RBC QN AUTO: 34.4 PG (ref 26–33)
MCHC RBC AUTO-ENTMCNC: 34.4 GM/DL (ref 32.2–35.5)
MCV RBC AUTO: 100 FL (ref 80–94)
MONOCYTES # BLD: 6.1 %
MONOCYTES ABSOLUTE: 0.5 THOU/MM3 (ref 0.4–1.3)
NUCLEATED RED BLOOD CELLS: 0 /100 WBC
PLATELET # BLD: 141 THOU/MM3 (ref 130–400)
PMV BLD AUTO: 9.6 FL (ref 9.4–12.4)
RBC # BLD: 4.54 MILL/MM3 (ref 4.7–6.1)
SEG NEUTROPHILS: 79.7 %
SEGMENTED NEUTROPHILS ABSOLUTE COUNT: 7.2 THOU/MM3 (ref 1.8–7.7)
WBC # BLD: 9 THOU/MM3 (ref 4.8–10.8)

## 2021-09-06 PROCEDURE — 99282 EMERGENCY DEPT VISIT SF MDM: CPT

## 2021-09-06 PROCEDURE — 85025 COMPLETE CBC W/AUTO DIFF WBC: CPT

## 2021-09-06 PROCEDURE — 36415 COLL VENOUS BLD VENIPUNCTURE: CPT

## 2021-09-06 ASSESSMENT — ENCOUNTER SYMPTOMS
ABDOMINAL PAIN: 0
ANAL BLEEDING: 0
SORE THROAT: 0
BLOOD IN STOOL: 0
COUGH: 0
SHORTNESS OF BREATH: 0

## 2021-09-06 NOTE — ED TRIAGE NOTES
Pt states he woke up this am around 5 with a nosebleed- Denies trauma- States it has not stopped since that time-He tried to hold pressure since that time with no improvement-denies other complaints at this time- Nasal clamp on per Dr All Carmona

## 2021-09-06 NOTE — ED NOTES
Released with discharge instructions at this time- alert, skin warm and dry-respirations even and unlabored- rhino rocket intact with no active bleeding noted     Kelle Manzanares RN  09/06/21 0177

## 2021-09-06 NOTE — ED PROVIDER NOTES
[place] ENCOUNTER          Pt Name: Matthew Denny  MRN: [id number]  [place] 1945  Date of evaluation: 9/6/2021  Treating Resident Physician: Rodrigo Ortega DO  Supervising Physician: Dr. Anisha Edge       Chief Complaint   Patient presents with    Epistaxis     History obtained from the patient. HISTORY OF PRESENT ILLNESS    HPI  Matthew Denny is a 78 y.o. male who presents to the emergency department for evaluation of epistaxis. The patient has no other acute complaints at this time. He states that his nosebleed began around 5 AM this morning. He has not been able to control the bleeding manually. On further evaluation, he has not been applying appropriate pressure or technique. He had tried having a tissue of his nose and applying one-sided pressure with one hand. He does take Effient and aspirin. He denies any lightheadedness, dizziness, shortness of breath, or chest pain. He has no other complaints at this time. REVIEW OF SYSTEMS   Review of Systems   Constitutional: Negative for chills and fever. HENT: Positive for nosebleeds. Negative for sore throat. Eyes: Negative for visual disturbance. Respiratory: Negative for cough and shortness of breath. Cardiovascular: Negative for chest pain and palpitations. Gastrointestinal: Negative for abdominal pain, anal bleeding and blood in stool. Genitourinary: Negative for hematuria. Musculoskeletal: Negative for joint swelling. Skin: Positive for wound. Right-sided nosebleed. Neurological: Negative for dizziness, weakness and light-headedness. Hematological: Bruises/bleeds easily. Psychiatric/Behavioral: The patient is not nervous/anxious.         PAST MEDICAL AND SURGICAL HISTORY     Past Medical History:   Diagnosis Date    Angina [facility] rest [facility]) 1/17/2018    Hypertension     Precordial pain 1/17/2018    S/P cardiac cath: 1/25/2018: mouth Daily, Disp: , Rfl:     tamsulosin (FLOMAX) 0.4 MG capsule, Take 0.4 mg by mouth daily, Disp: , Rfl:       SOCIAL HISTORY     Social History     Social History Narrative    Not on file     Social History     Tobacco Use    Smoking status: Current Every Day Smoker     Packs/day: 1.50     Years: 25.00     Pack years: 37.50     Types: Cigarettes    Smokeless tobacco: Former User     Quit date: 2014   Vaping Use    Vaping Use: Never used   Substance Use Topics    Alcohol use: Yes     Comment: 6-10 beers daily    Drug use: No         ALLERGIES   No Known Allergies      FAMILY HISTORY     Family History   Problem Relation Age of Onset    Cancer Mother         breast    Heart Disease Father          PREVIOUS RECORDS   Previous records reviewed: No significant history of epistaxis previously. Farhad Preciado PHYSICAL EXAM     ED Triage Vitals [09/06/21 1049]   BP Temp Temp Source Pulse Resp SpO2 Height Weight   (!) 147/98 97.4 °F (36.3 °C) Axillary 82 18 98 % 5' 10\" (1.778 m) 215 lb (97.5 kg)     Initial vital signs and nursing assessment reviewed and normal. Body mass index is 30.85 kg/m². Pulsoximetry is normal per my interpretation. Additional Vital Signs:  Vitals:    09/06/21 1049   BP: (!) 147/98   Pulse: 82   Resp: 18   Temp: 97.4 °F (36.3 °C)   SpO2: 98%       Physical Exam  Constitutional:       General: He is not in acute distress. Appearance: Normal appearance. HENT:      Head: Normocephalic and atraumatic. Nose: No nasal tenderness. Right Nostril: Epistaxis present. Right Sinus: No maxillary sinus tenderness or frontal sinus tenderness. Left Sinus: No maxillary sinus tenderness or frontal sinus tenderness. Comments: Epistaxis, anterior on right side  Cardiovascular:      Rate and Rhythm: Regular rhythm. Pulses: Normal pulses. Heart sounds: Normal heart sounds. Pulmonary:      Breath sounds: Wheezing present. Abdominal:      General: Abdomen is flat.  Bowel sounds are normal.   Musculoskeletal:         General: Normal range of motion. Comments: No changes from baseline of chronic lower extremity edema. Skin:     General: Skin is warm and dry. Findings: Bruising present. Neurological:      General: No focal deficit present. Mental Status: He is alert. Psychiatric:         Mood and Affect: Mood normal.         Behavior: Behavior normal.           MEDICAL DECISION MAKING   Initial Assessment:   1. Mr. Asher Enciso is a 77-year-old male with a past medical history of COPD, CAD s/p stent, cavitary lesion of the lung, and tobacco abuse, who presents for evaluation of epistaxis. He began having bleeding this morning around 5 AM and could not get it to stop using single finger point pressure. He does take Effient and aspirin. He denies any hematemesis, hematochezia, or melena. Plan:    Nasal clamp applied   CBC     Bleeding did not resolve after 30 minutes of nasal clamping. Rhino Rocket, 5.5 cm, inserted. ENT information provided. Informed him to call tomorrow morning. Strict discharge instructions discussed. ED RESULTS   Laboratory results:  Labs Reviewed   CBC WITH AUTO DIFFERENTIAL - Abnormal; Notable for the following components:       Result Value    RBC 4.54 (*)     .0 (*)     MCH 34.4 (*)     RDW-SD 46.1 (*)     All other components within normal limits       Radiologic studies results:  No orders to display       ED Medications administered this visit: Medications - No data to display      ED COURSE        Please see MDM above for ED course. Strict return precautions and follow up instructions were discussed with the patient prior to discharge, with which the patient agrees. MEDICATION CHANGES     New Prescriptions    No medications on file         FINAL DISPOSITION     Final diagnoses:   Epistaxis     Condition: condition: good  Dispo: Discharge to home      This transcription was electronically signed.  Parts of this transcriptions may have been dictated by use of voice recognition software and electronically transcribed, and parts may have been transcribed with the assistance of an ED scribe. The transcription may contain errors not detected in proofreading. Please refer to my supervising physician's documentation if my documentation differs.     Electronically Signed: Rodrigo Ortega DO, 09/06/21, 12:16 PM         Rodrigo Ortega DO  Resident  09/06/21 8318

## 2021-09-06 NOTE — ED NOTES
Nosebleed continues even with nasal clamp in place- Dr Schilling Held in to place rhino andre Casarez RN  09/06/21 1314

## 2021-09-07 ENCOUNTER — TELEPHONE (OUTPATIENT)
Dept: ENT CLINIC | Age: 76
End: 2021-09-07

## 2021-09-07 NOTE — TELEPHONE ENCOUNTER
Patient called and he has a nasal packing in. He stated that he was seen in the ER and packed late afternoon yesterday. Informed patient we recommended to have them left in place for 3 days and we can get him scheduled. Please advise where to schedule patient.

## 2021-09-09 ENCOUNTER — OFFICE VISIT (OUTPATIENT)
Dept: ENT CLINIC | Age: 76
End: 2021-09-09
Payer: MEDICARE

## 2021-09-09 VITALS
HEART RATE: 74 BPM | DIASTOLIC BLOOD PRESSURE: 76 MMHG | RESPIRATION RATE: 14 BRPM | WEIGHT: 214 LBS | TEMPERATURE: 97.1 F | BODY MASS INDEX: 30.71 KG/M2 | SYSTOLIC BLOOD PRESSURE: 118 MMHG

## 2021-09-09 DIAGNOSIS — R04.0 EPISTAXIS: Primary | ICD-10-CM

## 2021-09-09 PROCEDURE — 99203 OFFICE O/P NEW LOW 30 MIN: CPT | Performed by: OTOLARYNGOLOGY

## 2021-09-09 RX ORDER — OXYMETAZOLINE HYDROCHLORIDE 0.05 G/100ML
2 SPRAY NASAL 2 TIMES DAILY
Qty: 1 EACH | Refills: 3 | Status: SHIPPED | OUTPATIENT
Start: 2021-09-09 | End: 2022-09-09

## 2021-09-09 NOTE — PROGRESS NOTES
Heather, NOSE AND THROAT  [facility] [place]  Dept: [phone]  Dept Fax: [phone]: [phone]       Dear No ref. provider found, thank you for referring Elena Adair in consultation for a chief complaint of:epistaxis . My full evaluation is as follows:     HPI:     As you recall, Elena Adair is a 78 y.o. male who presents today for epistaxis. He went to ED on Monday, where a 5.5cm rhinorocket was placed. This is his first major nosebleed. No bleeding since. He does have humidifiers in his home. History:     No Known Allergies  Current Outpatient Medications   Medication Sig Dispense Refill    oxymetazoline (12 HOUR NASAL SPRAY) 0.05 % nasal spray 2 sprays by Nasal route 2 times daily 1 each 3    folic acid (FOLVITE) 1 MG tablet Take 1 tablet by mouth daily 30 tablet 3    sodium chloride 1 g tablet Take 1 tablet by mouth 2 times daily (with meals) 90 tablet 3    thiamine 100 MG tablet Take 1 tablet by mouth daily 30 tablet 3    amLODIPine (NORVASC) 5 MG tablet TAKE 1 TABLET BY MOUTH ONE TIME A DAY (Patient taking differently: 10 mg TAKE 1 TABLET BY MOUTH ONE TIME A DAY) 30 tablet 1    escitalopram (LEXAPRO) 10 MG tablet Take 10 mg by mouth daily      prasugrel (EFFIENT) 10 MG TABS Take 1 tablet by mouth daily 90 tablet 3    atorvastatin (LIPITOR) 80 MG tablet TAKE 1 TABLET BY MOUTH ONE TIME A DAY AT NIGHT 90 tablet 3    Compression Stockings MISC by Does not apply route 1 each 1    nitroGLYCERIN (NITROSTAT) 0.4 MG SL tablet Place 1 tablet under the tongue every 5 minutes as needed for Chest pain up to max of 3 total doses.  If no relief after 1 dose, call 911. 25 tablet 1    aspirin 81 MG chewable tablet Take 1 tablet by mouth daily 30 tablet 0    levothyroxine (SYNTHROID) 25 MCG tablet Take 25 mcg by mouth Daily      tamsulosin (FLOMAX) 0.4 MG capsule Take 0.4 mg by mouth daily       No current facility-administered medications for this visit. Past Medical History:   Diagnosis Date    [facility]) 1/17/2018    Hypertension     Precordial pain 1/17/2018    S/P cardiac cath: 1/25/2018: 70-75% mid-LAD. LCX OK. RCA 20%. LVEF 454-50%. LVEDP 12 mmHg. 1/25/2018 1/25/2018: 70-75% mid-LAD. LCX OK. RCA 20%. LVEF 454-50%. LVEDP 12 mmHg. Dr. Nasir Payne S/P PTCA: 1/25/2018: FFR-guided stenting of mid-LAD Xience 2.75x28 mm, post-dilated to 3.78 mm proximally. 1/25/2018 1/25/2018: FFR-guided stenting of mid-LAD Xience 2.75x28 mm, post-dilated to 3.78 mm proximally. Dr. Nasir Payne Thyroid disease       Past Surgical History:   Procedure Laterality Date    CARDIAC CATHETERIZATION      CATARACT REMOVAL Bilateral 2017    CATARACT REMOVAL Bilateral     JOINT REPLACEMENT Left 2010    KNEE SURGERY Bilateral     scopes    PTCA       Family History   Problem Relation Age of Onset    Cancer Mother         breast    Heart Disease Father      Social History     Tobacco Use    Smoking status: Current Every Day Smoker     Packs/day: 1.50     Years: 25.00     Pack years: 37.50     Types: Cigarettes    Smokeless tobacco: Former User     Quit date: 2014   Substance Use Topics    Alcohol use: Yes     Comment: 6-10 beers daily       All of the past medical history, past surgical history, family history,social history, allergies and current medications were reviewed with the patient. Review of Systems      A complete review of systems was obtained by the patient intake form, which is attached to this visit. Objective:   /76 (Site: Right Upper Arm, Position: Sitting)   Pulse 74   Temp 97.1 °F (36.2 °C) (Infrared)   Resp 14   Wt 214 lb (97.1 kg)   BMI 30.71 kg/m²     PHYSICAL EXAM  ABNORMAL OTOLARYNGOLOGIC EXAM FINDINGS: Rhinorocket in right naris, with dried blood surrounding. OTHER PERTINENT EXAM FINDINGS:  GENERAL: Awake, NAD, Non-toxic appearing.  Normal voice quality  NEUROLOGICAL: GCS 15, Cranial nerves II-VI, VIII-XII grossly intact,  Facial nerve (VII) w/ House-Brackman Grade 1 of 6 bilaterally, No evidence of nystagmus or gross asymmetry    EARS: Pinna well-formed,  EAC non-stenotic w/o cerumen impaction AU,  TM's intact AU w/o effusion or active infection appreciated  EYES: EOMI, No ptosis appreciated   NOSE: Dorsum w/o scar or deformity,  No mucopurulence appreciated, Patent nasal airways bilaterally. No inferior turbinate hypertrophy. No septal deviation. ORAL CAVITY: No mucosal masses/lesions appreciated, Tongue with FROM. Appropriate LAM w/o trismus. OROPHARYNX: No mucosal masses or lesions appreciated. Symmetric palatal elevation w/o draping. NECK: Soft, supple. No crepitus or masses appreciated,  Trachea midline. No thyromegaly or thyroid tenderness. Procedure performed: Rigid Nasal Endoscopy with cautery  Attending: Manny Odom MD  Indications: Recurrent epistaxis  Anesthesia: Topical oxymetazoline  Blood loss: 3mL  Specimens and Cultures: None  Findings: Rigid nasal endoscopy was performed to examine the bilateral nasal cavities for enlarged blood vessels causing recurrent epistaxis. After the pack was removed, the right side began to bleed. The septum and IT were raw where the packing had been placed, and were oozing blood. An afrin soaked pledget was placed. Next a 4cm nasopore was covered in bacitracin and placed. Procedure: The patient was taken to the procedure room and placed upright in the chair. Local anesthesia was administered to the bilateral nasal passageways. A 0 degree pediatric rigid nasal endoscope was then used to examine the bilateral nasal passages. The above findings were noted. The patient tolerated the procedure well, although he did vagal for a minute. There were no complications. Data: The relevant prior clinic notes were reviewed today, including the referring physicians notes. The Ed note was reviewed.      Imaging: None Assessment & Plan   Jolly Wilson is a 78 y.o. male with:   1. Epistaxis         Recurrent epistaxis  -I discussed the etiology of the problem with the patient. We discussed the mainstay of therapy, which is improved nasal hygiene. I recommended nasal saline sprays at least 3 times a day, with nasal irrigations anytime nasal crusting develops. I will also have him apply Vaseline to the septum nightly for the next 2 weeks. We also discussed a bedside humidifier, and personal steamer, which will be particularly useful during the wintertime. We discussed management regimens of epistaxis, including holding pressure for 15 minutes without a break, and the sparing use of Afrin. Return in about 3 weeks (around 9/30/2021). Thank you for involving me in this patient's care. Please do not hesitate to call with any questions or concerns. Sincerely,    Paulette Medina M.D. Otolaryngology-Head and Neck Surgery    **This report has been created using voice recognition software. It may contain minor errors which are inherent in voice recognition technology. **

## 2021-09-09 NOTE — PATIENT INSTRUCTIONS
Nasal and Sinus Irrigations (Isotonic Saline)    Start nasal irrigation on Saturday. Purpose  Nasal and sinus irrigations help to remove crusts, dried blood, clots, mucus, etc. from  the nose. In addition, these irrigations may also decrease the swelling in the nasal and  sinus linings. These irrigations are often used on a long-term basis to keep the nose  and sinuses clean. You can purchase a bottle and saline packets at any pharmacy or  you can make your own (see below). Isotonic Saline  1 teaspoon of shruti/pickling salt (not table salt)  1/2 teaspoon of baking soda  1 quart of water (tap water OK; bottled or distilled water OK; not well water)    Directions   Use a large piston-type syringe (also known as an irrigating syringe) or a nasal  squeeze bottle (see below). Avoid bulb-type syringes.  Place the tip of the syringe (or squeeze bottle) at the nostril and irrigate with  the saline mixture. The irrigation should be rigorous, since it cleans the nose  by mechanically removing debris, crusts, mucus, etc. The irrigation should be  forceful - like washing dirt from a driveway with water from a hose.  Irrigate each nostril with 1 cup of the saline mixture 3-4 times per day.  After each irrigation, simply wash the syringe (or squeeze bottle) with  ordinary tap water and mild soap. Let the syringe air-dry. Separate the barrel  and piston of the syringe for cleaning.  When the syringe (or squeeze bottle) becomes permanently soiled or if it  becomes difficult to use, replace it with a new one.  If using for ? 3 months, please dispense of your bottle and obtain a new one. Patient Education        Nosebleeds: Care Instructions  Your Care Instructions     Nosebleeds are common, especially if you have colds or allergies. Many things can cause a nosebleed. Some nosebleeds stop on their own with pressure. Others need packing. Some get cauterized (sealed).  If you have gauze or other packing materials in your nose, you will need to follow up with your doctor to have the packing removed. You may need more treatment if you get nosebleeds a lot. The doctor has checked you carefully, but problems can develop later. If you notice any problems or new symptoms, get medical treatment right away. Follow-up care is a key part of your treatment and safety. Be sure to make and go to all appointments, and call your doctor if you are having problems. It's also a good idea to know your test results and keep a list of the medicines you take. How can you care for yourself at home? · If you get another nosebleed:  ? Sit up and tilt your head slightly forward. This keeps blood from going down your throat. ? Use your thumb and index finger to pinch your nose shut for 10 minutes. Use a clock. Do not check to see if the bleeding has stopped before the 10 minutes are up. If the bleeding has not stopped, pinch your nose shut for another 10 minutes. ? When the bleeding has stopped, try not to pick, rub, or blow your nose for 12 hours. Avoiding these things helps keep your nose from bleeding again. · If your doctor prescribed antibiotics, take them as directed. Do not stop taking them just because you feel better. You need to take the full course of antibiotics. To prevent nosebleeds  · Do not blow your nose too hard. · Try not to lift or strain after a nosebleed. · Raise your head on a pillow while you sleep. · Put a thin layer of a saline- or water-based nasal gel, such as NasoGel, inside your nose. Put it on the septum, which divides your nostrils. This will prevent dryness that can cause nosebleeds. · Use a vaporizer or humidifier to add moisture to your bedroom. Follow the directions for cleaning the machine. · Do not use aspirin, ibuprofen (Advil, Motrin), or naproxen (Aleve) for 36 to 48 hours after a nosebleed unless your doctor tells you to. You can use acetaminophen (Tylenol) for pain relief.   · Talk to your doctor about stopping any other medicines you are taking. Some medicines may make you more likely to get a nosebleed. · Do not use cold medicines or nasal sprays without first talking to your doctor. They can make your nose dry. When should you call for help? Call 911 anytime you think you may need emergency care. For example, call if:    · You passed out (lost consciousness). Call your doctor now or seek immediate medical care if:    · You get another nosebleed and your nose is still bleeding after you have applied pressure 3 times for 10 minutes each time (30 minutes total).     · There is a lot of blood running down the back of your throat even after you pinch your nose and tilt your head forward.     · You have a fever.     · You have sinus pain. Watch closely for changes in your health, and be sure to contact your doctor if:    · You get nosebleeds often, even if they stop.     · You do not get better as expected. Where can you learn more? Go to [email or web address]. org and sign in to your [employer] account. Enter S156 in the [employer] box to learn more about \"Nosebleeds: Care Instructions. \"     If you do not have an account, please click on the \"Sign Up Now\" link. Current as of: October 19, 2020               Content Version: 12.9  © 2006-2021 [employer]wise[employer]. Care instructions adapted under license by [facility] ([facility]). If you have questions about a medical condition or this instruction, always ask your healthcare professional. [place] [address] any warranty or liability for your use of this information.

## 2021-09-14 NOTE — PROGRESS NOTES
[facility] [place]  Dept: [phone]    CARDIAC ELECTROPHYSIOLOGY: CONSULTATION NOTE  PATIENT DEMOGRAPHICS:  Date:   9/15/2021  Patient name:              Samule Goodell  YOB: 1945  Sex: male   MRN:   [id number]    PRIMARY CARE PHYSICIAN:   Terra Mayen MD    REFERRING PROVIDER:  Mp Garcia MD    REASON FOR CONSULTATION:  Sinus bradycardia evaluation of PPM implantation. HISTORY OF PRESENT ILLNESS:  76/M was admitted to [address] [place] [address] on 8/24/2021 for acute hyponatremia, detected incidentally during the lab work. He was treated with intravenous saline. During hospitalization he had an episode of bradycardia, heart rate drifted down to 20 bpm which normalized spontaneously. The patient was laying flat in bed at that time and did not report any symptom. Metoprolol was discontinued. He has bifascicular block and was referred here for evaluation of pacemaker planta    The patient complains of intermittent lightheadedness on assuming upright postures. Denies chest pain, syncope or lower extremity swelling. He has history of shortness of breath from COPD which is stable. Medical hx: An episode of sinus bradycardia during hospitalization, prolonged GA interval, bifascicular block (RBBB, LAFB), CAD/PCI-LAD (2018), HFpEF (LVEF 55-60%), HTN, chronic smoking, excessive alcohol use, and hypothyroidism. REVIEW OF SYSTEMS:    Constitutional: Negative for chills and fever  HENT: Negative for congestion, sinus pressure, sneezing and sore throat. Eyes: Negative for pain, discharge, redness and itching. Respiratory: Negative for apnea, cough  Gastrointestinal: Negative for blood in stool, constipation, diarrhea   Endocrine: Negative for cold intolerance, heat intolerance, polydipsia. Genitourinary: Negative for dysuria, enuresis, flank pain and hematuria.    Musculoskeletal: Negative for arthralgias, joint swelling and neck pain. Neurological: Negative for numbness and headaches. Psychiatric/Behavioral: Negative for agitation, confusion, decreased concentration and dysphoric mood. PAST MEDICAL HISTORY:  Past Medical History:   Diagnosis Date    Angina at rest [facility]) 1/17/2018    Hypertension     Precordial pain 1/17/2018    S/P cardiac cath: 1/25/2018: 70-75% mid-LAD. LCX OK. RCA 20%. LVEF 454-50%. LVEDP 12 mmHg. 1/25/2018 1/25/2018: 70-75% mid-LAD. LCX OK. RCA 20%. LVEF 454-50%. LVEDP 12 mmHg. Dr. Adriana Kim S/P PTCA: 1/25/2018: FFR-guided stenting of mid-LAD Xience 2.75x28 mm, post-dilated to 3.78 mm proximally. 1/25/2018 1/25/2018: FFR-guided stenting of mid-LAD Xience 2.75x28 mm, post-dilated to 3.78 mm proximally. Dr. Adriana Kim Thyroid disease        PSH:  Past Surgical History:   Procedure Laterality Date    CARDIAC CATHETERIZATION      CATARACT REMOVAL Bilateral 2017    CATARACT REMOVAL Bilateral     JOINT REPLACEMENT Left 2010    KNEE SURGERY Bilateral     scopes    PTCA         FAMILY HISTORY:  Family History   Problem Relation Age of Onset    Cancer Mother         breast    Heart Disease Father         SOCIAL HISTORY:  Patient is . He has grownup children. Smokes 1-1/2 packs of cigarettes daily and drinks 3-6 alcoholic beverage daily. Lives by himself. Social History     Socioeconomic History    Marital status:       Spouse name: Not on file    Number of children: Not on file    Years of education: Not on file    Highest education level: Not on file   Occupational History    Not on file   Tobacco Use    Smoking status: Current Every Day Smoker     Packs/day: 1.50     Years: 25.00     Pack years: 37.50     Types: Cigarettes    Smokeless tobacco: Former User     Quit date: 2014   Vaping Use    Vaping Use: Never used   Substance and Sexual Activity    Alcohol use: Yes     Comment: 6-10 beers daily    Drug use: No    Sexual activity: Never Other Topics Concern    Not on file   Social History Narrative    Not on file     Social Determinants of Health     Financial Resource Strain:     Difficulty of Paying Living Expenses:    Food Insecurity:     Worried About Running Out of Food in the Last Year:     [address] in the Last Year:    Transportation Needs:     Lack of Transportation (Medical):  Lack of Transportation (Non-Medical):    Physical Activity:     Days of Exercise per Week:     Minutes of Exercise per Session:    Stress:     Feeling of Stress :    Social Connections:     Frequency of Communication with Friends and Family:     Frequency of Social Gatherings with Friends and Family:     Attends [employer] Services:     Active Member of Clubs or Organizations:     Attends Club or Organization Meetings:     Marital Status:    Intimate Partner Violence:     Fear of Current or Ex-Partner:     Emotionally Abused:     Physically Abused:     Sexually Abused:          ALLERGY HISTORY:  No Known Allergies     MEDICATIONS:  Current Outpatient Medications   Medication Sig Dispense Refill    oxymetazoline (12 HOUR NASAL SPRAY) 0.05 % nasal spray 2 sprays by Nasal route 2 times daily 1 each 3    folic acid (FOLVITE) 1 MG tablet Take 1 tablet by mouth daily 30 tablet 3    sodium chloride 1 g tablet Take 1 tablet by mouth 2 times daily (with meals) 90 tablet 3    thiamine 100 MG tablet Take 1 tablet by mouth daily 30 tablet 3    amLODIPine (NORVASC) 5 MG tablet TAKE 1 TABLET BY MOUTH ONE TIME A DAY (Patient taking differently: 10 mg TAKE 1 TABLET BY MOUTH ONE TIME A DAY) 30 tablet 1    escitalopram (LEXAPRO) 10 MG tablet Take 10 mg by mouth daily      prasugrel (EFFIENT) 10 MG TABS Take 1 tablet by mouth daily 90 tablet 3    atorvastatin (LIPITOR) 80 MG tablet TAKE 1 TABLET BY MOUTH ONE TIME A DAY AT NIGHT 90 tablet 3    Compression Stockings MISC by Does not apply route 1 each 1    nitroGLYCERIN (NITROSTAT) 0.4 MG SL tablet prasugrel. · HFpEF (LVEF 55%),  · COPD, chronic smoking and excess alcohol use. 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 He has strong indication for beta-blockers. High probability of intermittent complete heart block, resulting in bradycardia. Discussed possibility of a permanent pacemaker implantation to facilitate the use of beta-blockers for the management of his coronary artery disease. However, I he does not strongly feel about it. Cardiac monitor was recommended and he wishes to have a loop recorder. Continue holding beta-blockers at this time. Recommendation about pacemaker implantation depends upon the documentation of bradyarrhythmias on loop recorder. Both patient and his daughter were agreeable. Thank you for allowing me to participate in the care of your patient. Please call me if you have any questions. **This report has been created using voice recognition software. It may contain minor errors which are inherent in voice recognition technology. **       Maximino Nicholson MD, MRCP, [facility] [place], [facility] on 9/15/2021 at 12:27 PM

## 2021-09-15 ENCOUNTER — OFFICE VISIT (OUTPATIENT)
Dept: CARDIOLOGY CLINIC | Age: 76
End: 2021-09-15
Payer: MEDICARE

## 2021-09-15 VITALS
HEIGHT: 71 IN | SYSTOLIC BLOOD PRESSURE: 134 MMHG | HEART RATE: 94 BPM | DIASTOLIC BLOOD PRESSURE: 86 MMHG | BODY MASS INDEX: 30.32 KG/M2 | WEIGHT: 216.6 LBS | OXYGEN SATURATION: 99 %

## 2021-09-15 DIAGNOSIS — R06.02 SOB (SHORTNESS OF BREATH) ON EXERTION: Primary | ICD-10-CM

## 2021-09-15 DIAGNOSIS — R42 DIZZINESS: ICD-10-CM

## 2021-09-15 PROCEDURE — 93000 ELECTROCARDIOGRAM COMPLETE: CPT | Performed by: INTERNAL MEDICINE

## 2021-09-15 PROCEDURE — 99204 OFFICE O/P NEW MOD 45 MIN: CPT | Performed by: INTERNAL MEDICINE

## 2021-09-15 RX ORDER — LISINOPRIL 10 MG/1
10 TABLET ORAL 2 TIMES DAILY
COMMUNITY
End: 2021-09-15

## 2021-09-15 NOTE — PATIENT INSTRUCTIONS
Your Loop implant is scheduled for 09.20.2021  Arrival time of 5am    Diet:  Nothing to eat or drink after midnight  Medications: You may continue your current medications   Admission:  Please report to the 2nd floor - [address] [facility] at [place] [address]. Please bring your actual bottle of medications with you to the hospital.   Bring your photo ID and insurance card      You may receive a survey regarding the care you received during your visit. Your input is valuable to us. We encourage you to complete and return your survey. We hope you will choose us in the future for your healthcare needs.

## 2021-09-30 ENCOUNTER — NURSE ONLY (OUTPATIENT)
Dept: CARDIOLOGY CLINIC | Age: 76
End: 2021-09-30

## 2021-09-30 DIAGNOSIS — R00.1 BRADYCARDIA: Primary | ICD-10-CM

## 2021-09-30 NOTE — PROGRESS NOTES
medtronic linq loop recorder placed for bradycardia     Reviewed use of carelink and recorder                   . .Large dressing removed, steri strips replaced minor puffiness, shadow of bruising, no drainage, tender to touch.     If, at any point, there is any increased redness, swelling, increased discomfort, fever, or the incision opens up, the patient is aware to go to the emergency room

## 2021-10-05 ENCOUNTER — OFFICE VISIT (OUTPATIENT)
Dept: NEPHROLOGY | Age: 76
End: 2021-10-05
Payer: MEDICARE

## 2021-10-05 ENCOUNTER — OFFICE VISIT (OUTPATIENT)
Dept: CARDIOLOGY CLINIC | Age: 76
End: 2021-10-05
Payer: MEDICARE

## 2021-10-05 VITALS
SYSTOLIC BLOOD PRESSURE: 138 MMHG | DIASTOLIC BLOOD PRESSURE: 88 MMHG | WEIGHT: 225 LBS | OXYGEN SATURATION: 97 % | HEIGHT: 71 IN | BODY MASS INDEX: 31.5 KG/M2 | HEART RATE: 88 BPM

## 2021-10-05 VITALS
SYSTOLIC BLOOD PRESSURE: 151 MMHG | WEIGHT: 225 LBS | DIASTOLIC BLOOD PRESSURE: 100 MMHG | BODY MASS INDEX: 31.38 KG/M2 | OXYGEN SATURATION: 99 % | HEART RATE: 91 BPM

## 2021-10-05 DIAGNOSIS — E87.1 HYPONATREMIA: Primary | ICD-10-CM

## 2021-10-05 DIAGNOSIS — I50.31: Primary | ICD-10-CM

## 2021-10-05 DIAGNOSIS — I10 PRIMARY HYPERTENSION: ICD-10-CM

## 2021-10-05 DIAGNOSIS — R91.1 NODULE OF LEFT LUNG: ICD-10-CM

## 2021-10-05 DIAGNOSIS — E87.1 HYPONATREMIA: ICD-10-CM

## 2021-10-05 DIAGNOSIS — R00.1 BRADYCARDIA: ICD-10-CM

## 2021-10-05 DIAGNOSIS — E03.9 ACQUIRED HYPOTHYROIDISM: ICD-10-CM

## 2021-10-05 PROCEDURE — 99213 OFFICE O/P EST LOW 20 MIN: CPT | Performed by: INTERNAL MEDICINE

## 2021-10-05 PROCEDURE — 99214 OFFICE O/P EST MOD 30 MIN: CPT | Performed by: NURSE PRACTITIONER

## 2021-10-05 RX ORDER — FUROSEMIDE 20 MG/1
20 TABLET ORAL DAILY
Qty: 90 TABLET | Refills: 3 | Status: SHIPPED | OUTPATIENT
Start: 2021-10-05 | End: 2022-07-20

## 2021-10-05 RX ORDER — POTASSIUM CHLORIDE 750 MG/1
10 TABLET, EXTENDED RELEASE ORAL DAILY
Qty: 90 TABLET | Refills: 3 | Status: SHIPPED | OUTPATIENT
Start: 2021-10-05

## 2021-10-05 RX ORDER — NITROGLYCERIN 0.4 MG/1
0.4 TABLET SUBLINGUAL EVERY 5 MIN PRN
Qty: 25 TABLET | Refills: 1 | Status: SHIPPED | OUTPATIENT
Start: 2021-10-05

## 2021-10-05 ASSESSMENT — ENCOUNTER SYMPTOMS
SHORTNESS OF BREATH: 0
COUGH: 1
VOMITING: 0
NAUSEA: 0
ABDOMINAL DISTENTION: 0

## 2021-10-05 NOTE — PROGRESS NOTES
Renal Progress Note    Assessment and Plan:      Diagnosis Orders   1. Hyponatremia     2. Primary hypertension     3. Acquired hypothyroidism     4. Nodule of left lung       PLAN:  Reviewed labs with the patient   We went through the labs together in epic   He understood   I addressed his questions   Serum sodium is improved to 134 mEq/l from a thomas of 117 mEq/l   Medications reviewed   No changes   Discussed with the findings of left lung nodule discovered in the hospital.  I offered to refer him to Pulmonologist.  He declined  Return visit in 3 months with labs    Patient Active Problem List   Diagnosis    Precordial pain    Angina at rest [facility])    S/P cardiac cath: 1/25/2018: 70-75% mid-LAD. LCX OK. RCA 20%. LVEF 454-50%. LVEDP 12 mmHg.  S/P PTCA: 1/25/2018: FFR-guided stenting of mid-LAD Xience 2.75x28 mm, post-dilated to 3.78 mm proximally.  Chronic obstructive pulmonary disease, unspecified ([address]    Hyponatremia    Fatigue    Smoker unmotivated to quit    Cavitary lesion of lung    Bradycardia       1. Subjective:   Chief complaint:  Chief Complaint   Patient presents with    Follow-Up from Hospital      HPI:This is a follow up visit for Mr. Clarisse Oneal who is here today for return appointment following hospital discharge. I saw him at [facility] for hyponatremia. He was on escitalopram which was thought to be a contributing factor. He is no longer taking the medicine. Doing well since discharge. No chest pain or shortness of breath. No nausea vomiting. No fever chills. ROS:  Pertinent positives stated above in HPI. All other systems were reviewed and were negative.   Medications:     Current Outpatient Medications   Medication Sig Dispense Refill    oxymetazoline (12 HOUR NASAL SPRAY) 0.05 % nasal spray 2 sprays by Nasal route 2 times daily 1 each 3    folic acid (FOLVITE) 1 MG tablet Take 1 tablet by mouth daily 30 tablet 3    sodium chloride 1 g tablet Take 1 tablet by mouth 2 times daily (with meals) 90 tablet 3    thiamine 100 MG tablet Take 1 tablet by mouth daily 30 tablet 3    amLODIPine (NORVASC) 5 MG tablet TAKE 1 TABLET BY MOUTH ONE TIME A DAY 30 tablet 1    prasugrel (EFFIENT) 10 MG TABS Take 1 tablet by mouth daily 90 tablet 3    atorvastatin (LIPITOR) 80 MG tablet TAKE 1 TABLET BY MOUTH ONE TIME A DAY AT NIGHT 90 tablet 3    aspirin 81 MG chewable tablet Take 1 tablet by mouth daily 30 tablet 0    tamsulosin (FLOMAX) 0.4 MG capsule Take 0.4 mg by mouth daily      escitalopram (LEXAPRO) 10 MG tablet Take 10 mg by mouth daily      Compression Stockings MISC by Does not apply route (Patient not taking: Reported on 9/15/2021) 1 each 1    nitroGLYCERIN (NITROSTAT) 0.4 MG SL tablet Place 1 tablet under the tongue every 5 minutes as needed for Chest pain up to max of 3 total doses. If no relief after 1 dose, call 911. (Patient not taking: Reported on 10/5/2021) 25 tablet 1    levothyroxine (SYNTHROID) 25 MCG tablet Take 25 mcg by mouth Daily       No current facility-administered medications for this visit.        Lab Results:    CBC:   Lab Results   Component Value Date    WBC 9.0 09/06/2021    HGB 15.6 09/06/2021    HCT 45.4 09/06/2021    .0 (H) 09/06/2021     09/06/2021     BMP:    Lab Results   Component Value Date     (L) 09/20/2021     (L) 08/27/2021     (L) 08/26/2021    K 4.1 09/20/2021    K 4.3 08/27/2021    K 4.1 08/26/2021    CL 98 09/20/2021    CL 98 08/27/2021    CL 96 (L) 08/26/2021    CO2 26 09/20/2021    CO2 21 (L) 08/27/2021    CO2 24 08/26/2021    BUN 7 09/20/2021    BUN 10 08/27/2021    BUN 9 08/26/2021    CREATININE 0.8 09/20/2021    CREATININE 0.7 08/27/2021    CREATININE 0.8 08/26/2021    GLUCOSE 113 (H) 09/20/2021    GLUCOSE 79 08/27/2021    GLUCOSE 80 08/26/2021      Hepatic:   Lab Results   Component Value Date    AST 37 08/24/2021    AST 27 06/08/2021    AST 30 02/21/2020    ALT 46 08/24/2021    ALT 20 06/08/2021    ALT 30 02/21/2020    BILITOT 0.6 08/24/2021    BILITOT Negative 06/08/2021    BILITOT 0.9 06/08/2021    ALKPHOS 90 08/24/2021    ALKPHOS 73 06/08/2021    ALKPHOS 76 02/21/2020     BNP: No results found for: BNP  Lipids:   Lab Results   Component Value Date    CHOL 117 (L) 06/08/2021    HDL 61 06/08/2021     INR:   Lab Results   Component Value Date    INR 1.00 01/26/2018     URINE:   Lab Results   Component Value Date    NAUR 30 08/24/2021    PROTUR Trace 06/08/2021     Lab Results   Component Value Date    NITRU Negative 06/08/2021    COLORU YELLOW 06/08/2021    PHUR 6.0 12/08/2017    SPECGRAV 1.011 12/08/2017    LEUKOCYTESUR Negative 06/08/2021    UROBILINOGEN <1.1 06/08/2021    BILIRUBINUR Negative 08/12/2020    BLOODU Negative 08/12/2020    BLOODU NEGATIVE 12/08/2017    GLUCOSEU Negative 08/12/2020    KETUA Negative 06/08/2021      Microalbumen/Creatinine ratio:  No components found for: RUCREAT    Objective:   Vitals: BP (!) 151/100 (Site: Left Upper Arm, Position: Sitting, Cuff Size: Large Adult)   Pulse 91   Wt 225 lb (102.1 kg)   SpO2 99%   BMI 31.38 kg/m²      Constitutional:  Alert, awake, no apparent distress  Skin:normal with no rash or any lesions  HEENT:Pupils are reactive . Throat is clear. Oral mucosa is moist.  Neck:supple with no thyromegaly or bruit   Cardiovascular:  S1, S2 without murmur   Respiratory:  Clear to auscultation with no wheezes or rales  Abdomen: +bowel sound, soft, non tender and no bruit  Ext: No LE edema  Musculoskeletal:Intact  Neuro:Alert, awake and oriented with no obvious focal deficit. Speech is normal.    Electronically signed by Talita Chapman MD on 10/5/2021 at 8:04 AM   **This report has been created using voice recognition software. It maycontain minor  errors which are inherent in voice recognition technology. **

## 2021-10-05 NOTE — PATIENT INSTRUCTIONS
You may receive a survey regarding the care you received during your visit. Your input is valuable to us. We encourage you to complete and return your survey. We hope you will choose us in the future for your healthcare needs. Continue:  · Continue current medications  · Daily weights and record  · Fluid restriction of 2 Liters per day  · Limit sodium in diet to around 0852-3984 mg/day  · Monitor BP  · Activity as tolerated     Call the [facility] for any of the following symptoms: [phone]   Weight gain of 2-3 pounds in 1 day or 5 pounds in 1 week   Increased shortness of breath   Shortness of breath while laying down   Cough   Chest pain   Swelling in feet, ankles or legs   Tenderness or bloating in the abdomen   Fatigue    Decreased appetite or nausea    Confusion       Stable, appears Euvolemic  Lab reviewed - stable  Repeat blood work in 2 weeks, and in 6 months  BP/HR stable   Adding 20mg lasix daily, adding 10 meq of KCl  Work on sodium intake  Continue daily wts.   F/U w/ Cardiology  F/U in clinic in 6 months  Start wearing compression socks

## 2021-10-05 NOTE — PROGRESS NOTES
stenting of mid-LAD Xience 2.75x28 mm, post-dilated to 3.78 mm proximally. 1/25/2018 1/25/2018: FFR-guided stenting of mid-LAD Xience 2.75x28 mm, post-dilated to 3.78 mm proximally. Dr. Yuly Shabazz Thyroid disease      Past Surgical History:   Procedure Laterality Date    CARDIAC CATHETERIZATION      CATARACT REMOVAL Bilateral 2017    CATARACT REMOVAL Bilateral     JOINT REPLACEMENT Left 2010    KNEE SURGERY Bilateral     scopes    PTCA       Family History   Problem Relation Age of Onset    Cancer Mother         breast    Heart Disease Father      Social History     Tobacco Use    Smoking status: Current Every Day Smoker     Packs/day: 1.50     Years: 25.00     Pack years: 37.50     Types: Cigarettes    Smokeless tobacco: Former User     Quit date: 2014   Substance Use Topics    Alcohol use: Yes     Comment: 4-6 beers daily     Current Outpatient Medications   Medication Sig Dispense Refill    folic acid (FOLVITE) 1 MG tablet Take 1 tablet by mouth daily 30 tablet 3    sodium chloride 1 g tablet Take 1 tablet by mouth 2 times daily (with meals) 90 tablet 3    thiamine 100 MG tablet Take 1 tablet by mouth daily 30 tablet 3    amLODIPine (NORVASC) 5 MG tablet TAKE 1 TABLET BY MOUTH ONE TIME A DAY 30 tablet 1    prasugrel (EFFIENT) 10 MG TABS Take 1 tablet by mouth daily 90 tablet 3    atorvastatin (LIPITOR) 80 MG tablet TAKE 1 TABLET BY MOUTH ONE TIME A DAY AT NIGHT 90 tablet 3    nitroGLYCERIN (NITROSTAT) 0.4 MG SL tablet Place 1 tablet under the tongue every 5 minutes as needed for Chest pain up to max of 3 total doses.  If no relief after 1 dose, call 911. 25 tablet 1    aspirin 81 MG chewable tablet Take 1 tablet by mouth daily 30 tablet 0    tamsulosin (FLOMAX) 0.4 MG capsule Take 0.4 mg by mouth daily      oxymetazoline (12 HOUR NASAL SPRAY) 0.05 % nasal spray 2 sprays by Nasal route 2 times daily 1 each 3    Compression Stockings MISC by Does not apply route (Patient not taking: Reported on 9/15/2021) 1 each 1    levothyroxine (SYNTHROID) 25 MCG tablet Take 25 mcg by mouth Daily       No current facility-administered medications for this visit. No Known Allergies    SUBJECTIVE:   Review of Systems   Constitutional: Negative for activity change, appetite change, diaphoresis and fatigue. Respiratory: Positive for cough (chronic). Negative for shortness of breath. Cardiovascular: Positive for leg swelling. Negative for chest pain and palpitations. Gastrointestinal: Negative for abdominal distention, nausea and vomiting. Neurological: Positive for dizziness (when he stands up). Negative for weakness, light-headedness and headaches. Hematological: Negative for adenopathy. Psychiatric/Behavioral: Negative for sleep disturbance. OBJECTIVE:   Today's Vitals:  /88   Pulse 88   Ht 5' 11\" (1.803 m)   Wt 225 lb (102.1 kg)   SpO2 97%   BMI 31.38 kg/m²     Physical Exam  Vitals reviewed. Constitutional:       General: He is not in acute distress. Appearance: Normal appearance. He is well-developed. He is not diaphoretic. HENT:      Head: Normocephalic and atraumatic. Eyes:      Conjunctiva/sclera: Conjunctivae normal.   Cardiovascular:      Rate and Rhythm: Normal rate and regular rhythm. Heart sounds: Normal heart sounds. No murmur heard. Pulmonary:      Effort: Pulmonary effort is normal. No respiratory distress. Breath sounds: Normal breath sounds. No wheezing or rales. Abdominal:      General: Bowel sounds are normal. There is no distension. Palpations: Abdomen is soft. Tenderness: There is no abdominal tenderness. Musculoskeletal:         General: Normal range of motion. Cervical back: Normal range of motion and neck supple. Right lower leg: Edema present. Left lower leg: Edema present. Skin:     General: Skin is warm and dry. Capillary Refill: Capillary refill takes less than 2 seconds.    Neurological:      Mental Status: He is alert and oriented to person, place, and time. Coordination: Coordination normal.   Psychiatric:         Behavior: Behavior normal.         Wt Readings from Last 3 Encounters:   10/05/21 225 lb (102.1 kg)   10/05/21 225 lb (102.1 kg)   09/20/21 215 lb (97.5 kg)     BP Readings from Last 3 Encounters:   10/05/21 138/88   10/05/21 (!) 151/100   09/20/21 (!) 167/102     Pulse Readings from Last 3 Encounters:   10/05/21 88   10/05/21 91   09/20/21 77     Body mass index is 31.38 kg/m². ECHO:    Summary   Normal left ventricular size and systolic function. There were no regional wall motion abnormalities. Wall thickness was within normal limits. Ejection fraction was estimated at 55-60%. Doppler parameters were consistent with abnormal left ventricular   relaxation (grade 1 diastolic dysfunction). IVC size is within normal limits with normal respiratory phasic changes.       Signature      ----------------------------------------------------------------   Electronically signed by Rikki Witt MD (Interpreting   physician) on 03/10/2021 at 04:59 PM   ----------------------------------------------------------------      CATH/STRESS:         Results reviewed:  BNP: No results found for: BNP  CBC:   Lab Results   Component Value Date    WBC 9.0 09/06/2021    RBC 4.54 09/06/2021    RBC 0 06/08/2021    RBC 4.73 06/08/2021    HGB 15.6 09/06/2021    HCT 45.4 09/06/2021     09/06/2021     CMP:    Lab Results   Component Value Date     09/20/2021    K 4.1 09/20/2021    CL 98 09/20/2021    CO2 26 09/20/2021    BUN 7 09/20/2021    CREATININE 0.8 09/20/2021    LABGLOM >90 09/20/2021    GLUCOSE 113 09/20/2021    GLUCOSE 117 08/23/2021    CALCIUM 8.6 09/20/2021     Hepatic Function Panel:    Lab Results   Component Value Date    ALKPHOS 90 08/24/2021    ALT 46 08/24/2021    AST 37 08/24/2021    PROT 6.6 08/24/2021    BILITOT 0.6 08/24/2021    BILITOT Negative 06/08/2021    LABALBU 4.4 08/24/2021     Magnesium:    Lab Results   Component Value Date    MG 2.1 08/27/2021     PT/INR:    Lab Results   Component Value Date    INR 1.00 01/26/2018     Lipids:    Lab Results   Component Value Date    TRIG 85 06/08/2021    HDL 61 06/08/2021    LDLCALC 39 06/08/2021    LABVLDL 17 06/08/2021       ASSESSMENT AND PLAN:   The patient's condition/symptoms are Stable: No clinical evidence of fluid overload today. Continue current medical regimen without changes at present time. Diagnosis Orders   1. Acute diastolic congestive heart failure, NYHA class 2 ([facility] [address]     2. Bradycardia     3. Hyponatremia       Continue:  GDMT:   ACE/ARB/ARNI - none   BB - none (held for bradycardia at this time)   Diuretic - none, adding 20mg lasix daily  AA - none  SGLT2 -  none  Vasodilator - Nitro  Other - effient, ASA, adding 10 KCl    Tolerating above noted HF meds, no ill side effects noted. Will continue to monitor kidney function and electrolytes. Will optimize as tolerated. Pt is compliant w/ medications. Total visit time of 30 minutes has been spent with patient on education of symptoms, management, medication, and plan of care; as well as review of chart: labs, ECHO, radiology reports, etc.   I personally spent more then 50% of the appt time face to face with the patient. Daily weights  Fluid restriction of 2 Liters per day  Limit sodium in diet to around 1574-0303 mg/day  Monitor BP  Activity as tolerated     Stable, appears Euvolemic  Lab reviewed - stable  Repeat blood work in 2 weeks  BP/HR stable   Adding 20mg lasix daily, adding 10 meq of KCl  Work on sodium intake  Continue daily wts. F/U w/ Cardiology  F/U in clinic in 6 months  Start wearing compression socks      Patient was instructed to call the [facility] for any changes in symptoms as noted in AVS.      Return in about 6 months (around 4/5/2022). or sooner if needed     Patient given educational materials - see patient instructions.    We discussed the importance of weighing oneself and recording daily. We also discussed the importance of a low sodium diet, higher sodium foods to avoid and better low sodium food options. Patient verbalizes understanding of plan of care using teach back method, and is agreeable to the treatment plan.        Electronically signed by RANJAN Garcia CNP on 10/5/2021 at 10:33 AM

## 2021-10-12 ENCOUNTER — OFFICE VISIT (OUTPATIENT)
Dept: ENT CLINIC | Age: 76
End: 2021-10-12
Payer: MEDICARE

## 2021-10-12 VITALS
SYSTOLIC BLOOD PRESSURE: 140 MMHG | TEMPERATURE: 97.6 F | OXYGEN SATURATION: 96 % | DIASTOLIC BLOOD PRESSURE: 82 MMHG | HEIGHT: 71 IN | WEIGHT: 225.5 LBS | HEART RATE: 108 BPM | BODY MASS INDEX: 31.57 KG/M2 | RESPIRATION RATE: 16 BRPM

## 2021-10-12 DIAGNOSIS — J34.2 NASAL SEPTAL DEVIATION: ICD-10-CM

## 2021-10-12 DIAGNOSIS — R04.0 EPISTAXIS: Primary | ICD-10-CM

## 2021-10-12 PROCEDURE — 31231 NASAL ENDOSCOPY DX: CPT | Performed by: OTOLARYNGOLOGY

## 2021-10-12 PROCEDURE — 99213 OFFICE O/P EST LOW 20 MIN: CPT | Performed by: OTOLARYNGOLOGY

## 2021-10-12 NOTE — PROGRESS NOTES
[facility]    HPI   It was a pleasure to see Emmanuel Lazo, a 78 y.o. male, who returns today for epistaxis. No further bleeding. He has been using nasal saline spray. The review of systems, past medical, past surgical, social, and family history were updated in the medical chart and reviewed today. No significant changes were noted. Objective     Vitals:    10/12/21 1012   BP: (!) 140/82   Pulse: 108   Resp: 16   Temp: 97.6 °F (36.4 °C)   SpO2: 96%       PHYSICAL EXAM FINDINGS:  GENERAL: Awake, NAD, Non-toxic appearing. Patient is alert and oriented toperson, place and time. No respiratory distress. No nasal voice, no hoarseness. Not obviously hearing impaired. NEUROLOGICAL: GCS 15, Cranial nerves II-VI, VIII-XII grossly intact,  Facial nerve (VII) w/ House-Brackman Grade 1 of 6 bilaterally, No evidence of nystagmus or gross asymmetry    PSYCHIATRIC: Mood and Affect appropriate. HEAD: NC/AT  SKIN: No overtly ulcerated, cellulitic, or indurated lesions of the head or neck. EARS: Pinna well-formed  NOSE: Dorsum w/o scar or deformity  ORAL CAVITY: No mucosal masses/lesions appreciated,    OROPHARYNX: No mucosal masses or lesions appreciated. NECK: Soft, supple. No crepitus or masses appreciated,  Trachea midline. CHEST: Breathing is unlabored without retraction    Procedure:   Procedure performed: Rigid Sinonasal Endoscopy [id number]  Attending: Dagmar Roque MD  Anesthesia: Topical lidocaine 4% with oxymetazoline  Blood loss: None  Specimens and Cultures: None  Findings:    Right: The nasal passageway was patent. The septum was inflamedwith prominent maxillary crest deformity. The inferior turbinate was unremarkable. The middle meatus was patent. The sphenoethmoidal recess was unremarkable. The nasopharynx was unremarkable. Left: The nasal passageway was patent. The septum was unremarkable. The inferior turbinate was unremarkable. The middle meatus was patent.   The

## 2021-10-19 ENCOUNTER — TELEPHONE (OUTPATIENT)
Dept: CARDIOLOGY CLINIC | Age: 76
End: 2021-10-19

## 2021-10-19 NOTE — TELEPHONE ENCOUNTER
----- Message from RANJAN Erwin CNP sent at 10/5/2021 10:42 AM EDT -----  Regarding: weight check  Call for weight log, started on 20mg of Lasix daily    Thank you!

## 2021-10-21 NOTE — TELEPHONE ENCOUNTER
Spoke with patient   He is not weighing  Discussed at length importance of daily wts and keeping wt log   Patient denies [place], bloating,   Has SOB but states this is chronic with his COPD not any worse than his \"normal\"

## 2021-10-26 ENCOUNTER — PROCEDURE VISIT (OUTPATIENT)
Dept: CARDIOLOGY CLINIC | Age: 76
End: 2021-10-26
Payer: MEDICARE

## 2021-10-26 DIAGNOSIS — R55 SYNCOPE, UNSPECIFIED SYNCOPE TYPE: Primary | ICD-10-CM

## 2021-10-26 PROCEDURE — 93298 REM INTERROG DEV EVAL SCRMS: CPT | Performed by: INTERNAL MEDICINE

## 2021-10-26 PROCEDURE — G2066 INTER DEVC REMOTE 30D: HCPCS | Performed by: INTERNAL MEDICINE

## 2021-10-26 NOTE — PROGRESS NOTES
[facility] [employer]   Patient of Alejandra    History of syncope    Battery okay    Episodes:none

## 2021-11-04 LAB
ANION GAP SERPL CALCULATED.3IONS-SCNC: 11 MMOL/L (ref 5–15)
B-TYPE NATRIURETIC PEPTIDE: 94 PG/ML
B-TYPE NATRIURETIC PEPTIDE: 94 PG/ML
BUN BLDV-MCNC: 9 MG/DL
BUN BLDV-MCNC: 9 MG/DL (ref 5–27)
CALCIUM SERPL-MCNC: 9 MG/DL
CALCIUM SERPL-MCNC: 9 MG/DL (ref 8.5–10.5)
CHLORIDE BLD-SCNC: 98 MMOL/L
CHLORIDE BLD-SCNC: 98 MMOL/L (ref 98–109)
CO2: 27 MMOL/L
CO2: 27 MMOL/L (ref 22–32)
CREAT SERPL-MCNC: 0.94 MG/DL
CREAT SERPL-MCNC: 0.94 MG/DL (ref 0.6–1.3)
EGFR AFRICAN AMERICAN: >60 ML/MIN/1.73SQ.M
EGFR IF NONAFRICAN AMERICAN: >60 ML/MIN/1.73SQ.M
GFR CALCULATED: >60
GLUCOSE BLD-MCNC: 107 MG/DL
GLUCOSE: 107 MG/DL (ref 65–99)
MAGNESIUM: 2 MG/DL
MAGNESIUM: 2 MG/DL (ref 1.8–2.6)
POTASSIUM SERPL-SCNC: 4.1 MMOL/L
POTASSIUM SERPL-SCNC: 4.1 MMOL/L (ref 3.5–5)
SODIUM BLD-SCNC: 136 MMOL/L
SODIUM BLD-SCNC: 136 MMOL/L (ref 134–146)

## 2021-11-30 ENCOUNTER — PROCEDURE VISIT (OUTPATIENT)
Dept: CARDIOLOGY CLINIC | Age: 76
End: 2021-11-30
Payer: MEDICARE

## 2021-11-30 DIAGNOSIS — R55 SYNCOPE, UNSPECIFIED SYNCOPE TYPE: Primary | ICD-10-CM

## 2021-11-30 PROCEDURE — G2066 INTER DEVC REMOTE 30D: HCPCS | Performed by: INTERNAL MEDICINE

## 2021-11-30 PROCEDURE — 93298 REM INTERROG DEV EVAL SCRMS: CPT | Performed by: INTERNAL MEDICINE

## 2022-01-04 ENCOUNTER — PROCEDURE VISIT (OUTPATIENT)
Dept: CARDIOLOGY CLINIC | Age: 77
End: 2022-01-04
Payer: MEDICARE

## 2022-01-04 DIAGNOSIS — R55 SYNCOPE, UNSPECIFIED SYNCOPE TYPE: Primary | ICD-10-CM

## 2022-01-04 PROCEDURE — 93298 REM INTERROG DEV EVAL SCRMS: CPT | Performed by: INTERNAL MEDICINE

## 2022-01-04 PROCEDURE — G2066 INTER DEVC REMOTE 30D: HCPCS | Performed by: INTERNAL MEDICINE

## 2022-01-09 LAB
ANION GAP SERPL CALCULATED.3IONS-SCNC: 10 MMOL/L (ref 5–15)
BUN BLDV-MCNC: 8 MG/DL (ref 5–27)
CALCIUM SERPL-MCNC: 9.1 MG/DL (ref 8.5–10.5)
CHLORIDE BLD-SCNC: 99 MMOL/L (ref 98–109)
CO2: 27 MMOL/L (ref 22–32)
CREAT SERPL-MCNC: 0.84 MG/DL (ref 0.6–1.3)
EGFR AFRICAN AMERICAN: >60 ML/MIN/1.73SQ.M
EGFR IF NONAFRICAN AMERICAN: >60 ML/MIN/1.73SQ.M
GLUCOSE: 108 MG/DL (ref 65–99)
POTASSIUM SERPL-SCNC: 4.4 MMOL/L (ref 3.5–5)
SODIUM BLD-SCNC: 136 MMOL/L (ref 134–146)

## 2022-01-18 RX ORDER — ATORVASTATIN CALCIUM 80 MG/1
TABLET, FILM COATED ORAL
Qty: 90 TABLET | Refills: 3 | Status: SHIPPED | OUTPATIENT
Start: 2022-01-18

## 2022-02-08 ENCOUNTER — PROCEDURE VISIT (OUTPATIENT)
Dept: CARDIOLOGY CLINIC | Age: 77
End: 2022-02-08
Payer: MEDICARE

## 2022-02-08 DIAGNOSIS — R55 SYNCOPE, UNSPECIFIED SYNCOPE TYPE: Primary | ICD-10-CM

## 2022-02-08 PROCEDURE — 93298 REM INTERROG DEV EVAL SCRMS: CPT | Performed by: INTERNAL MEDICINE

## 2022-02-08 PROCEDURE — G2066 INTER DEVC REMOTE 30D: HCPCS | Performed by: INTERNAL MEDICINE

## 2022-03-10 ENCOUNTER — OFFICE VISIT (OUTPATIENT)
Dept: NEPHROLOGY | Age: 77
End: 2022-03-10
Payer: MEDICARE

## 2022-03-10 ENCOUNTER — TELEPHONE (OUTPATIENT)
Dept: NEPHROLOGY | Age: 77
End: 2022-03-10

## 2022-03-10 VITALS
DIASTOLIC BLOOD PRESSURE: 103 MMHG | HEART RATE: 82 BPM | OXYGEN SATURATION: 98 % | WEIGHT: 231 LBS | TEMPERATURE: 98.1 F | BODY MASS INDEX: 32.22 KG/M2 | SYSTOLIC BLOOD PRESSURE: 163 MMHG

## 2022-03-10 DIAGNOSIS — I10 PRIMARY HYPERTENSION: ICD-10-CM

## 2022-03-10 DIAGNOSIS — E87.1 HYPONATREMIA: Primary | ICD-10-CM

## 2022-03-10 DIAGNOSIS — E03.9 ACQUIRED HYPOTHYROIDISM: ICD-10-CM

## 2022-03-10 DIAGNOSIS — R91.1 NODULE OF LEFT LUNG: ICD-10-CM

## 2022-03-10 PROCEDURE — 99213 OFFICE O/P EST LOW 20 MIN: CPT | Performed by: INTERNAL MEDICINE

## 2022-03-10 RX ORDER — CALCIUM CARBONATE/VITAMIN D3 600 MG-10
TABLET ORAL
COMMUNITY
Start: 2022-01-18

## 2022-03-10 RX ORDER — ESCITALOPRAM OXALATE 10 MG/1
TABLET ORAL
COMMUNITY
Start: 2021-12-08

## 2022-03-10 NOTE — TELEPHONE ENCOUNTER
I called [phone] for prior auth for CT scan. Spoke w/[employer]. Ref#:  [id number]. Clinicals faxed to [phone]. They state it could be 2 business day before we hear from them.

## 2022-03-10 NOTE — PROGRESS NOTES
Renal Progress Note    Assessment and Plan:      Diagnosis Orders   1. Hyponatremia     2. Primary hypertension     3. Acquired hypothyroidism     4. Nodule of left lung           PLAN:  1. Lab results reviewed with the patient. 2. He understood. 3. We went through the report together in epic  4. Serum sodium is low end of normal at 135 mcg/L  5. Reviewed previous aCT scan of the lung for left lung lesion  6. We will do a follow-up CAT scan next week  7. Discussed with the patient  8. Medications reviewed  9. No changes. 10. I discussed  cigarette cessation with him  11. Return visit in 3 months with labs            Patient Active Problem List   Diagnosis    Precordial pain    Angina at rest [facility])    S/P cardiac cath: 1/25/2018: 70-75% mid-LAD. LCX OK. RCA 20%. LVEF 454-50%. LVEDP 12 mmHg.  S/P PTCA: 1/25/2018: FFR-guided stenting of mid-LAD Xience 2.75x28 mm, post-dilated to 3.78 mm proximally.  Chronic obstructive pulmonary disease, unspecified ([address]    Hyponatremia    Fatigue    Smoker unmotivated to quit    Cavitary lesion of lung    Bradycardia           Subjective:   Chief complaint:  Chief Complaint   Patient presents with    Other     Hyponatremia      HPI:This is a follow up visit for Ms. Eloina Jimenez who is here today for return appointment. I see him for hyponatremia. Was last seen October 2021. Doing well since then. No specific complaint. Blood pressure is high in the office today but is normal at home. He also was seen by different physician then also this morning and blood pressure was normal.  No chest pain or shortness of breath. .  No fever or chills. .  No headaches. He has a long history of cigarette smoking. ROS:  Pertinent positives stated above in HPI. All other systems were reviewed and were negative.   Medications:     Current Outpatient Medications   Medication Sig Dispense Refill    escitalopram (LEXAPRO) 10 MG tablet TAKE 1 TABLET BY MOUTH EVERY DAY      Thiamine Mononitrate (B1) 100 MG TABS TAKE 1 TABLET BY MOUTH EVERY DAY      atorvastatin (LIPITOR) 80 MG tablet TAKE 1 TABLET BY MOUTH EVERY DAY AT NIGHT 90 tablet 3    furosemide (LASIX) 20 MG tablet Take 1 tablet by mouth daily 90 tablet 3    potassium chloride (KLOR-CON M) 10 MEQ extended release tablet Take 1 tablet by mouth daily 90 tablet 3    nitroGLYCERIN (NITROSTAT) 0.4 MG SL tablet Place 1 tablet under the tongue every 5 minutes as needed for Chest pain up to max of 3 total doses. If no relief after 1 dose, call 911. 25 tablet 1    oxymetazoline (12 HOUR NASAL SPRAY) 0.05 % nasal spray 2 sprays by Nasal route 2 times daily 1 each 3    folic acid (FOLVITE) 1 MG tablet Take 1 tablet by mouth daily 30 tablet 3    sodium chloride 1 g tablet Take 1 tablet by mouth 2 times daily (with meals) 90 tablet 3    amLODIPine (NORVASC) 5 MG tablet TAKE 1 TABLET BY MOUTH ONE TIME A DAY 30 tablet 1    prasugrel (EFFIENT) 10 MG TABS Take 1 tablet by mouth daily 90 tablet 3    aspirin 81 MG chewable tablet Take 1 tablet by mouth daily 30 tablet 0    levothyroxine (SYNTHROID) 25 MCG tablet Take 25 mcg by mouth Daily      tamsulosin (FLOMAX) 0.4 MG capsule Take 0.4 mg by mouth daily      Blood Pressure Monitoring (BLOOD PRESSURE MONITOR AUTOMAT) MARIANN 1 applicator by Does not apply route daily (Patient not taking: Reported on 3/10/2022) 1 each 0     No current facility-administered medications for this visit.        Lab Results:    CBC:   Lab Results   Component Value Date    WBC <1 11/04/2021    HGB 15.6 09/06/2021    HCT 45.4 09/06/2021    .0 (H) 09/06/2021     09/06/2021     BMP:    Lab Results   Component Value Date     01/08/2022     01/08/2022     11/04/2021    K 4.2 01/08/2022    K 4.4 01/08/2022    K 4.1 11/04/2021    CL 98 01/08/2022    CL 99 01/08/2022    CL 98 11/04/2021    CO2 27 01/08/2022    CO2 27 01/08/2022    CO2 27 11/04/2021    BUN 8 01/08/2022    BUN 8 01/08/2022 BUN 9 11/04/2021    CREATININE 0.82 01/08/2022    CREATININE 0.84 01/08/2022    CREATININE 0.94 11/04/2021    GLUCOSE 108 (H) 01/08/2022    GLUCOSE Negative 01/08/2022    GLUCOSE 108 (H) 01/08/2022      Hepatic:   Lab Results   Component Value Date    AST 46 (H) 01/08/2022    AST 37 08/24/2021    AST 27 06/08/2021    ALT 51 (H) 01/08/2022    ALT 46 08/24/2021    ALT 20 06/08/2021    BILITOT 0.6 01/08/2022    BILITOT Negative 01/08/2022    BILITOT Negative 11/04/2021    ALKPHOS 83 01/08/2022    ALKPHOS 90 08/24/2021    ALKPHOS 73 06/08/2021     BNP:   Lab Results   Component Value Date    BNP 94 11/04/2021    BNP 94 11/04/2021     Lipids:   Lab Results   Component Value Date    CHOL 117 (L) 06/08/2021    HDL 61 06/08/2021     INR:   Lab Results   Component Value Date    INR 1.00 01/26/2018     URINE:   Lab Results   Component Value Date    NAUR 30 08/24/2021    PROTUR Negative 01/08/2022     Lab Results   Component Value Date    NITRU Negative 01/08/2022    COLORU YELLOW 01/08/2022    PHUR 6.0 12/08/2017    SPECGRAV 1.011 12/08/2017    LEUKOCYTESUR Negative 01/08/2022    UROBILINOGEN <1.1 01/08/2022    BILIRUBINUR Negative 08/12/2020    BLOODU Negative 08/12/2020    BLOODU NEGATIVE 12/08/2017    GLUCOSEU Negative 08/12/2020    KETUA Negative 01/08/2022      Microalbumen/Creatinine ratio:  No components found for: RUCREAT    Objective:   Vitals: BP (!) 163/103 (Site: Left Upper Arm, Position: Sitting, Cuff Size: Large Adult)   Pulse 82   Temp 98.1 °F (36.7 °C)   Wt 231 lb (104.8 kg)   SpO2 98%   BMI 32.22 kg/m²      Constitutional:  Alert, awake, no apparent distress  Skin:normal with no rash or any significant lesions  HEENT:Pupils are reactive . Throat is clear.   Oral mucosa is moist.  Neck:supple with no thyromegaly, JVD, lymphadenopathy or bruit   Cardiovascular: Regular sinus rhythm without murmur, rubs or gallops   Respiratory: Decreased breath sound  Abdomen: Good bowel sound, soft, non tender and no bruit  Ext: No LE edema on the left. 1+ edema on the right. No calf tenderness. Musculoskeletal:Intact  Neuro:Alert, awake and oriented with no obvious focal deficit. Speech is normal.    Electronically signed by Breana Garcia MD on 3/10/2022 at 3:12 PM   **This report has been created using voice recognition software. It maycontain minor  errors which are inherent in voice recognition technology. **

## 2022-03-15 ENCOUNTER — PROCEDURE VISIT (OUTPATIENT)
Dept: CARDIOLOGY CLINIC | Age: 77
End: 2022-03-15
Payer: MEDICARE

## 2022-03-15 DIAGNOSIS — R55 SYNCOPE, UNSPECIFIED SYNCOPE TYPE: Primary | ICD-10-CM

## 2022-03-15 DIAGNOSIS — R91.1 LUNG NODULE: Primary | ICD-10-CM

## 2022-03-15 PROCEDURE — G2066 INTER DEVC REMOTE 30D: HCPCS | Performed by: INTERNAL MEDICINE

## 2022-03-15 PROCEDURE — 93298 REM INTERROG DEV EVAL SCRMS: CPT | Performed by: INTERNAL MEDICINE

## 2022-03-15 NOTE — TELEPHONE ENCOUNTER
I called pt and informed him of this. He states he is going to reschedule to a later time. He was given the number.

## 2022-03-15 NOTE — LETTER
[address] [place] [id number]  Phone: [phone]  Fax: [phone]          March 15, 2022    [address] [place] [address] Road [facility]      Dear Mendoza Garza:    I received your loop recorder home download on 3/15/22. Your next scheduled, automatic, download will be on 4/19/22. Your loop recorder was implanted for history of syncope. Our findings:     No new issues       Our office will ALWAYS contact you BY PHONE if we determined you have a new, abnormal heart rhythm. Please update contact information if it changes! If you need help with your monitor, please contact [employer] at [phone]. Thank You!   Cecilia Dan

## 2022-04-05 ENCOUNTER — TELEPHONE (OUTPATIENT)
Dept: CARDIOLOGY CLINIC | Age: 77
End: 2022-04-05

## 2022-04-05 NOTE — TELEPHONE ENCOUNTER
Spoke with patient per phytel wanted to cancel appointment for today 4/5  He would like called after 2 today to r/s  He is not where he can write down appointment

## 2022-04-18 NOTE — TELEPHONE ENCOUNTER
Patient LMOM to cancel appt on 4/19 d/t still not feeling well, said he will call back on his own time to r/s when feeling better. Patient also overdue for labs, has been reminded. Lab slips printed and mailed.

## 2022-04-19 ENCOUNTER — PROCEDURE VISIT (OUTPATIENT)
Dept: CARDIOLOGY CLINIC | Age: 77
End: 2022-04-19
Payer: MEDICARE

## 2022-04-19 DIAGNOSIS — R55 SYNCOPE, UNSPECIFIED SYNCOPE TYPE: Primary | ICD-10-CM

## 2022-04-19 PROCEDURE — G2066 INTER DEVC REMOTE 30D: HCPCS | Performed by: INTERNAL MEDICINE

## 2022-04-19 PROCEDURE — 93298 REM INTERROG DEV EVAL SCRMS: CPT | Performed by: INTERNAL MEDICINE

## 2022-05-24 ENCOUNTER — PROCEDURE VISIT (OUTPATIENT)
Dept: CARDIOLOGY CLINIC | Age: 77
End: 2022-05-24
Payer: MEDICARE

## 2022-05-24 DIAGNOSIS — R55 SYNCOPE, UNSPECIFIED SYNCOPE TYPE: Primary | ICD-10-CM

## 2022-05-24 PROCEDURE — 93298 REM INTERROG DEV EVAL SCRMS: CPT | Performed by: INTERNAL MEDICINE

## 2022-05-24 PROCEDURE — G2066 INTER DEVC REMOTE 30D: HCPCS | Performed by: INTERNAL MEDICINE

## 2022-06-28 ENCOUNTER — PROCEDURE VISIT (OUTPATIENT)
Dept: CARDIOLOGY CLINIC | Age: 77
End: 2022-06-28
Payer: MEDICARE

## 2022-06-28 DIAGNOSIS — R55 SYNCOPE, UNSPECIFIED SYNCOPE TYPE: Primary | ICD-10-CM

## 2022-06-28 PROCEDURE — 93298 REM INTERROG DEV EVAL SCRMS: CPT | Performed by: INTERNAL MEDICINE

## 2022-06-28 PROCEDURE — G2066 INTER DEVC REMOTE 30D: HCPCS | Performed by: INTERNAL MEDICINE

## 2022-07-20 RX ORDER — FUROSEMIDE 20 MG/1
TABLET ORAL
Qty: 90 TABLET | Refills: 0 | Status: SHIPPED | OUTPATIENT
Start: 2022-07-20

## 2022-08-04 ENCOUNTER — PROCEDURE VISIT (OUTPATIENT)
Dept: CARDIOLOGY CLINIC | Age: 77
End: 2022-08-04
Payer: MEDICARE

## 2022-08-04 DIAGNOSIS — R55 SYNCOPE, UNSPECIFIED SYNCOPE TYPE: Primary | ICD-10-CM

## 2022-08-04 PROCEDURE — G2066 INTER DEVC REMOTE 30D: HCPCS | Performed by: INTERNAL MEDICINE

## 2022-08-04 PROCEDURE — 93298 REM INTERROG DEV EVAL SCRMS: CPT | Performed by: INTERNAL MEDICINE

## 2022-09-13 ENCOUNTER — PROCEDURE VISIT (OUTPATIENT)
Dept: CARDIOLOGY CLINIC | Age: 77
End: 2022-09-13
Payer: MEDICARE

## 2022-09-13 DIAGNOSIS — R55 SYNCOPE, UNSPECIFIED SYNCOPE TYPE: Primary | ICD-10-CM

## 2022-09-13 PROCEDURE — 93298 REM INTERROG DEV EVAL SCRMS: CPT | Performed by: INTERNAL MEDICINE

## 2022-09-13 PROCEDURE — G2066 INTER DEVC REMOTE 30D: HCPCS | Performed by: INTERNAL MEDICINE

## 2022-10-18 ENCOUNTER — PROCEDURE VISIT (OUTPATIENT)
Dept: CARDIOLOGY CLINIC | Age: 77
End: 2022-10-18
Payer: MEDICARE

## 2022-10-18 DIAGNOSIS — R55 SYNCOPE, UNSPECIFIED SYNCOPE TYPE: Primary | ICD-10-CM

## 2022-10-18 PROCEDURE — 93298 REM INTERROG DEV EVAL SCRMS: CPT | Performed by: INTERNAL MEDICINE

## 2022-10-18 PROCEDURE — G2066 INTER DEVC REMOTE 30D: HCPCS | Performed by: INTERNAL MEDICINE

## 2022-10-18 RX ORDER — POTASSIUM CHLORIDE 750 MG/1
TABLET, EXTENDED RELEASE ORAL
Qty: 90 TABLET | Refills: 0 | OUTPATIENT
Start: 2022-10-18

## 2022-11-22 ENCOUNTER — PROCEDURE VISIT (OUTPATIENT)
Dept: CARDIOLOGY CLINIC | Age: 77
End: 2022-11-22
Payer: MEDICARE

## 2022-11-22 DIAGNOSIS — R55 SYNCOPE, UNSPECIFIED SYNCOPE TYPE: Primary | ICD-10-CM

## 2022-11-22 PROCEDURE — G2066 INTER DEVC REMOTE 30D: HCPCS | Performed by: INTERNAL MEDICINE

## 2022-11-22 PROCEDURE — 93298 REM INTERROG DEV EVAL SCRMS: CPT | Performed by: INTERNAL MEDICINE

## 2022-11-27 ENCOUNTER — APPOINTMENT (OUTPATIENT)
Dept: CT IMAGING | Age: 77
DRG: 190 | End: 2022-11-27
Payer: MEDICARE

## 2022-11-27 ENCOUNTER — APPOINTMENT (OUTPATIENT)
Dept: GENERAL RADIOLOGY | Age: 77
DRG: 190 | End: 2022-11-27
Payer: MEDICARE

## 2022-11-27 ENCOUNTER — HOSPITAL ENCOUNTER (INPATIENT)
Age: 77
LOS: 1 days | Discharge: HOME | DRG: 190 | End: 2022-11-28
Attending: EMERGENCY MEDICINE | Admitting: INTERNAL MEDICINE
Payer: MEDICARE

## 2022-11-27 DIAGNOSIS — E87.20 LACTIC ACIDOSIS: Primary | ICD-10-CM

## 2022-11-27 DIAGNOSIS — R53.1 GENERALIZED WEAKNESS: ICD-10-CM

## 2022-11-27 DIAGNOSIS — R06.03 ACUTE RESPIRATORY DISTRESS: ICD-10-CM

## 2022-11-27 DIAGNOSIS — R09.02 HYPOXIA: ICD-10-CM

## 2022-11-27 LAB
ANION GAP SERPL CALCULATED.3IONS-SCNC: 15 MEQ/L (ref 8–16)
BACTERIA: ABNORMAL /HPF
BASOPHILS # BLD: 0.4 %
BASOPHILS ABSOLUTE: 0 THOU/MM3 (ref 0–0.1)
BILIRUBIN URINE: NEGATIVE
BLOOD, URINE: NEGATIVE
BUN BLDV-MCNC: 11 MG/DL (ref 7–22)
CALCIUM SERPL-MCNC: 8.9 MG/DL (ref 8.5–10.5)
CASTS 2: ABNORMAL /LPF
CASTS UA: ABNORMAL /LPF
CHARACTER, URINE: CLEAR
CHLORIDE BLD-SCNC: 94 MEQ/L (ref 98–111)
CO2: 22 MEQ/L (ref 23–33)
COLOR: YELLOW
CREAT SERPL-MCNC: 1.2 MG/DL (ref 0.4–1.2)
CRYSTALS, UA: ABNORMAL
EKG ATRIAL RATE: 96 BPM
EKG P AXIS: 61 DEGREES
EKG P-R INTERVAL: 208 MS
EKG Q-T INTERVAL: 350 MS
EKG QRS DURATION: 112 MS
EKG QTC CALCULATION (BAZETT): 442 MS
EKG R AXIS: -81 DEGREES
EKG T AXIS: 43 DEGREES
EKG VENTRICULAR RATE: 96 BPM
EOSINOPHIL # BLD: 0.7 %
EOSINOPHILS ABSOLUTE: 0.1 THOU/MM3 (ref 0–0.4)
EPITHELIAL CELLS, UA: ABNORMAL /HPF
ERYTHROCYTE [DISTWIDTH] IN BLOOD BY AUTOMATED COUNT: 12.9 % (ref 11.5–14.5)
ERYTHROCYTE [DISTWIDTH] IN BLOOD BY AUTOMATED COUNT: 46.4 FL (ref 35–45)
GFR SERPL CREATININE-BSD FRML MDRD: > 60 ML/MIN/1.73M2
GLUCOSE BLD-MCNC: 134 MG/DL (ref 70–108)
GLUCOSE URINE: NEGATIVE MG/DL
HCT VFR BLD CALC: 49.9 % (ref 42–52)
HEMOGLOBIN: 17.8 GM/DL (ref 14–18)
IMMATURE GRANS (ABS): 0.03 THOU/MM3 (ref 0–0.07)
IMMATURE GRANULOCYTES: 0.4 %
INFLUENZA A: NOT DETECTED
INFLUENZA B: NOT DETECTED
KETONES, URINE: ABNORMAL
LACTIC ACID, SEPSIS: 2.7 MMOL/L (ref 0.5–1.9)
LACTIC ACID, SEPSIS: 4.6 MMOL/L (ref 0.5–1.9)
LACTIC ACID: 4.4 MMOL/L (ref 0.5–2)
LACTIC ACID: 4.6 MMOL/L (ref 0.5–2)
LACTIC ACID: 4.8 MMOL/L (ref 0.5–2)
LEUKOCYTE ESTERASE, URINE: ABNORMAL
LYMPHOCYTES # BLD: 23.2 %
LYMPHOCYTES ABSOLUTE: 1.7 THOU/MM3 (ref 1–4.8)
MCH RBC QN AUTO: 34.8 PG (ref 26–33)
MCHC RBC AUTO-ENTMCNC: 35.7 GM/DL (ref 32.2–35.5)
MCV RBC AUTO: 97.5 FL (ref 80–94)
MISCELLANEOUS 2: ABNORMAL
MONOCYTES # BLD: 9.7 %
MONOCYTES ABSOLUTE: 0.7 THOU/MM3 (ref 0.4–1.3)
NITRITE, URINE: NEGATIVE
NUCLEATED RED BLOOD CELLS: 0 /100 WBC
OSMOLALITY CALCULATION: 264 MOSMOL/KG (ref 275–300)
PH UA: 7.5 (ref 5–9)
PLATELET # BLD: 146 THOU/MM3 (ref 130–400)
PMV BLD AUTO: 9.5 FL (ref 9.4–12.4)
POTASSIUM REFLEX MAGNESIUM: 3.7 MEQ/L (ref 3.5–5.2)
PRO-BNP: 132.4 PG/ML (ref 0–1800)
PROCALCITONIN: 0.02 NG/ML (ref 0.01–0.09)
PROTEIN UA: NEGATIVE
RBC # BLD: 5.12 MILL/MM3 (ref 4.7–6.1)
RBC URINE: ABNORMAL /HPF
RENAL EPITHELIAL, UA: ABNORMAL
SARS-COV-2 RNA, RT PCR: NOT DETECTED
SEG NEUTROPHILS: 65.6 %
SEGMENTED NEUTROPHILS ABSOLUTE COUNT: 4.7 THOU/MM3 (ref 1.8–7.7)
SODIUM BLD-SCNC: 131 MEQ/L (ref 135–145)
SPECIFIC GRAVITY, URINE: 1.01 (ref 1–1.03)
TROPONIN T: < 0.01 NG/ML
UROBILINOGEN, URINE: 1 EU/DL (ref 0–1)
WBC # BLD: 7.2 THOU/MM3 (ref 4.8–10.8)
WBC UA: ABNORMAL /HPF
YEAST: ABNORMAL

## 2022-11-27 PROCEDURE — 71045 X-RAY EXAM CHEST 1 VIEW: CPT

## 2022-11-27 PROCEDURE — 84145 PROCALCITONIN (PCT): CPT

## 2022-11-27 PROCEDURE — 84484 ASSAY OF TROPONIN QUANT: CPT

## 2022-11-27 PROCEDURE — 96365 THER/PROPH/DIAG IV INF INIT: CPT

## 2022-11-27 PROCEDURE — 85025 COMPLETE CBC W/AUTO DIFF WBC: CPT

## 2022-11-27 PROCEDURE — 81001 URINALYSIS AUTO W/SCOPE: CPT

## 2022-11-27 PROCEDURE — 2580000003 HC RX 258: Performed by: STUDENT IN AN ORGANIZED HEALTH CARE EDUCATION/TRAINING PROGRAM

## 2022-11-27 PROCEDURE — G0378 HOSPITAL OBSERVATION PER HR: HCPCS

## 2022-11-27 PROCEDURE — 80048 BASIC METABOLIC PNL TOTAL CA: CPT

## 2022-11-27 PROCEDURE — 94640 AIRWAY INHALATION TREATMENT: CPT

## 2022-11-27 PROCEDURE — 2700000000 HC OXYGEN THERAPY PER DAY

## 2022-11-27 PROCEDURE — 87040 BLOOD CULTURE FOR BACTERIA: CPT

## 2022-11-27 PROCEDURE — 74177 CT ABD & PELVIS W/CONTRAST: CPT

## 2022-11-27 PROCEDURE — 93005 ELECTROCARDIOGRAM TRACING: CPT | Performed by: STUDENT IN AN ORGANIZED HEALTH CARE EDUCATION/TRAINING PROGRAM

## 2022-11-27 PROCEDURE — 6370000000 HC RX 637 (ALT 250 FOR IP): Performed by: STUDENT IN AN ORGANIZED HEALTH CARE EDUCATION/TRAINING PROGRAM

## 2022-11-27 PROCEDURE — 94761 N-INVAS EAR/PLS OXIMETRY MLT: CPT

## 2022-11-27 PROCEDURE — 99285 EMERGENCY DEPT VISIT HI MDM: CPT

## 2022-11-27 PROCEDURE — 96361 HYDRATE IV INFUSION ADD-ON: CPT

## 2022-11-27 PROCEDURE — 6370000000 HC RX 637 (ALT 250 FOR IP): Performed by: INTERNAL MEDICINE

## 2022-11-27 PROCEDURE — 6360000004 HC RX CONTRAST MEDICATION: Performed by: STUDENT IN AN ORGANIZED HEALTH CARE EDUCATION/TRAINING PROGRAM

## 2022-11-27 PROCEDURE — 2140000000 HC CCU INTERMEDIATE R&B

## 2022-11-27 PROCEDURE — 83605 ASSAY OF LACTIC ACID: CPT

## 2022-11-27 PROCEDURE — 93010 ELECTROCARDIOGRAM REPORT: CPT | Performed by: INTERNAL MEDICINE

## 2022-11-27 PROCEDURE — 96375 TX/PRO/DX INJ NEW DRUG ADDON: CPT

## 2022-11-27 PROCEDURE — 87636 SARSCOV2 & INF A&B AMP PRB: CPT

## 2022-11-27 PROCEDURE — 36415 COLL VENOUS BLD VENIPUNCTURE: CPT

## 2022-11-27 PROCEDURE — 83880 ASSAY OF NATRIURETIC PEPTIDE: CPT

## 2022-11-27 PROCEDURE — 2580000003 HC RX 258: Performed by: INTERNAL MEDICINE

## 2022-11-27 PROCEDURE — 6360000002 HC RX W HCPCS: Performed by: STUDENT IN AN ORGANIZED HEALTH CARE EDUCATION/TRAINING PROGRAM

## 2022-11-27 RX ORDER — SODIUM CHLORIDE 9 MG/ML
INJECTION, SOLUTION INTRAVENOUS PRN
Status: DISCONTINUED | OUTPATIENT
Start: 2022-11-27 | End: 2022-11-28 | Stop reason: HOSPADM

## 2022-11-27 RX ORDER — LORAZEPAM 1 MG/1
1 TABLET ORAL
Status: DISCONTINUED | OUTPATIENT
Start: 2022-11-27 | End: 2022-11-28 | Stop reason: HOSPADM

## 2022-11-27 RX ORDER — LORAZEPAM 2 MG/ML
2 INJECTION INTRAMUSCULAR
Status: DISCONTINUED | OUTPATIENT
Start: 2022-11-27 | End: 2022-11-28 | Stop reason: HOSPADM

## 2022-11-27 RX ORDER — IPRATROPIUM BROMIDE AND ALBUTEROL SULFATE 2.5; .5 MG/3ML; MG/3ML
1 SOLUTION RESPIRATORY (INHALATION) 2 TIMES DAILY
Status: DISCONTINUED | OUTPATIENT
Start: 2022-11-28 | End: 2022-11-28 | Stop reason: HOSPADM

## 2022-11-27 RX ORDER — ENOXAPARIN SODIUM 100 MG/ML
40 INJECTION SUBCUTANEOUS DAILY
Status: DISCONTINUED | OUTPATIENT
Start: 2022-11-28 | End: 2022-11-28 | Stop reason: HOSPADM

## 2022-11-27 RX ORDER — AMLODIPINE BESYLATE 5 MG/1
5 TABLET ORAL DAILY
Status: CANCELLED | OUTPATIENT
Start: 2022-11-27

## 2022-11-27 RX ORDER — METHYLPREDNISOLONE SODIUM SUCCINATE 125 MG/2ML
125 INJECTION, POWDER, LYOPHILIZED, FOR SOLUTION INTRAMUSCULAR; INTRAVENOUS ONCE
Status: COMPLETED | OUTPATIENT
Start: 2022-11-27 | End: 2022-11-27

## 2022-11-27 RX ORDER — ALBUTEROL SULFATE 2.5 MG/3ML
2.5 SOLUTION RESPIRATORY (INHALATION)
Status: COMPLETED | OUTPATIENT
Start: 2022-11-27 | End: 2022-11-27

## 2022-11-27 RX ORDER — LORAZEPAM 2 MG/ML
3 INJECTION INTRAMUSCULAR
Status: DISCONTINUED | OUTPATIENT
Start: 2022-11-27 | End: 2022-11-28 | Stop reason: HOSPADM

## 2022-11-27 RX ORDER — IPRATROPIUM BROMIDE AND ALBUTEROL SULFATE 2.5; .5 MG/3ML; MG/3ML
1 SOLUTION RESPIRATORY (INHALATION) EVERY 6 HOURS PRN
Status: DISCONTINUED | OUTPATIENT
Start: 2022-11-27 | End: 2022-11-28 | Stop reason: HOSPADM

## 2022-11-27 RX ORDER — LORAZEPAM 2 MG/ML
4 INJECTION INTRAMUSCULAR
Status: DISCONTINUED | OUTPATIENT
Start: 2022-11-27 | End: 2022-11-28 | Stop reason: HOSPADM

## 2022-11-27 RX ORDER — ASPIRIN 81 MG/1
81 TABLET, CHEWABLE ORAL DAILY
Status: CANCELLED | OUTPATIENT
Start: 2022-11-27

## 2022-11-27 RX ORDER — FOLIC ACID 1 MG/1
1 TABLET ORAL DAILY
Status: CANCELLED | OUTPATIENT
Start: 2022-11-27

## 2022-11-27 RX ORDER — IPRATROPIUM BROMIDE AND ALBUTEROL SULFATE 2.5; .5 MG/3ML; MG/3ML
1 SOLUTION RESPIRATORY (INHALATION)
Status: COMPLETED | OUTPATIENT
Start: 2022-11-27 | End: 2022-11-27

## 2022-11-27 RX ORDER — CALCIUM CARBONATE/VITAMIN D3 600 MG-10
1 TABLET ORAL DAILY
Status: CANCELLED | OUTPATIENT
Start: 2022-11-27

## 2022-11-27 RX ORDER — TAMSULOSIN HYDROCHLORIDE 0.4 MG/1
0.4 CAPSULE ORAL DAILY
Status: CANCELLED | OUTPATIENT
Start: 2022-11-27

## 2022-11-27 RX ORDER — 0.9 % SODIUM CHLORIDE 0.9 %
1000 INTRAVENOUS SOLUTION INTRAVENOUS ONCE
Status: COMPLETED | OUTPATIENT
Start: 2022-11-27 | End: 2022-11-27

## 2022-11-27 RX ORDER — ATORVASTATIN CALCIUM 10 MG/1
10 TABLET, FILM COATED ORAL DAILY
Status: CANCELLED | OUTPATIENT
Start: 2022-11-27

## 2022-11-27 RX ORDER — SODIUM CHLORIDE 9 MG/ML
INJECTION, SOLUTION INTRAVENOUS CONTINUOUS
Status: DISCONTINUED | OUTPATIENT
Start: 2022-11-27 | End: 2022-11-28 | Stop reason: HOSPADM

## 2022-11-27 RX ORDER — LEVOTHYROXINE SODIUM 0.03 MG/1
25 TABLET ORAL DAILY
Status: CANCELLED | OUTPATIENT
Start: 2022-11-27

## 2022-11-27 RX ORDER — PREDNISONE 20 MG/1
40 TABLET ORAL DAILY
Status: DISCONTINUED | OUTPATIENT
Start: 2022-11-28 | End: 2022-11-28 | Stop reason: HOSPADM

## 2022-11-27 RX ORDER — SODIUM CHLORIDE 1000 MG
1 TABLET, SOLUBLE MISCELLANEOUS 2 TIMES DAILY WITH MEALS
Status: CANCELLED | OUTPATIENT
Start: 2022-11-27

## 2022-11-27 RX ORDER — LORAZEPAM 2 MG/1
2 TABLET ORAL
Status: DISCONTINUED | OUTPATIENT
Start: 2022-11-27 | End: 2022-11-28 | Stop reason: HOSPADM

## 2022-11-27 RX ORDER — SODIUM CHLORIDE 0.9 % (FLUSH) 0.9 %
5-40 SYRINGE (ML) INJECTION PRN
Status: DISCONTINUED | OUTPATIENT
Start: 2022-11-27 | End: 2022-11-28 | Stop reason: HOSPADM

## 2022-11-27 RX ORDER — LANOLIN ALCOHOL/MO/W.PET/CERES
100 CREAM (GRAM) TOPICAL DAILY
Status: DISCONTINUED | OUTPATIENT
Start: 2022-11-27 | End: 2022-11-28 | Stop reason: HOSPADM

## 2022-11-27 RX ORDER — IPRATROPIUM BROMIDE AND ALBUTEROL SULFATE 2.5; .5 MG/3ML; MG/3ML
1 SOLUTION RESPIRATORY (INHALATION) 4 TIMES DAILY
Status: DISCONTINUED | OUTPATIENT
Start: 2022-11-27 | End: 2022-11-27

## 2022-11-27 RX ORDER — LORAZEPAM 2 MG/1
4 TABLET ORAL
Status: DISCONTINUED | OUTPATIENT
Start: 2022-11-27 | End: 2022-11-28 | Stop reason: HOSPADM

## 2022-11-27 RX ORDER — IPRATROPIUM BROMIDE AND ALBUTEROL SULFATE 2.5; .5 MG/3ML; MG/3ML
1 SOLUTION RESPIRATORY (INHALATION) EVERY 4 HOURS PRN
Status: DISCONTINUED | OUTPATIENT
Start: 2022-11-27 | End: 2022-11-27

## 2022-11-27 RX ORDER — PRASUGREL 10 MG/1
10 TABLET, FILM COATED ORAL DAILY
Status: CANCELLED | OUTPATIENT
Start: 2022-11-27

## 2022-11-27 RX ORDER — SODIUM CHLORIDE 0.9 % (FLUSH) 0.9 %
5-40 SYRINGE (ML) INJECTION EVERY 12 HOURS SCHEDULED
Status: DISCONTINUED | OUTPATIENT
Start: 2022-11-27 | End: 2022-11-28 | Stop reason: HOSPADM

## 2022-11-27 RX ORDER — LORAZEPAM 2 MG/ML
1 INJECTION INTRAMUSCULAR
Status: DISCONTINUED | OUTPATIENT
Start: 2022-11-27 | End: 2022-11-28 | Stop reason: HOSPADM

## 2022-11-27 RX ORDER — ALBUTEROL SULFATE 2.5 MG/3ML
15 SOLUTION RESPIRATORY (INHALATION)
Status: DISCONTINUED | OUTPATIENT
Start: 2022-11-27 | End: 2022-11-28 | Stop reason: HOSPADM

## 2022-11-27 RX ADMIN — IPRATROPIUM BROMIDE AND ALBUTEROL SULFATE 1 AMPULE: .5; 3 SOLUTION RESPIRATORY (INHALATION) at 12:05

## 2022-11-27 RX ADMIN — SODIUM CHLORIDE 1000 ML: 9 INJECTION, SOLUTION INTRAVENOUS at 12:49

## 2022-11-27 RX ADMIN — SODIUM CHLORIDE: 9 INJECTION, SOLUTION INTRAVENOUS at 22:36

## 2022-11-27 RX ADMIN — METHYLPREDNISOLONE SODIUM SUCCINATE 125 MG: 125 INJECTION, POWDER, FOR SOLUTION INTRAMUSCULAR; INTRAVENOUS at 12:49

## 2022-11-27 RX ADMIN — IPRATROPIUM BROMIDE AND ALBUTEROL SULFATE 1 AMPULE: .5; 3 SOLUTION RESPIRATORY (INHALATION) at 12:23

## 2022-11-27 RX ADMIN — SODIUM CHLORIDE 1000 ML: 9 INJECTION, SOLUTION INTRAVENOUS at 16:10

## 2022-11-27 RX ADMIN — IPRATROPIUM BROMIDE AND ALBUTEROL SULFATE 1 AMPULE: .5; 3 SOLUTION RESPIRATORY (INHALATION) at 11:50

## 2022-11-27 RX ADMIN — IOPAMIDOL 80 ML: 755 INJECTION, SOLUTION INTRAVENOUS at 17:10

## 2022-11-27 RX ADMIN — IPRATROPIUM BROMIDE AND ALBUTEROL SULFATE 1 AMPULE: .5; 3 SOLUTION RESPIRATORY (INHALATION) at 20:21

## 2022-11-27 RX ADMIN — CEFTRIAXONE SODIUM 1000 MG: 1 INJECTION, POWDER, FOR SOLUTION INTRAMUSCULAR; INTRAVENOUS at 13:55

## 2022-11-27 ASSESSMENT — ENCOUNTER SYMPTOMS
SHORTNESS OF BREATH: 1
ABDOMINAL PAIN: 1
ANAL BLEEDING: 0
SINUS PRESSURE: 0
APNEA: 0
EYE PAIN: 0
RECTAL PAIN: 0
EYE DISCHARGE: 0
PHOTOPHOBIA: 0
CHEST TIGHTNESS: 0
COUGH: 0
CHOKING: 0
EYE REDNESS: 0
BLOOD IN STOOL: 0
SINUS PAIN: 0
FACIAL SWELLING: 0

## 2022-11-27 ASSESSMENT — PAIN - FUNCTIONAL ASSESSMENT
PAIN_FUNCTIONAL_ASSESSMENT: NONE - DENIES PAIN
PAIN_FUNCTIONAL_ASSESSMENT: NONE - DENIES PAIN

## 2022-11-27 NOTE — ED NOTES
Patient ambulated to cot with 1 assist. Patient immediately up to side of bed needing to use urinal. Patient states he has been having some urgency.         Asia Brannon RN  11/27/22 9978

## 2022-11-27 NOTE — ED NOTES
ED to inpatient nurses report    Chief Complaint   Patient presents with    Shortness of Breath    Fatigue      Present to ED from home  LOC: alert and orientated to name, place, date  Vital signs   Vitals:    11/27/22 1230 11/27/22 1252 11/27/22 1326 11/27/22 1450   BP:  133/86 (!) 148/101 131/88   Pulse: 84 83 94 93   Resp: 22 24 20 20   Temp:       TempSrc:       SpO2: 99% 98% 98% 98%   Weight:          Oxygen Baseline 98    Current needs required RA Bipap/Cpap No  LDAs:   Peripheral IV 11/27/22 Left Antecubital (Active)   Site Assessment Clean, dry & intact 11/27/22 1450   Line Status Blood return noted; Flushed;Normal saline locked 11/27/22 1131   Phlebitis Assessment No symptoms 11/27/22 1131   Infiltration Assessment 0 11/27/22 1131   Dressing Status Clean, dry & intact 11/27/22 1450   Dressing Intervention New 11/27/22 1131     Mobility: Requires assistance * 1  Pending ED orders:   Present condition: stable      C-SSRS Risk of Suicide: No Risk  Swallow Screening    Preferred Language: [place]     Electronically signed by Jess Alfonso RN on 11/27/2022 at 3:37 PM       Jyoti Phillips RN  11/27/22 [address] Jennifer Biggs RN  11/27/22 5962

## 2022-11-27 NOTE — ED NOTES
Pt lying in bed with family at side. Resp regular. Denies needs. CAll light in reach.       Joe Ozuna RN  11/27/22 9485

## 2022-11-27 NOTE — ED NOTES
Pt resting in bed. Resp regular. Family at side. Denies needs. Call light in reach.       Damaris Blackman RN  11/27/22 4816

## 2022-11-27 NOTE — ED NOTES
ED resident in to evaluate patient. Warm blankets applied. Patient cold to the touch and making shivering like movements. Patient arrives to the ED via ATFD from home for concerns of increasing and worsening fatigue and SOB. Patient has a history of COPD but does not wear oxygen at home. Medics report oxygen 88% at room and applied oxygen. Patient room air in room at 81%. 3L NC applied to bring oxygen WNL.       Kodi Brannon RN  11/27/22 6874

## 2022-11-27 NOTE — ED PROVIDER NOTES
[facility] ENCOUNTER          Pt Name: Annelise Jarvis  MRN: [id number]  [place] 1945  Date of evaluation: 11/27/2022  Treating Resident Physician: Joen Romberg, MD  Supervising Physician: Leola Baez, [address] [place] [address]       Chief Complaint   Patient presents with    Shortness of Breath    Fatigue     History obtained from the patient. HISTORY OF PRESENT ILLNESS    HPI  Annelise Jarvis is a 78 y.o. male with PMHx of CAD status post cath, COPD, angina, smoker who presents to the emergency department for evaluation of shortness of breath, fatigue. Patient due to chief complaint of shortness of breath, fatigue. Patient states that he has been having fatigue for the past 3 weeks. Patient states that his shortness of breath started about 2 days ago. Patient brought in by ambulance due to increasing symptoms. Medic reports SPO2 88% on room air. In the ED, SPO2 was 81% on room air. Patient does have a history of COPD but does not wear oxygen at home. Patient also does not have any home medications. States that he was given inhaler once but it was annoying so he never used it. Patient is a chronic smoker and has a 50-pack-year history. Patient also complaining of lower abdominal pain in the suprapubic region. States that he does have some abdominal hernias that have not been repaired yet so he thought the pain was due to that. Patient also complaining of dysuria, urinary urgency and frequency. States that has been ongoing for weeks. Patient denies any nausea or vomiting, no diarrhea. The patient has no other acute complaints at this time. REVIEW OF SYSTEMS   Review of Systems   Constitutional:  Positive for fatigue. Negative for activity change, chills and diaphoresis. HENT:  Negative for congestion, ear discharge, ear pain, facial swelling, hearing loss, sinus pressure and sinus pain.     Eyes:  Negative for photophobia, pain, discharge and redness. Respiratory:  Positive for shortness of breath. Negative for apnea, cough, choking and chest tightness. Cardiovascular:  Negative for chest pain and leg swelling. Gastrointestinal:  Positive for abdominal pain. Negative for anal bleeding, blood in stool and rectal pain. Endocrine: Negative for polydipsia, polyphagia and polyuria. Genitourinary:  Positive for dysuria, frequency and urgency. Negative for flank pain, genital sores and hematuria. Musculoskeletal:  Negative for gait problem, joint swelling, myalgias, neck pain and neck stiffness. Skin:  Negative for pallor, rash and wound. Neurological:  Negative for tremors, seizures, syncope, facial asymmetry and headaches. Hematological:  Negative for adenopathy. Psychiatric/Behavioral:  Negative for agitation, behavioral problems, hallucinations, self-injury and suicidal ideas. PAST MEDICAL AND SURGICAL HISTORY     Past Medical History:   Diagnosis Date    Angina at rest [facility]) 1/17/2018    Hypertension     Precordial pain 1/17/2018    S/P cardiac cath: 1/25/2018: 70-75% mid-LAD. LCX OK. RCA 20%. LVEF 454-50%. LVEDP 12 mmHg. 1/25/2018 1/25/2018: 70-75% mid-LAD. LCX OK. RCA 20%. LVEF 454-50%. LVEDP 12 mmHg. Dr. Sreedhar Walden    S/P PTCA: 1/25/2018: FFR-guided stenting of mid-LAD Xience 2.75x28 mm, post-dilated to 3.78 mm proximally. 1/25/2018 1/25/2018: FFR-guided stenting of mid-LAD Xience 2.75x28 mm, post-dilated to 3.78 mm proximally.  Dr. Sreedhar Walden    Thyroid disease      Past Surgical History:   Procedure Laterality Date    CARDIAC CATHETERIZATION      CATARACT REMOVAL Bilateral 2017    CATARACT REMOVAL Bilateral     JOINT REPLACEMENT Left 2010    KNEE SURGERY Bilateral     scopes    PTCA         MEDICATIONS     Current Facility-Administered Medications:     albuterol (PROVENTIL) nebulizer solution 15 mg, 15 mg, Nebulization, Q1H PRN **AND** ipratropium (ATROVENT) 0.02 % nebulizer solution 0.5 mg, 0.5 mg, Nebulization, Q1H PRN, Robbin Matute MD    Current Outpatient Medications:     furosemide (LASIX) 20 MG tablet, TAKE 1 TABLET BY MOUTH EVERY DAY, Disp: 90 tablet, Rfl: 0    escitalopram (LEXAPRO) 10 MG tablet, TAKE 1 TABLET BY MOUTH EVERY DAY, Disp: , Rfl:     Thiamine Mononitrate (B1) 100 MG TABS, TAKE 1 TABLET BY MOUTH EVERY DAY, Disp: , Rfl:     atorvastatin (LIPITOR) 80 MG tablet, TAKE 1 TABLET BY MOUTH EVERY DAY AT NIGHT, Disp: 90 tablet, Rfl: 3    Blood Pressure Monitoring (BLOOD PRESSURE MONITOR AUTOMAT) MARIANN, 1 applicator by Does not apply route daily (Patient not taking: Reported on 3/10/2022), Disp: 1 each, Rfl: 0    potassium chloride (KLOR-CON M) 10 MEQ extended release tablet, Take 1 tablet by mouth daily, Disp: 90 tablet, Rfl: 3    nitroGLYCERIN (NITROSTAT) 0.4 MG SL tablet, Place 1 tablet under the tongue every 5 minutes as needed for Chest pain up to max of 3 total doses. If no relief after 1 dose, call 911., Disp: 25 tablet, Rfl: 1    folic acid (FOLVITE) 1 MG tablet, Take 1 tablet by mouth daily, Disp: 30 tablet, Rfl: 3    sodium chloride 1 g tablet, Take 1 tablet by mouth 2 times daily (with meals), Disp: 90 tablet, Rfl: 3    amLODIPine (NORVASC) 5 MG tablet, TAKE 1 TABLET BY MOUTH ONE TIME A DAY, Disp: 30 tablet, Rfl: 1    prasugrel (EFFIENT) 10 MG TABS, Take 1 tablet by mouth daily, Disp: 90 tablet, Rfl: 3    aspirin 81 MG chewable tablet, Take 1 tablet by mouth daily, Disp: 30 tablet, Rfl: 0    levothyroxine (SYNTHROID) 25 MCG tablet, Take 25 mcg by mouth Daily, Disp: , Rfl:     tamsulosin (FLOMAX) 0.4 MG capsule, Take 0.4 mg by mouth daily, Disp: , Rfl:     SOCIAL HISTORY     Social History     Social History Narrative    Not on file     Social History     Tobacco Use    Smoking status: Every Day     Packs/day: 1.50     Years: 25.00     Pack years: 37.50     Types: Cigarettes    Smokeless tobacco: Former     Quit date: 2014   Vaping Use    Vaping Use: Never used   Substance Use Topics    Alcohol use:  Yes Comment: 4-6 beers daily    Drug use: No       ALLERGIES   No Known Allergies    FAMILY HISTORY     Family History   Problem Relation Age of Onset    Cancer Mother         breast    Heart Disease Father        PREVIOUS RECORDS   Previous records reviewed: I reviewed the patient's past medical records including relevant labs, imaging and procedures. Patient was last by cardiology on 11/23/2022 for syncope    PHYSICAL EXAM     ED Triage Vitals [11/27/22 1127]   BP Temp Temp Source Heart Rate Resp SpO2 Height Weight   (!) 181/110 98.5 °F (36.9 °C) Axillary (!) 101 14 (S) (!) 81 % -- 230 lb (104.3 kg)     Initial vital signs and nursing assessment reviewed and abnormal from hypertension, tachycardia, hypoxia . Body mass index is 32.08 kg/m². Pulsoximetry is abnormal per my interpretation. Additional Vital Signs:  Vitals:    11/27/22 1707   BP: (!) 157/104   Pulse: 94   Resp: 20   Temp:    SpO2: 98%       Physical Exam  Constitutional:       Appearance: Normal appearance. He is obese. He is ill-appearing. HENT:      Head: Normocephalic and atraumatic. Right Ear: External ear normal.      Left Ear: External ear normal.      Nose: Nose normal. No congestion or rhinorrhea. Mouth/Throat:      Comments: Dry cracked lips  Eyes:      General: No scleral icterus. Right eye: No discharge. Left eye: No discharge. Extraocular Movements: Extraocular movements intact. Conjunctiva/sclera: Conjunctivae normal.      Pupils: Pupils are equal, round, and reactive to light. Cardiovascular:      Rate and Rhythm: Normal rate and regular rhythm. Pulses: Normal pulses. Heart sounds: Normal heart sounds. No murmur heard. Pulmonary:      Effort: Pulmonary effort is normal. No respiratory distress. Breath sounds: No stridor. Examination of the right-upper field reveals decreased breath sounds. Examination of the left-upper field reveals decreased breath sounds.  Examination of the right-middle field reveals decreased breath sounds. Examination of the left-middle field reveals decreased breath sounds. Examination of the right-lower field reveals decreased breath sounds. Examination of the left-lower field reveals decreased breath sounds. Decreased breath sounds present. No wheezing. Chest:      Chest wall: No tenderness. Abdominal:      General: Abdomen is protuberant. Bowel sounds are normal. There is no distension. Palpations: Abdomen is soft. There is no mass. Tenderness: There is abdominal tenderness in the suprapubic area. There is no guarding or rebound. Hernia: A hernia is present. Hernia is present in the ventral area. Musculoskeletal:         General: No swelling, tenderness, deformity or signs of injury. Normal range of motion. Cervical back: Normal range of motion and neck supple. Skin:     Capillary Refill: Capillary refill takes less than 2 seconds. Coloration: Skin is pale. Skin is not jaundiced. Findings: No bruising or erythema. Comments: Cool skin   Neurological:      General: No focal deficit present. Mental Status: He is alert and oriented to person, place, and time. Motor: No weakness. Psychiatric:         Mood and Affect: Mood normal.         Behavior: Behavior normal.         Thought Content:  Thought content normal.     ED RESULTS   Laboratory results:  Labs Reviewed   BASIC METABOLIC PANEL W/ REFLEX TO MG FOR LOW K - Abnormal; Notable for the following components:       Result Value    Sodium 131 (*)     Chloride 94 (*)     CO2 22 (*)     Glucose 134 (*)     All other components within normal limits   CBC WITH AUTO DIFFERENTIAL - Abnormal; Notable for the following components:    MCV 97.5 (*)     MCH 34.8 (*)     MCHC 35.7 (*)     RDW-SD 46.4 (*)     All other components within normal limits   LACTATE, SEPSIS - Abnormal; Notable for the following components:    Lactic Acid, Sepsis 2.7 (*)     All other components within normal limits   LACTATE, SEPSIS - Abnormal; Notable for the following components:    Lactic Acid, Sepsis 4.6 (*)     All other components within normal limits   OSMOLALITY - Abnormal; Notable for the following components:    Osmolality Calc 264.0 (*)     All other components within normal limits   URINE WITH REFLEXED MICRO - Abnormal; Notable for the following components:    Ketones, Urine TRACE (*)     Leukocyte Esterase, Urine MODERATE (*)     All other components within normal limits   LACTIC ACID - Abnormal; Notable for the following components:    Lactic Acid 4.4 (*)     All other components within normal limits   COVID-19 & INFLUENZA COMBO   CULTURE, BLOOD 1   CULTURE, BLOOD 2   BRAIN NATRIURETIC PEPTIDE   TROPONIN   GLOMERULAR FILTRATION RATE, ESTIMATED   PROCALCITONIN   ANION GAP       Radiologic studies results:  CT ABDOMEN PELVIS W IV CONTRAST Additional Contrast? None   Final Result   1. No acute intra-abdominal or intrapelvic findings. 2. Bilateral nonobstructive nephrolithiasis. 3. Sigmoid colon diverticulosis. Final report electronically signed by Dr. Amanda Ascencio on 11/27/2022 5:26 PM      XR CHEST PORTABLE   Final Result   Stable radiographic appearance of the chest. No evidence of an acute process. **This report has been created using voice recognition software. It may contain minor errors which are inherent in voice recognition technology. **      Final report electronically signed by Dr. Maira Ohara on 11/27/2022 12:36 PM          ED Medications administered this visit:   Medications   albuterol (PROVENTIL) nebulizer solution 15 mg (has no administration in time range)     And   ipratropium (ATROVENT) 0.02 % nebulizer solution 0.5 mg (has no administration in time range)   0.9 % sodium chloride bolus (0 mLs IntraVENous Stopped 11/27/22 1450)   ipratropium-albuterol (DUONEB) nebulizer solution 1 ampule (1 ampule Inhalation Given 11/27/22 1223)     Or   albuterol Chest x-ray showing no evidence of acute infection. COVID and flu were negative. Concern for possible COPD exacerbation, patient was given 125 Solu-Medrol as well as DuoNeb treatments. Upon reevaluation after breathing treatment, SPO2 98% on room air. Ambulatory pulse ox was 96 to 97%. Urinalysis showing moderate leukocyte esterases with 5-7 WBCs. No bacterial cells. Although minimal criteria for UTI, due to patient's presentation, treatment pursued. 1 g Rocephin given in the ED. Repeat lactic acid was 4.6 after work 1 L IV fluids. Patient reevaluated again, abdomen was palpated. Patient only minimally tender in the suprapubic region. At this time, did consider possible lab error. Repeat lactic acid was obtained showing 4.4. At this time, CT abdomen and pelvis was obtained showing no acute intra-abdominal or intrapelvic findings. At this time, patient most likely had COPD exacerbation as he has a chronic history of smoking and not taking his medication which caused his acute hypoxia. However, patient remains to have a lactic acidosis for unknown reasons. Would like to admit this patient for further evaluation. MEDICATION CHANGES     New Prescriptions    No medications on file       FINAL DISPOSITION     Final diagnoses:   Lactic acidosis   Acute respiratory distress   Hypoxia   Generalized weakness     Condition: condition: fair  Dispo: Admit to med/surg floor    This transcription was electronically signed. Parts of this transcriptions may have been dictated by use of voice recognition software and electronically transcribed, and parts may have been transcribed with the assistance of an ED scribe. The transcription may contain errors not detected in proofreading. Please refer to my supervising physician's documentation if my documentation differs.     Electronically Signed: Sandra Zhou MD, 11/27/22, 6:00 PM         Jazz Robles MD  Resident  11/27/22 1800

## 2022-11-27 NOTE — ED NOTES
Assumed pt care at this time. Report received from [facility]. Pt resting in bed/ Resp regular. Dr. Candy Taylor in room to update pt on POC and results. Meal tray ordered for pt. Call light in reach. Family at side.       Jagruti Gutierrez RN  11/27/22 3404

## 2022-11-27 NOTE — ED NOTES
PT resting in bed. Resp regular. Denies all needs. Call light in reach.       Nadia Méndez RN  11/27/22 8629

## 2022-11-27 NOTE — H&P
HISTORY AND PHYSICAL             Date: 11/27/2022        Patient Name: Jorge Alberto Verma     YOB: 1945      Age:  78 y.o. Chief Complaint     Chief Complaint   Patient presents with    Shortness of Breath    Fatigue          History Obtained From   patient    History of Present Illness   44-year-old male with past medical history of coronary disease prior cardiac stents history of CHF with preserved ejection fraction hypothyroidism hyperlipidemia known history of 8 mm cavitary lesion for which patient still did not decide to go see the pulmonologist presents to the ER with complaints of increasing fatigue. He denies being short of breath but work-up in the ER he was hypoxic needing supplemental oxygen. He did mention he was short of breath to ER staff he denies any fever or chills he always has a dry cough from his smoking he still continues to smoke at least 2 packs a day even though his been encouraged on multiple occasions to quit both smoking smoking and alcohol  He also drinks at least 5-6 drinks a day his last alcoholic drink was last night. He denies any recent falls. He does have chronic swelling in his lower extremities. He did have hyponatremia for which he is on sodium tablets. He denies any chest pain palpitations no unusual bleeding. He is also seen cardiology for his bradycardia that he had in the past .no syncope. Past Medical History     Past Medical History:   Diagnosis Date    Angina at rest [facility]) 1/17/2018    Hypertension     Precordial pain 1/17/2018    S/P cardiac cath: 1/25/2018: 70-75% mid-LAD. LCX OK. RCA 20%. LVEF 454-50%. LVEDP 12 mmHg. 1/25/2018 1/25/2018: 70-75% mid-LAD. LCX OK. RCA 20%. LVEF 454-50%. LVEDP 12 mmHg. Dr. Hal Gottron    S/P PTCA: 1/25/2018: FFR-guided stenting of mid-LAD Xience 2.75x28 mm, post-dilated to 3.78 mm proximally. 1/25/2018 1/25/2018: FFR-guided stenting of mid-LAD Xience 2.75x28 mm, post-dilated to 3.78 mm proximally.  Dr. Hal Gottron Thyroid disease         Past Surgical History     Past Surgical History:   Procedure Laterality Date    CARDIAC CATHETERIZATION      CATARACT REMOVAL Bilateral 2017    CATARACT REMOVAL Bilateral     JOINT REPLACEMENT Left 2010    KNEE SURGERY Bilateral     scopes    PTCA          Medications Prior to Admission     Prior to Admission medications    Medication Sig Start Date End Date Taking? Authorizing Provider   furosemide (LASIX) 20 MG tablet TAKE 1 TABLET BY MOUTH EVERY DAY 7/20/22   Brandt Cheung MD   escitalopram (LEXAPRO) 10 MG tablet TAKE 1 TABLET BY MOUTH EVERY DAY 12/8/21   Historical Provider, MD   Thiamine Mononitrate (B1) 100 MG TABS TAKE 1 TABLET BY MOUTH EVERY DAY 1/18/22   Historical Provider, MD   atorvastatin (LIPITOR) 80 MG tablet TAKE 1 TABLET BY MOUTH EVERY DAY AT NIGHT 1/18/22   RANJAN Mendoza CNP   Blood Pressure Monitoring (BLOOD PRESSURE MONITOR AUTOMAT) MARIANN 1 applicator by Does not apply route daily  Patient not taking: Reported on 3/10/2022 10/5/21   RANJAN Decker CNP   potassium chloride (KLOR-CON M) 10 MEQ extended release tablet Take 1 tablet by mouth daily 10/5/21   RANJAN Decker CNP   nitroGLYCERIN (NITROSTAT) 0.4 MG SL tablet Place 1 tablet under the tongue every 5 minutes as needed for Chest pain up to max of 3 total doses.  If no relief after 1 dose, call 911. 10/5/21   RANJAN Decker CNP   folic acid (FOLVITE) 1 MG tablet Take 1 tablet by mouth daily 8/28/21   RANJAN Floyd CNP   sodium chloride 1 g tablet Take 1 tablet by mouth 2 times daily (with meals) 8/27/21   RANJAN Floyd CNP   amLODIPine (NORVASC) 5 MG tablet TAKE 1 TABLET BY MOUTH ONE TIME A DAY 8/3/21   Beau Torres PA-C   prasugrel (EFFIENT) 10 MG TABS Take 1 tablet by mouth daily 3/11/21   Brandt Cheung MD   aspirin 81 MG chewable tablet Take 1 tablet by mouth daily 1/26/18   Zeny Hein MD   levothyroxine (SYNTHROID) 25 MCG tablet Take 25 mcg by mouth Daily    Historical Provider, MD   tamsulosin (FLOMAX) 0.4 MG capsule Take 0.4 mg by mouth daily    Historical Provider, MD        Allergies   Patient has no known allergies.     Social History     Social History       Tobacco History       Smoking Status  Every Day Smoking Frequency  1.50 packs/day for 25.00 years (37.50 pk-yrs) Smoking Tobacco Type  Cigarettes      Smokeless Tobacco Use  Former Quit Date  2014              Alcohol History       Alcohol Use Status  Yes Comment  4-6 beers daily              Drug Use       Drug Use Status  No              Sexual Activity       Sexually Active  Never                    Family History     Family History   Problem Relation Age of Onset    Cancer Mother         breast    Heart Disease Father        Review of Systems   General ROS: Complains of significant fatigue no lightheadedness dizziness or loss of consciousness  Ophthalmic ROS: No blurred vision eye pain or double vision  Hematological and Lymphatic ROS: No unusual bleeding no unusual lumps palpated  Respiratory ROS: Did agree to having cough but denied being short of breath with minimal exertion at home  Cardiovascular ROS: No chest pain palpitations does have chronic swelling in his legs  Gastrointestinal ROS: Complains of lower abdominal pain no nausea vomiting no constipation  Neurological ROS: No dizziness loss of consciousness had generalized weakness but no muscle weakness  Dermatological ROS: No skin rash    Physical Exam   BP (!) 149/101   Pulse 96   Temp 98.5 °F (36.9 °C) (Axillary)   Resp 20   Wt 230 lb (104.3 kg)   SpO2 98%   BMI 32.08 kg/m²     CONSTITUTIONAL: Awake appears comfortable lying in bed  EYES: Pupils react no icterus  ENT: Tongue is dry buccal mucosa is moist  NECK: Neck is supple no JVD or bruit appreciated  LUNGS: Air entry is diminished bilaterally with no rales or rhonchi  CARDIOVASCULAR: S1-S2 heard regular  ABDOMEN: Obese but soft bowel sounds are appreciated with no guarding no rebound  NEUROLOGIC: Awake oriented to person place and time no tremors noted  SKIN: No skin rash  Extremities positive edema not new    Labs      Recent Results (from the past 24 hour(s))   EKG 12 Lead    Collection Time: 11/27/22 11:23 AM   Result Value Ref Range    Ventricular Rate 96 BPM    Atrial Rate 96 BPM    P-R Interval 208 ms    QRS Duration 112 ms    Q-T Interval 350 ms    QTc Calculation (Bazett) 442 ms    P Axis 61 degrees    R Axis -81 degrees    T Axis 43 degrees   Basic Metabolic Panel w/ Reflex to MG    Collection Time: 11/27/22 11:30 AM   Result Value Ref Range    Sodium 131 (L) 135 - 145 meq/L    Potassium reflex Magnesium 3.7 3.5 - 5.2 meq/L    Chloride 94 (L) 98 - 111 meq/L    CO2 22 (L) 23 - 33 meq/L    Glucose 134 (H) 70 - 108 mg/dL    BUN 11 7 - 22 mg/dL    Creatinine 1.2 0.4 - 1.2 mg/dL    Calcium 8.9 8.5 - 10.5 mg/dL   Brain Natriuretic Peptide    Collection Time: 11/27/22 11:30 AM   Result Value Ref Range    Pro-.4 0.0 - 1800.0 pg/mL   CBC with Auto Differential    Collection Time: 11/27/22 11:30 AM   Result Value Ref Range    WBC 7.2 4.8 - 10.8 thou/mm3    RBC 5.12 4.70 - 6.10 mill/mm3    Hemoglobin 17.8 14.0 - 18.0 gm/dl    Hematocrit 49.9 42.0 - 52.0 %    MCV 97.5 (H) 80.0 - 94.0 fL    MCH 34.8 (H) 26.0 - 33.0 pg    MCHC 35.7 (H) 32.2 - 35.5 gm/dl    RDW-CV 12.9 11.5 - 14.5 %    RDW-SD 46.4 (H) 35.0 - 45.0 fL    Platelets 674 534 - 074 thou/mm3    MPV 9.5 9.4 - 12.4 fL    Seg Neutrophils 65.6 %    Lymphocytes 23.2 %    Monocytes 9.7 %    Eosinophils 0.7 %    Basophils 0.4 %    Immature Granulocytes 0.4 %    Segs Absolute 4.7 1.8 - 7.7 thou/mm3    Lymphocytes Absolute 1.7 1.0 - 4.8 thou/mm3    Monocytes Absolute 0.7 0.4 - 1.3 thou/mm3    Eosinophils Absolute 0.1 0.0 - 0.4 thou/mm3    Basophils Absolute 0.0 0.0 - 0.1 thou/mm3    Immature Grans (Abs) 0.03 0.00 - 0.07 thou/mm3    nRBC 0 /100 wbc   Troponin    Collection Time: 11/27/22 11:30 AM   Result Value Ref Range    Troponin T < 0.010 ng/ml   Procalcitonin    Collection Time: 11/27/22 11:30 AM   Result Value Ref Range    Procalcitonin 0.02 0.01 - 0.09 ng/mL   Anion Gap    Collection Time: 11/27/22 11:30 AM   Result Value Ref Range    Anion Gap 15.0 8.0 - 16.0 meq/L   Osmolality    Collection Time: 11/27/22 11:30 AM   Result Value Ref Range    Osmolality Calc 264.0 (L) 275.0 - 300.0 mOsmol/kg   COVID-19 & Influenza Combo    Collection Time: 11/27/22 11:43 AM    Specimen: Nasopharyngeal Swab   Result Value Ref Range    SARS-CoV-2 RNA, RT PCR NOT DETECTED NOT DETECTED    INFLUENZA A NOT DETECTED NOT DETECTED    INFLUENZA B NOT DETECTED NOT DETECTED   Glomerular Filtration Rate, Estimated    Collection Time: 11/27/22 11:45 AM   Result Value Ref Range    Est, Glom Filt Rate >60 >60 ml/min/1.73m2   Lactate, Sepsis    Collection Time: 11/27/22 11:53 AM   Result Value Ref Range    Lactic Acid, Sepsis 2.7 (H) 0.5 - 1.9 mmol/L   Urine with Reflexed Micro    Collection Time: 11/27/22 12:10 PM   Result Value Ref Range    Glucose, Ur NEGATIVE NEGATIVE mg/dl    Bilirubin Urine NEGATIVE NEGATIVE    Ketones, Urine TRACE (A) NEGATIVE    Specific Gravity, Urine 1.006 1.002 - 1.030    Blood, Urine NEGATIVE NEGATIVE    pH, UA 7.5 5.0 - 9.0    Protein, UA NEGATIVE NEGATIVE    Urobilinogen, Urine 1.0 0.0 - 1.0 eu/dl    Nitrite, Urine NEGATIVE NEGATIVE    Leukocyte Esterase, Urine MODERATE (A) NEGATIVE    Color, UA YELLOW STRAW-YELLOW    Character, Urine CLEAR CLEAR-SL CLOUD    RBC, UA NONE SEEN 0-2/hpf /hpf    WBC, UA 5-9 0-4/hpf /hpf    Epithelial Cells, UA NONE SEEN 3-5/hpf /hpf    Bacteria, UA NONE SEEN FEW/NONE SEEN /hpf    Casts UA NONE SEEN NONE SEEN /lpf    Crystals, UA NONE SEEN NONE SEEN    Renal Epithelial, UA NONE SEEN NONE SEEN    Yeast, UA NONE SEEN NONE SEEN    CASTS 2 NONE SEEN NONE SEEN /lpf    MISCELLANEOUS 2 NONE SEEN    Lactate, Sepsis    Collection Time: 11/27/22  2:45 PM   Result Value Ref Range    Lactic Acid, Sepsis 4.6 (H) 0.5 - 1.9 mmol/L   Lactic Acid    Collection Time: 11/27/22  4:14 PM   Result Value Ref Range    Lactic Acid 4.4 (H) 0.5 - 2.0 mmol/L        Imaging/Diagnostics Last 24 Hours   CT ABDOMEN PELVIS W IV CONTRAST Additional Contrast? None    Result Date: 11/27/2022  PROCEDURE: CT ABDOMEN PELVIS W IV CONTRAST CLINICAL INFORMATION: Suprapubic pain TECHNIQUE: CT of the abdomen and pelvis was performed following administration of 80 mL Isovue-370 intravenous contrast only. Axial images as well as coronal and sagittal reconstructions were obtained. All CT scans at this facility use dose modulation, iterative reconstruction, and/or weight-based dosing when appropriate to reduce radiation dose to as low as reasonably achievable. COMPARISON: CT abdomen and pelvis 3/16/2017 FINDINGS: Lower thorax: Visualized lung bases are clear. There is no pericardial effusion. There is a small Bochdalek hernia on the right side. A small hiatal hernia suggested. Abdomen: There is no free intraperitoneal air or fluid. Bowel is normal in course and caliber without evidence of obstruction. There is a calcified granuloma in the spleen. There is fatty replacement of the pancreas. There is a nonobstructive 0.6 cm calcification in the right mid kidney (image 44). A punctate calcification measuring less than millimeter is seen at the lower left kidney (image 50). A hypoattenuating lesion in the left lower kidney is likely a cyst. The liver, gallbladder and adrenal glands are normal. Atherosclerotic calcifications are present in the abdominal aorta without evidence of aneurysm. A small umbilical hernia contains mesenteric fat. There is no mesenteric or retroperitoneal lymphadenopathy. Degenerative changes are present in the lumbar spine without evidence of aggressive osseous lesions. Pelvis: The urinary bladder is unremarkable. The prostate gland is mildly enlarged and contains calcifications. There are phleboliths in the pelvis.  There are diverticula in sigmoid colon. There is no pelvic or inguinal lymphadenopathy. Degenerative changes are present in the pelvis without evidence of aggressive osseous lesions. 1. No acute intra-abdominal or intrapelvic findings. 2. Bilateral nonobstructive nephrolithiasis. 3. Sigmoid colon diverticulosis. Final report electronically signed by Dr. Ingrid Escamilla on 11/27/2022 5:26 PM    XR CHEST PORTABLE    Result Date: 11/27/2022  PROCEDURE: XR CHEST PORTABLE CLINICAL INFORMATION: sob. Fatigue. COMPARISON: 8/24/2021. TECHNIQUE: AP upright view of the chest. FINDINGS: The heart size is normal.The mediastinum is not widened. There is a small loop recorder over the left aspect of the heart. There are no pulmonary infiltrates or effusions. The pulmonary vessels appear normal.     Stable radiographic appearance of the chest. No evidence of an acute process. **This report has been created using voice recognition software. It may contain minor errors which are inherent in voice recognition technology. ** Final report electronically signed by Dr. Elizabeth Mejía on 11/27/2022 12:36 PM      Assessment    COPD exacerbation  Lactic acidosis source unclear  Hyponatremia  Alcohol abuse  CHF secondary to diastolic dysfunction  Hyperlipidemia  Hypothyroidism  Nicotine use  8mm cavitary lesionronary artery disease with prior cardiac stents      Plan   Continue with bronchodilators  Careful administration of steroids  Procalcitonin is normal no fever no elevated white count hence hold off on antibiotics at this time  Continue sodium tablets  Follow-up with labs  Careful IV hydration continue to monitor lactic acid till normalized  Based on his progress may need pulmonary consult  CIWA scale for his alcohol use  Encourage patient to quit smoking and alcohol  DVT prophylaxis  Continue antiplatelets and beta-blockers    Consultations Ordered:  IP CONSULT TO SOCIAL WORK  IP CONSULT TO ADDICTION SERVICES    Electronically signed by Shoaib Sesay MD on 11/27/22 at 6:39 PM EST

## 2022-11-28 VITALS
TEMPERATURE: 98.6 F | OXYGEN SATURATION: 98 % | SYSTOLIC BLOOD PRESSURE: 121 MMHG | BODY MASS INDEX: 30.31 KG/M2 | DIASTOLIC BLOOD PRESSURE: 87 MMHG | WEIGHT: 211.7 LBS | HEIGHT: 70 IN | RESPIRATION RATE: 16 BRPM | HEART RATE: 88 BPM

## 2022-11-28 LAB
ANION GAP SERPL CALCULATED.3IONS-SCNC: 11 MEQ/L (ref 8–16)
BASOPHILS # BLD: 0.1 %
BASOPHILS ABSOLUTE: 0 THOU/MM3 (ref 0–0.1)
BUN BLDV-MCNC: 11 MG/DL (ref 7–22)
CALCIUM SERPL-MCNC: 8.5 MG/DL (ref 8.5–10.5)
CHLORIDE BLD-SCNC: 103 MEQ/L (ref 98–111)
CO2: 22 MEQ/L (ref 23–33)
CREAT SERPL-MCNC: 0.8 MG/DL (ref 0.4–1.2)
EOSINOPHIL # BLD: 0 %
EOSINOPHILS ABSOLUTE: 0 THOU/MM3 (ref 0–0.4)
ERYTHROCYTE [DISTWIDTH] IN BLOOD BY AUTOMATED COUNT: 12.8 % (ref 11.5–14.5)
ERYTHROCYTE [DISTWIDTH] IN BLOOD BY AUTOMATED COUNT: 47.5 FL (ref 35–45)
GFR SERPL CREATININE-BSD FRML MDRD: > 60 ML/MIN/1.73M2
GLUCOSE BLD-MCNC: 140 MG/DL (ref 70–108)
HCT VFR BLD CALC: 43 % (ref 42–52)
HEMOGLOBIN: 14.7 GM/DL (ref 14–18)
IMMATURE GRANS (ABS): 0.08 THOU/MM3 (ref 0–0.07)
IMMATURE GRANULOCYTES: 0.6 %
LACTIC ACID: 1.4 MMOL/L (ref 0.5–2)
LACTIC ACID: 2.1 MMOL/L (ref 0.5–2)
LACTIC ACID: 2.8 MMOL/L (ref 0.5–2)
LACTIC ACID: 5.8 MMOL/L (ref 0.5–2)
LYMPHOCYTES # BLD: 4.7 %
LYMPHOCYTES ABSOLUTE: 0.6 THOU/MM3 (ref 1–4.8)
MCH RBC QN AUTO: 34.4 PG (ref 26–33)
MCHC RBC AUTO-ENTMCNC: 34.2 GM/DL (ref 32.2–35.5)
MCV RBC AUTO: 100.7 FL (ref 80–94)
MONOCYTES # BLD: 3.2 %
MONOCYTES ABSOLUTE: 0.4 THOU/MM3 (ref 0.4–1.3)
NUCLEATED RED BLOOD CELLS: 0 /100 WBC
PLATELET # BLD: 129 THOU/MM3 (ref 130–400)
PMV BLD AUTO: 10 FL (ref 9.4–12.4)
POTASSIUM SERPL-SCNC: 4.7 MEQ/L (ref 3.5–5.2)
RBC # BLD: 4.27 MILL/MM3 (ref 4.7–6.1)
REASON FOR REJECTION: NORMAL
REJECTED TEST: NORMAL
SEG NEUTROPHILS: 91.4 %
SEGMENTED NEUTROPHILS ABSOLUTE COUNT: 11.8 THOU/MM3 (ref 1.8–7.7)
SODIUM BLD-SCNC: 136 MEQ/L (ref 135–145)
WBC # BLD: 12.9 THOU/MM3 (ref 4.8–10.8)

## 2022-11-28 PROCEDURE — 85025 COMPLETE CBC W/AUTO DIFF WBC: CPT

## 2022-11-28 PROCEDURE — 6370000000 HC RX 637 (ALT 250 FOR IP): Performed by: INTERNAL MEDICINE

## 2022-11-28 PROCEDURE — G0378 HOSPITAL OBSERVATION PER HR: HCPCS

## 2022-11-28 PROCEDURE — 97116 GAIT TRAINING THERAPY: CPT

## 2022-11-28 PROCEDURE — 36415 COLL VENOUS BLD VENIPUNCTURE: CPT

## 2022-11-28 PROCEDURE — 80048 BASIC METABOLIC PNL TOTAL CA: CPT

## 2022-11-28 PROCEDURE — 97161 PT EVAL LOW COMPLEX 20 MIN: CPT

## 2022-11-28 PROCEDURE — 94640 AIRWAY INHALATION TREATMENT: CPT

## 2022-11-28 PROCEDURE — 83605 ASSAY OF LACTIC ACID: CPT

## 2022-11-28 RX ORDER — FUROSEMIDE 20 MG/1
20 TABLET ORAL
Qty: 90 TABLET | Refills: 0
Start: 2022-11-28

## 2022-11-28 RX ORDER — TIOTROPIUM BROMIDE 18 UG/1
18 CAPSULE ORAL; RESPIRATORY (INHALATION) DAILY
Qty: 90 CAPSULE | Refills: 1 | Status: SHIPPED | OUTPATIENT
Start: 2022-11-28

## 2022-11-28 RX ORDER — ALBUTEROL SULFATE 90 UG/1
2 AEROSOL, METERED RESPIRATORY (INHALATION) 4 TIMES DAILY PRN
Qty: 54 G | Refills: 0 | Status: SHIPPED | OUTPATIENT
Start: 2022-11-28

## 2022-11-28 RX ORDER — PREDNISONE 20 MG/1
40 TABLET ORAL DAILY
Qty: 6 TABLET | Refills: 0 | Status: SHIPPED | OUTPATIENT
Start: 2022-11-29 | End: 2022-12-02

## 2022-11-28 RX ADMIN — IPRATROPIUM BROMIDE AND ALBUTEROL SULFATE 1 AMPULE: .5; 3 SOLUTION RESPIRATORY (INHALATION) at 09:10

## 2022-11-28 RX ADMIN — Medication 100 MG: at 10:39

## 2022-11-28 RX ADMIN — Medication 100 MG: at 00:08

## 2022-11-28 RX ADMIN — PREDNISONE 40 MG: 20 TABLET ORAL at 10:40

## 2022-11-28 NOTE — PLAN OF CARE
Problem: Discharge Planning  Goal: Discharge to home or other facility with appropriate resources  11/28/2022 1256 by Nancy Rodriguez RN  Outcome: Completed  11/28/2022 0415 by Vicki Rosen RN  Flowsheets (Taken 11/27/2022 2200)  Discharge to home or other facility with appropriate resources:   Identify barriers to discharge with patient and caregiver   Arrange for needed discharge resources and transportation as appropriate   Identify discharge learning needs (meds, wound care, etc)   Refer to discharge planning if patient needs post-hospital services based on physician order or complex needs related to functional status, cognitive ability or social support system     Problem: Respiratory - Adult  Goal: Achieves optimal ventilation and oxygenation  11/28/2022 1256 by Nancy Rodriguez RN  Outcome: Completed  11/28/2022 0957 by Michaela Orozco RCP  Outcome: Progressing   Care plan reviewed with patient. Patient verbalizes understanding of the care plan and contributed to goal setting.

## 2022-11-28 NOTE — PLAN OF CARE
Problem: Discharge Planning  Goal: Discharge to home or other facility with appropriate resources  Flowsheets (Taken 11/27/2022 2200)  Discharge to home or other facility with appropriate resources:   Identify barriers to discharge with patient and caregiver   Arrange for needed discharge resources and transportation as appropriate   Identify discharge learning needs (meds, wound care, etc)   Refer to discharge planning if patient needs post-hospital services based on physician order or complex needs related to functional status, cognitive ability or social support system

## 2022-11-28 NOTE — RT PROTOCOL NOTE
RT Inhaler-Nebulizer Bronchodilator Protocol Note    There is a bronchodilator order in the chart from a provider indicating to follow the RT Bronchodilator Protocol and there is an Initiate RT Inhaler-Nebulizer Bronchodilator Protocol order as well (see protocol at bottom of note). CXR Findings:  XR CHEST PORTABLE    Result Date: 11/27/2022  Stable radiographic appearance of the chest. No evidence of an acute process. **This report has been created using voice recognition software. It may contain minor errors which are inherent in voice recognition technology. ** Final report electronically signed by Dr. Aline Batista on 11/27/2022 12:36 PM      The findings from the last RT Protocol Assessment were as follows:   History Pulmonary Disease: Chronic pulmonary disease  Respiratory Pattern: Dyspnea on exertion or RR 21-25 bpm  Breath Sounds: Slightly diminished and/or crackles  Cough: Strong, spontaneous, non-productive  Indication for Bronchodilator Therapy: Decreased or absent breath sounds  Bronchodilator Assessment Score: 6    Aerosolized bronchodilator medication orders have been revised according to the RT Inhaler-Nebulizer Bronchodilator Protocol below. Respiratory Therapist to perform RT Therapy Protocol Assessment initially then follow the protocol. Repeat RT Therapy Protocol Assessment PRN for score 0-3 or on second treatment, BID, and PRN for scores above 3. No Indications - adjust the frequency to every 6 hours PRN wheezing or bronchospasm, if no treatments needed after 48 hours then discontinue using Per Protocol order mode. If indication present, adjust the RT bronchodilator orders based on the Bronchodilator Assessment Score as indicated below.   Use Inhaler orders unless patient has one or more of the following: on home nebulizer, not able to hold breath for 10 seconds, is not alert and oriented, cannot activate and use MDI correctly, or respiratory rate 25 breaths per minute or more, then use the equivalent nebulizer order(s) with same Frequency and PRN reasons based on the score. If a patient is on this medication at home then do not decrease Frequency below that used at home. 0-3 - enter or revise RT bronchodilator order(s) to equivalent RT Bronchodilator order with Frequency of every 4 hours PRN for wheezing or increased work of breathing using Per Protocol order mode. 4-6 - enter or revise RT Bronchodilator order(s) to two equivalent RT bronchodilator orders with one order with BID Frequency and one order with Frequency of every 4 hours PRN wheezing or increased work of breathing using Per Protocol order mode. 7-10 - enter or revise RT Bronchodilator order(s) to two equivalent RT bronchodilator orders with one order with TID Frequency and one order with Frequency of every 4 hours PRN wheezing or increased work of breathing using Per Protocol order mode. 11-13 - enter or revise RT Bronchodilator order(s) to one equivalent RT bronchodilator order with QID Frequency and an Albuterol order with Frequency of every 4 hours PRN wheezing or increased work of breathing using Per Protocol order mode. Greater than 13 - enter or revise RT Bronchodilator order(s) to one equivalent RT bronchodilator order with every 4 hours Frequency and an Albuterol order with Frequency of every 2 hours PRN wheezing or increased work of breathing using Per Protocol order mode. RT to enter RT Home Evaluation for COPD & MDI Assessment order using Per Protocol order mode.     Electronically signed by Halie Head RCP on 11/27/2022 at 8:25 PM

## 2022-11-28 NOTE — PROGRESS NOTES
IM Progress Note  Dr. Christine Anderson  11/28/2022 10:15 AM      Patient name Flaco Carvajal  [id number]  PCP: Adán Lion MD  Admit Date: 11/27/2022  Acct No. [de-identified]    Subjective: Interval History:   Pt sitting up in chair  Off O2  Feels amanda  Wants to go home   Says he cannot stay any longer        Diet: ADULT DIET; Regular; Low Fat/Low Chol/High Fiber/JUDY    I/O last 3 completed shifts:  In: -   Out: 500 [Urine:500]  No intake/output data recorded. Admission weight: 230 lb (104.3 kg) as of 11/27/2022 11:20 AM  Wt Readings from Last 3 Encounters:   11/28/22 211 lb 11.2 oz (96 kg)   03/10/22 231 lb (104.8 kg)   10/12/21 225 lb 8 oz (102.3 kg)     Body mass index is 30.38 kg/m². ROS   CVS;  no cp or palpitation  Resp: no SOB or cough  Neuro:  No numbness or weakness or dizziness  Abd: no nausea or vomiting or abd pain      Medications:   Scheduled Meds:   predniSONE  40 mg Oral Daily    enoxaparin  40 mg SubCUTAneous Daily    sodium chloride flush  5-40 mL IntraVENous 2 times per day    thiamine  100 mg Oral Daily    ipratropium-albuterol  1 ampule Inhalation BID     Continuous Infusions:   sodium chloride 100 mL/hr at 11/27/22 2236    sodium chloride         Labs :     CBC:   Recent Labs     11/27/22  1130 11/28/22  0411   WBC 7.2 12.9*   HGB 17.8 14.7    129*     BMP:    Recent Labs     11/27/22  1130 11/28/22  0611   * 136   K 3.7 4.7   CL 94* 103   CO2 22* 22*   BUN 11 11   CREATININE 1.2 0.8   GLUCOSE 134* 140*     Hepatic: No results for input(s): AST, ALT, ALB, BILITOT, ALKPHOS in the last 72 hours. Troponin: No results for input(s): TROPONINI in the last 72 hours. BNP: No results for input(s): BNP in the last 72 hours. Lipids: No results for input(s): CHOL, HDL in the last 72 hours. Invalid input(s): LDLCALCU  INR: No results for input(s): INR in the last 72 hours.     Radiology    Objective:   Vitals: /67   Pulse 88   Temp 98.3 °F (36.8 °C) (Oral)   Resp 16 Ht 5' 10\" (1.778 m)   Wt 211 lb 11.2 oz (96 kg)   SpO2 96%   BMI 30.38 kg/m²   HEENT: Head:pupils react  Neck: supple  Lungs: clear to auscultation wit no rales or rhonchi  Heart: regular rate and rhythm   Abdomen: soft BS heard NG NT no rebound  Extremities: warm  edema not new  Neurologic:  Alert, oriented X3    Impression:   :   COPD exacerbation  Lactic acidosis suspect poor po intake  Hyponatremia resolved  Alcohol abuse  CHF secondary to diastolic dysfunction  Hyperlipidemia  Hypothyroidism  Nicotine use  8mm cavitary lesion  coronary artery disease with prior cardiac stents    Plan:    Noted acidosis resolved  Pt eager to go home understands  the implications of discharging today with clear source for his acidosis other than possible poor po intake  Pt on room air  Walked in hallway   Pt says he can become restless if not discharged home   Pt to f/u on office in 2 days    Ruma Lovelace MD, MD

## 2022-11-28 NOTE — ED NOTES
Pt given box lunch and diet ginger ale at this time. Denies other needs.       Kan Pascual, ANNE-MARIE  11/27/22 2107       Kan Pascual, ANNE-MARIE  11/27/22 2107

## 2022-11-28 NOTE — PROGRESS NOTES
Physician Progress Note      Lexa CUEVA  CSN #:                  [id number]  :                       1945  ADMIT DATE:       2022 11:20 AM  DISCH DATE:  RESPONDING  PROVIDER #:        Adán Lion MD          QUERY TEXT:    Pt admitted with SOB with Exac of COPD. Pt noted to have SIRS/Sepsis Criteria:   LA 2.7/4.4/4.6, WBC 12.9, procal 0.02, tachycardia and tachypnea noted and   placed on Rocephin. CT ABDOMEN PELVIS   Bilateral nonobstructive   nephrolithiasis. and Sigmoid colon diverticulosis. If possible, please   document in the progress notes and discharge summary if you are evaluating and   /or treating any of the following: The medical record reflects the following:  Risk Factors: CT ABDOMEN PELVIS   Bilateral nonobstructive nephrolithiasis. Sigmoid colon diverticulosis. Clinical Indicators: LA 2.7/4.4/4.6, WBC 12.9, procal 0.02, tachycardia and   tachypnea noted  Treatment: Rocephin    Thank you. Please call if you have any questions. P) [phone]. Signed   by Gus Merrill RN Clinical , CRCR  Options provided:  -- Sepsis confirmed after study and was present on admission  -- Sepsis treated and resolved  -- No Sepsis, localized infection only  -- Sepsis was ruled out  -- Sepsis, not present on admission  -- Other - I will add my own diagnosis  -- Disagree - Not applicable / Not valid  -- Disagree - Clinically unable to determine / Unknown  -- Refer to Clinical Documentation Reviewer    PROVIDER RESPONSE TEXT:    This patient has sepsis that was not present on admission. Query created by: Bartolome Frye on 2022 11:19 AM      QUERY TEXT:    Pt admitted with Exac of COPD and has \"CHF secondary to diastolic dysfunction\"    documented.  If possible, please document in progress notes and discharge   summary further specificity regarding the type and acuity of CHF:    The medical record reflects the following:  Risk Factors: SOB  Clinical Indicators: .4  Last ECHO 3-9-21: Ejection fraction was estimated at 55-60%. Treatment: Home meds , daily weights and accurate I/O    Thank you. Please call if you have any questions. (P) [phone]. Signed   by Deanna Melvin RN Clinical , CRCR  Options provided:  -- Chronic Diastolic CHF/HFpEF  -- Acute on Chronic Diastolic CHF/HFpEF  -- Acute Diastolic CHF/HFpEF  -- Other - I will add my own diagnosis  -- Disagree - Not applicable / Not valid  -- Disagree - Clinically unable to determine / Unknown  -- Refer to Clinical Documentation Reviewer    PROVIDER RESPONSE TEXT:    This patient has chronic diastolic CHF/HFpEF.     Query created by: Elisabeth Vázquez on 11/28/2022 11:21 AM      Electronically signed by:  Silvio Durant MD 11/28/2022 11:24 AM

## 2022-11-28 NOTE — PLAN OF CARE
Problem: Respiratory - Adult  Goal: Achieves optimal ventilation and oxygenation  Outcome: Progressing   Patient mutually agreed on goals.

## 2022-11-28 NOTE — ED NOTES
Pt resting in bed. Resp regular. Denies all needs. UPdated on admission. Verbalized understanding. Call light in reach.       Nadia Méndez RN  11/27/22 1910

## 2022-11-28 NOTE — DISCHARGE SUMMARY
Discharge Summary    Date:11/28/2022        Patient Lizy Cadet     YOB: 1945     Age:77 y.o. Admit Date:11/27/2022   Admission Condition:fair   Discharged Condition:stable  Discharge Date: 11/28/22     Discharge Diagnoses   COPD exacerbation  Lactic acidosis suspect poor po intake  Hyponatremia resolved  Alcohol abuse  CHF secondary to diastolic dysfunction  Hyperlipidemia  Hypothyroidism  Nicotine use  8mm cavitary lesion  coronary artery disease with prior cardiac stents      Hospital Stay   Narrative of Hospital Course:   61-year-old male with past medical history of coronary disease prior cardiac stents history of CHF with preserved ejection fraction hypothyroidism hyperlipidemia known history of 8 mm cavitary lesion for which patient still did not decide to go see the pulmonologist presents to the ER with complaints of increasing fatigue. He denies being short of breath but work-up in the ER he was hypoxic needing supplemental oxygen. He did mention he was short of breath to ER staff he denies any fever or chills he always has a dry cough from his smoking he still continues to smoke at least 2 packs a day even though his been encouraged on multiple occasions to quit both smoking smoking and alcohol. Patient was  Oxygen. He did receive breathing treatments and steroids. He was also noted to have elevated lactic acid with unclear reason. He was started on IV fluids UA and chest x-ray were unremarkable. Patient does have a cavitary lesion for which he has been recommended to see pulmonary on multiple occasions in the past.  He still not interested in seeing pulmonary during this hospital admission. Patient was able to weaned off his oxygen he is ambulating in the hallway. Patient is quite anxious to be discharged. He said he becomes restless if not discharged. He was placed on DT precautions but did not require any Ativan. Patient had no fever no elevated white count.   Sodium was stable at discharge. Abdomen was soft CT abdomen was unremarkable. Patient will follow-up in the office in 2 days has been encouraged to increase her oral intake suspect  poor oral intake to have contributed to his acidosis. Consultants:   IP CONSULT TO SOCIAL WORK  IP CONSULT TO ADDICTION SERVICES    Time Spent on Discharge:  35 minutes were spent in patient examination, evaluation, counseling as well as medication reconciliation, prescriptions for required medications, discharge plan and follow up.         Significant Diagnostic Studies:   Recent Labs:  CBC:   Lab Results   Component Value Date/Time    WBC 12.9 11/28/2022 04:11 AM    RBC 4.27 11/28/2022 04:11 AM    RBC 0 11/04/2021 07:05 AM    HGB 14.7 11/28/2022 04:11 AM    HCT 43.0 11/28/2022 04:11 AM    .7 11/28/2022 04:11 AM    MCH 34.4 11/28/2022 04:11 AM    MCHC 34.2 11/28/2022 04:11 AM    RDW 13.4 06/08/2021 07:09 AM     11/28/2022 04:11 AM     BMP:    Lab Results   Component Value Date/Time    GLUCOSE 140 11/28/2022 06:11 AM    GLUCOSE 108 01/08/2022 09:45 AM     11/28/2022 06:11 AM    K 4.7 11/28/2022 06:11 AM    K 3.7 11/27/2022 11:30 AM     11/28/2022 06:11 AM    CO2 22 11/28/2022 06:11 AM    ANIONGAP 11.0 11/28/2022 06:11 AM    BUN 11 11/28/2022 06:11 AM    CREATININE 0.8 11/28/2022 06:11 AM    CALCIUM 8.5 11/28/2022 06:11 AM    LABGLOM >60 11/28/2022 06:11 AM     HFP:    Lab Results   Component Value Date/Time    PROT 7.4 01/08/2022 09:45 AM     CMP:    Lab Results   Component Value Date/Time    GLUCOSE 140 11/28/2022 06:11 AM    GLUCOSE 108 01/08/2022 09:45 AM     11/28/2022 06:11 AM    K 4.7 11/28/2022 06:11 AM    K 3.7 11/27/2022 11:30 AM     11/28/2022 06:11 AM    CO2 22 11/28/2022 06:11 AM    BUN 11 11/28/2022 06:11 AM    CREATININE 0.8 11/28/2022 06:11 AM    ANIONGAP 11.0 11/28/2022 06:11 AM    ALKPHOS 83 01/08/2022 09:45 AM    ALKPHOS 90 08/24/2021 09:40 AM    ALT 51 01/08/2022 09:45 AM    AST 46 01/08/2022 09:45 AM    BILITOT 0.6 01/08/2022 09:45 AM    BILITOT Negative 01/08/2022 09:45 AM    LABALBU 4.0 01/08/2022 09:45 AM    LABGLOM >60 11/28/2022 06:11 AM    PROT 7.4 01/08/2022 09:45 AM    CALCIUM 8.5 11/28/2022 06:11 AM     PT/INR:    Lab Results   Component Value Date/Time    INR 1.00 01/26/2018 04:22 AM     PTT: No results found for: APTT  FLP:    Lab Results   Component Value Date/Time    CHOL 117 06/08/2021 07:09 AM    CHOL 109 05/29/2018 06:22 AM    TRIG 85 06/08/2021 07:09 AM    HDL 61 06/08/2021 07:09 AM     U/A:    Lab Results   Component Value Date/Time    COLORU YELLOW 11/27/2022 12:10 PM    SPECGRAV 1.011 12/08/2017 07:02 AM    PHUR 7.5 11/27/2022 12:10 PM    PROTEINU NEGATIVE 11/27/2022 12:10 PM    GLUCOSEU NEGATIVE 11/27/2022 12:10 PM    KETUA TRACE 11/27/2022 12:10 PM    BILIRUBINUR NEGATIVE 11/27/2022 12:10 PM    UROBILINOGEN 1.0 11/27/2022 12:10 PM    NITRU NEGATIVE 11/27/2022 12:10 PM    LEUKOCYTESUR MODERATE 11/27/2022 12:10 PM     TSH:    Lab Results   Component Value Date/Time    TSH 1.430 08/24/2021 09:40 AM       Radiology Last 7 Days:  CT ABDOMEN PELVIS W IV CONTRAST Additional Contrast? None    Result Date: 11/27/2022  1. No acute intra-abdominal or intrapelvic findings. 2. Bilateral nonobstructive nephrolithiasis. 3. Sigmoid colon diverticulosis. Final report electronically signed by Dr. Anthony Shipman on 11/27/2022 5:26 PM    XR CHEST PORTABLE    Result Date: 11/27/2022  Stable radiographic appearance of the chest. No evidence of an acute process. **This report has been created using voice recognition software. It may contain minor errors which are inherent in voice recognition technology. ** Final report electronically signed by Dr. Marco Quinones on 11/27/2022 12:36 PM      Discharge Plan   Disposition: Home    Provider Follow-Up:   Emmanuel Damon MD  [address]  [phone]    Follow up in 2 day(s)      Emmanuel Damon MD  [id number] [facility]  [address]  [address] [address]  [phone]             Hospital/Incidental Findings Requiring Follow-Up:      Patient Instructions   Diet: cardiac diet    Activity: activity as tolerated    Other Instructions:       Discharge Medications         Medication List        START taking these medications      albuterol sulfate  (90 Base) MCG/ACT inhaler  Commonly known as: Ventolin HFA  Inhale 2 puffs into the lungs 4 times daily as needed for Wheezing     aspirin 81 MG chewable tablet  Take 1 tablet by mouth daily     nitroGLYCERIN 0.4 MG SL tablet  Commonly known as: Nitrostat  Place 1 tablet under the tongue every 5 minutes as needed for Chest pain up to max of 3 total doses. If no relief after 1 dose, call 911. predniSONE 20 MG tablet  Commonly known as: DELTASONE  Take 2 tablets by mouth daily for 3 days  Start taking on: November 29, 2022     Spiriva HandiHaler 18 MCG inhalation capsule  Generic drug: tiotropium  Inhale 1 capsule into the lungs daily            CHANGE how you take these medications      furosemide 20 MG tablet  Commonly known as: LASIX  Take 1 tablet by mouth three times a week  What changed: See the new instructions.             CONTINUE taking these medications      amLODIPine 5 MG tablet  Commonly known as: NORVASC  TAKE 1 TABLET BY MOUTH ONE TIME A DAY     atorvastatin 80 MG tablet  Commonly known as: LIPITOR  TAKE 1 TABLET BY MOUTH EVERY DAY AT NIGHT     B1 100 MG Tabs     Blood Pressure Monitor Automat Valorie  1 applicator by Does not apply route daily     escitalopram 10 MG tablet  Commonly known as: LEXAPRO     folic acid 1 MG tablet  Commonly known as: FOLVITE  Take 1 tablet by mouth daily     levothyroxine 25 MCG tablet  Commonly known as: SYNTHROID     prasugrel 10 MG Tabs  Commonly known as: EFFIENT  Take 1 tablet by mouth daily     sodium chloride 1 g tablet  Take 1 tablet by mouth 2 times daily (with meals)     tamsulosin 0.4 MG capsule  Commonly known as: FLOMAX            STOP taking these medications      potassium chloride 10 MEQ extended release tablet  Commonly known as: KLOR-CON M               Where to Get Your Medications        These medications were sent to [address] [place] [address] , [address]  [address] [place], [place] [address] [address]      Phone: [phone]   albuterol sulfate  (90 Base) MCG/ACT inhaler  predniSONE 20 MG tablet  Spiriva HandiHaler 18 MCG inhalation capsule       Information about where to get these medications is not yet available    Ask your nurse or doctor about these medications  furosemide 20 MG tablet         Electronically signed by Frances Carlton MD on 11/28/22 at 10:30 AM EST

## 2022-11-28 NOTE — CARE COORDINATION
Case Management Assessment  Initial Evaluation    Date/Time of Evaluation: 11/28/2022 12:52 PM  Assessment Completed by: Cleveland Mendiola RN    If patient is discharged prior to next notation, then this note serves as note for discharge by case management. Patient Name: Faviola Donald                   YOB: 1945  Diagnosis: Acute respiratory distress [R06.03]  Lactic acidosis [E87.20]  Hypoxia [R09.02]  Generalized weakness [R53.1]                   Date / Time: 11/27/2022 11:20 AM    Patient Admission Status: Inpatient     Current PCP: Jody Ascencio MD  PCP verified by CM? Yes    Chart Reviewed: Yes      Patient Orientation: Alert and Oriented    Patient Cognition: Alert  History Provided by: Patient    Hospitalization in the last 30 days (Readmission):  No    If yes, Readmission Assessment in  Navigator will be completed. Advance Directives:     Code Status: Prior     Primary Decision Maker: Alin Nguyen - [phone]    Discharge Planning  Patient lives with: Alone Type of Home: Trailer/Mobile Home (Double wide)   Primary Caregiver: Self  Patient Support Systems include: Children, Family Members   Current Financial resources: Medicare  Current community resources: Other (Comment) (None)  Current services prior to admission: Durable Medical Equipment [employer], walker)   Type of Home Care services:  None    ADLS  Prior functional level: Independent in ADLs/IADLs  Current functional level: Independent in ADLs/IADLs      Family can provide assistance at DC: Yes  Would you like Case Management to discuss the discharge plan with any other family members/significant others, and if so, who?  No  Plans to Return to Present Housing: Yes  Other Identified Issues/Barriers to RETURNING to current housing: None  Potential Assistance needed at discharge: N/A  Patient expects to discharge to: Trailer/mobile home (Double wide)  Plan for transportation at discharge: Friends    Financial  Payor: Saba May / Plan: Aminata Zuniga PPO / Product Type: Medicare /     Does insurance require precert for SNF: Yes    Potential assistance Purchasing Medications: No  Meds-to-Beds request: Yes      [id number] [place], [address] [phone] [address] [facility] [address]  Phone: [phone] Fax: [phone]      Factors facilitating achievement of predicted outcomes: Cooperative, Pleasant, Sense of humor, and Has needed Durable Medical Equipment at home    Barriers to discharge: None, planning home today. Additional Case Management Notes: Admitted through ED with fatigue. History of 8mm lung lesion, pt never followed with Pulmonology. Pt smokes 2 ppd and drinks 5-6 alcoholic drinks a day. Was started on 2L/nc upon admission, now on room air. Sats 96%. WBC 12.9. Lactic acid was 5.8 high, now 1.4. IVF, Lovenox. DuoNeb bid and prn. PT/OT. Procedure:   11/27 CT abd/pelvis: No acute intra-abdominal or intrapelvic findings. Bilateral nonobstructive nephrolithiasis. Sigmoid colon diverticulosis. The Plan for Transition of Care is related to the following treatment goals of Acute respiratory distress [R06.03]  Lactic acidosis [E87.20]  Hypoxia [R09.02]  Generalized weakness [R53.1]    Patient Goals/Plan/Treatment Preferences: Pt lives at home alone. Plans to return home today. Denies needs. Transportation/Food Security/Housekeeping Addressed: No issues identified. Destiney Mantilla RN  Case Management Department    11/28/22, 12:53 PM EST    Patient goals/plan/ treatment preferences discussed by  and . Patient goals/plan/ treatment preferences reviewed with patient/ family. Patient/ family verbalize understanding of discharge plan and are in agreement with goal/plan/treatment preferences. Understanding was demonstrated using the teach back method.   AVS provided by RN at time of discharge, which includes all necessary medical information pertaining to the patients current course of illness, treatment, post-discharge goals of care, and treatment preferences.      Services At/After Discharge: None       IMM Letter  IMM Letter given to Patient/Family/Significant other/Guardian/POA/by[de-identified] Pt Access  IMM Letter date given[de-identified] 11/28/22  IMM Letter time given[de-identified] 3870

## 2022-11-28 NOTE — PROGRESS NOTES
CLINICAL PHARMACY: DISCHARGE MED RECONCILIATION/REVIEW    [facility]) Select Patient?: Yes  Total # of Interventions Recommended: 0   -   Total # Interventions Accepted: 0  Intervention Severity:   - Level 1 Intervention Present?: No   - Level 2 #: 0   - Level 3 #: 0   Time Spent (min): 15    Additional Documentation:

## 2022-11-28 NOTE — PROGRESS NOTES
1023  Arrived to complete SBIRT per AOD consult. Pt in bathroom and informs this writer he is in the process of taking a shower. HALLEY to re-attempt.

## 2022-11-28 NOTE — PROGRESS NOTES
[address] [address]  INPATIENT PHYSICAL THERAPY  EVALUATION  [facility]Z CCU-STEPDOWN 3B - 3B-29/029-A    Time In: 0818  Time Out: 2780  Timed Code Treatment Minutes: 8 Minutes  Minutes: 16          Date: 2022  Patient Name: Jens Dwyer,  Gender:  male        MRN: [id number]  : 1945  (78 y.o.)      Referring Practitioner: Bailey Glover MD  Diagnosis: acute respiratory distress  Additional Pertinent Hx: Per EMR \"78year-old male with past medical history of coronary disease prior cardiac stents history of CHF with preserved ejection fraction hypothyroidism hyperlipidemia known history of 8 mm cavitary lesion for which patient still did not decide to go see the pulmonologist presents to the ER with complaints of increasing fatigue. He denies being short of breath but work-up in the ER he was hypoxic needing supplemental oxygen. He did mention he was short of breath to ER staff he denies any fever or chills he always has a dry cough from his smoking he still continues to smoke at least 2 packs a day even though his been encouraged on multiple occasions to quit both smoking smoking and alcohol  He also drinks at least 5-6 drinks a day his last alcoholic drink was last night. He denies any recent falls. He does have chronic swelling in his lower extremities. He did have hyponatremia for which he is on sodium tablets. He denies any chest pain palpitations no unusual bleeding. He is also seen cardiology for his bradycardia that he had in the past .no syncope. \"     Restrictions/Precautions:  Restrictions/Precautions: Fall Risk, General Precautions    Subjective:  Chart Reviewed: Yes  Patient assessed for rehabilitation services?: Yes  Family / Caregiver Present: No  Subjective: Ok to see pt per nursing. Pt in bathroom with nursing present when PT arrived, agreeable to PT session and mobility.     General:  Overall Orientation Status: Within Normal Limits  Orientation Level: Oriented X4  Vision: Impaired  Vision Exceptions: Wears glasses for reading  Hearing: Within functional limits       Pain: denies    Vitals: Vitals not assessed per clinical judgement, see nursing flowsheet    Social/Functional History:    Lives With: Alone  Type of Home: Trailer  Home Layout: One level  Home Access: Stairs to enter with rails  Entrance Stairs - Number of Steps: 4 MATHEW  Entrance Stairs - Rails: Both  Home Equipment: [place], rolling, Grab bars     Bathroom Shower/Tub: Tub/Shower unit, Walk-in shower  Bathroom Toilet: Standard  Bathroom Equipment: Shower chair, Grab bars in shower  IADL Comments: daughter and/or grandaughter complete grocery shopping lately and complete cleaning tasks       ADL Assistance: Independent  Homemaking Assistance: Needs assistance  Ambulation Assistance: Independent  Transfer Assistance: Independent    Active : Yes     Additional Comments: Pt amb with intermittent use of SC    OBJECTIVE:  Range of Motion:  Bilateral Lower Extremity: WNL    Strength:  Bilateral Lower Extremity: WFL    Balance:  Static Sitting Balance:  Modified Independent  Dynamic Sitting Balance: Modified Independent  Static Standing Balance: Supervision, Stand By Assistance  Dynamic Standing Balance: Stand By Assistance  No LOB noted with mobility, good safety  Bed Mobility:  Not Tested    Transfers:  Sit to Stand: Supervision, Stand By Assistance, X 1  Stand to [address] [place] [address], Stand By Assistance, X 1  SC for support as needed, no LOB noted  Ambulation:  Stand By Assistance, X 1  Distance: 150 feet  Surface: Level Tile  Device:Cane  Gait Deviations:  Slow Rosalba, Decreased Step Length Bilaterally, and Decreased Gait Speed  No LOB noted, good safety    Functional Outcome Measures: Completed  AM-PAC Inpatient Mobility Raw Score : 20  AM-PAC Inpatient T-Scale Score : 47.67    ASSESSMENT:  Activity Tolerance:  Patient tolerance of  treatment: good.      Treatment Initiated: Treatment and education initiated within context of evaluation. Evaluation time included review of current medical information, gathering information related to past medical, social and functional history, completion of standardized testing, formal and informal observation of tasks, assessment of data and development of plan of care and goals. Treatment time included skilled education and facilitation of tasks to increase safety and independence with functional mobility for improved independence and quality of life. Assessment: Body Structures, Functions, Activity Limitations Requiring Skilled Therapeutic Intervention: Decreased functional mobility , Decreased strength, Decreased balance, Decreased endurance  Assessment: Pt presents with the deficits above, requires 1 person assist for functional tasks with use of SC for support. Pt cont to require skilled PT services to increase IND with functional tasks to increase IND with mobility and return home safely. Therapy Prognosis: Good    Requires PT Follow-Up: Yes    Discharge Recommendations:  Discharge Recommendations: Continue to assess pending progress, Home with assist PRN    Patient Education:      .     Patient Education  Education Given To: Patient  Education Provided: Role of Therapy, Plan of Care  Education Method: Verbal  Education Outcome: Verbalized understanding, Demonstrated understanding, Continued education needed       Equipment Recommendations:  Equipment Needed: No    Plan:  Current Treatment Recommendations: Strengthening, Balance training, Gait training, Functional mobility training, Stair training, Transfer training, Home exercise program, Return to work related activity, Equipment evaluation, education, & procurement, Patient/Caregiver education & training, Therapeutic activities, Safety education & training, Endurance training  General Plan:  (3-5x GM)    Goals:  Patient Goals : \"be able to get up and move around without being worn out\"  Short Term Goals  Time Frame for Short Term Goals: by discharge  Short Term Goal 1: Pt will amb for >200 feet with IND with SC for support to return home safely. Short Term Goal 2: Pt will negotiate steps with B rail and IND to return home safely. Short Term Goal 3: Pt will demo IND with transfers with SC for support as needed to progress towards PLOF. Long Term Goals  Time Frame for Long Term Goals : NA due to short ELOS    Following session, patient left in safe position with all fall risk precautions in place. In chair, all needs and call light in reach.

## 2022-11-29 ENCOUNTER — CARE COORDINATION (OUTPATIENT)
Dept: CASE MANAGEMENT | Age: 77
End: 2022-11-29

## 2022-11-29 DIAGNOSIS — E87.20 LACTIC ACIDOSIS: Primary | ICD-10-CM

## 2022-11-29 PROCEDURE — 1111F DSCHRG MED/CURRENT MED MERGE: CPT | Performed by: INTERNAL MEDICINE

## 2022-11-29 NOTE — CARE COORDINATION
[address] Care Transitions Initial Follow Up Call    Call within 2 business days of discharge: Yes    Care Transition Nurse contacted the patient by telephone to perform post hospital discharge assessment. Verified name and  with patient as identifiers. Provided introduction to self, and explanation of the Care Transition Nurse role. Patient: Faviola Donald Patient : 1945   MRN: <[id number]>  Reason for Admission: Lactic Acidosis, Resp Distress , Hypoxia  Discharge Date: 22 RARS: Readmission Risk Score: 11.2      Last Discharge  [address]       Date Complaint Diagnosis Description Type Department Provider    22 Shortness of Breath; Fatigue Lactic acidosis . .. ED to Hosp-Admission (Discharged) (ADMITTED) Ronaldo Delgado MD; Thor Pena. .. PMH: COPD, cigarette smoker, Lung lesion, Bradycardia, CHF    Was this an external facility discharge? No Discharge Facility: [address] [place] [address] to be reviewed by the provider   Additional needs identified to be addressed with provider: No  none               Method of communication with provider: none. Spoke with Aung Gillespie, very pleasant, says he is feeling a little bit better, although says, \" I have a long way to go. \" Denies sob/henning/chest pain/edema. Speaking in clear complete sentences, no resp distress noted over the phone. Says he felt so weak & lethargic @ home before coming into the hospital which made it difficult for him to get up, which spiraled & he didn't eat. He says his appetite has been good all along. Says he has plenty of food @ home, his dtr Jens Almendarez makes sure of it. Pt says his dtr is a very good support to him, she always makes sure he has what he needs, but she works a lot. He lives alone in a double wide mobile home in [facility] [place], [place] with ADL's, drives. He is interested in getting someone to help him with light housekeeping @ home. Will send referral to  for resources/assistance.    Discussed CHF guidelines: ie importance of daily weights, when to notify provider for increased swelling & weight gain of 2-3 lbs in a 24 hr period & 5 lbs within 5-7 days. He said he used to be diligent with daily wts although no longer is. Encouraged him to weigh self daily & keep a log. He v/u. Dangelo Torrez says his PCP has put him on a fluid restriction, says only to drink a \"couple sips with his meds. \" Encouraged Dangelo Torrez to get specific instruction from PCP/Cardiologist appts. Dangelo Torrez says he is still smoking cigarettes, says \" at this point I will cut back but not quit all together. \"  Advised patient to quit and offered support. Discussed Pulm lesion as noted from PCP: advised Dangelo Torrez to d/w PCP @ HFU appt & f/up with Pulm as per PCP. Emphasized importance of HFU appt needed within 7 days. Dangelo Torrez already has PCP appt scheduled tomorrrow (11/30/22) Declines transportation resources, he will drive self to appt. Reviewed AVS & DC meds. Has  picked up & is taking/using Ventolin & Spiriva inhalers, ASA, Prednisone, NTG (has not needed ntg)  Noted change in Lasix. All other meds are same as PTA. Taking all meds as prescribed. Care Transition Nurse reviewed discharge instructions, medical action plan, and red flags with patient who verbalized understanding. The patient was given an opportunity to ask questions and does not have any further questions or concerns at this time. Were discharge instructions available to patient? Yes. Reviewed appropriate site of care based on symptoms and resources available to patient including: PCP  Specialist  Urgent care clinics  [facility] 24/7  When to call [address] The patient agrees to contact the PCP office for questions related to their healthcare. Advance Care Planning:   Does patient have an Advance Directive: not on file; education provided. Medication reconciliation was performed with patient, who verbalizes understanding of administration of home medications. Medications reviewed, 1111F entered: yes    Was patient discharged with a pulse oximeter? no    Non-face-to-face services provided:  Education of patient/family/caregiver/guardian to support self-management-chf/copd teaching  Assessment and support for treatment adherence and medication management-Med review    Offered patient enrollment in the Remote Patient Monitoring (RPM) program for in-home monitoring: NA.    Care Transitions 24 Hour Call    Schedule Follow Up Appointment with PCP: Completed  Do you have a copy of your discharge instructions?: Yes  Do you have all of your prescriptions and are they filled?: Yes  Have you been contacted by a [id number] [facility] Pharmacist?: No  Have you scheduled your follow up appointment?: Yes (Comment: PCP 11/30/22)  How are you going to get to your appointment?: Car - drive self  Do you feel like you have everything you need to keep you well at home?: Yes (Comment: Requesting info on light duty housekeeping few hrs every other week. Will refer to )  Care Transitions Interventions     Transportation Support: Declined      DME Assistance: Declined     Smoking Cessation: Completed      Social Work: Completed    Disease Association: Completed               Follow Up   PCP Dr Mehrdad Price appt 11/30/22      Care Transition Nurse provided contact information. Plan for follow-up call in 5-7 days based on severity of symptoms and risk factors. Plan for next call: symptom management-eating ok? Sob/henning? Swelling? Daily wts? change in wt?   follow-up appointment-11/30/22- how did PCP appt go? Pulm referral?    medication management-any med changes?      Daily Bar RN

## 2022-12-02 ENCOUNTER — CARE COORDINATION (OUTPATIENT)
Dept: CARE COORDINATION | Age: 77
End: 2022-12-02

## 2022-12-02 LAB
BLOOD CULTURE, ROUTINE: NORMAL
BLOOD CULTURE, ROUTINE: NORMAL

## 2022-12-02 NOTE — CARE COORDINATION
SW called pt after referral from [address] Pt in need of in home help with cleaning. Introduced self and my role. Pt stated, \"its not a good time to talk, I am out right now eating. \"  Informed pt that I will call back on Monday. Pt voiced understanding and appreciative. Plan of care:  Support pt in community  Provide resources.

## 2022-12-05 ENCOUNTER — CARE COORDINATION (OUTPATIENT)
Dept: CARE COORDINATION | Age: 77
End: 2022-12-05

## 2022-12-06 ENCOUNTER — CARE COORDINATION (OUTPATIENT)
Dept: CASE MANAGEMENT | Age: 77
End: 2022-12-06

## 2022-12-06 NOTE — CARE COORDINATION
[facility] Care Transitions Follow Up Call    Care Transition Nurse contacted the patient by telephone to follow up after admission on 22. Verified name and  with patient as identifiers. Patient: Jens Dwyer  Patient : 1945   MRN: <[id number]>  Reason for Admission:  Lactic Acidosis, Resp Distress , Hypoxia  Discharge Date: 22 RARS: Readmission Risk Score: 11.2      Needs to be reviewed by the provider   Additional needs identified to be addressed with provider: No  none             Method of communication with provider: none. Contacted patient for care transition follow up. He informed CTN that he would like to be called Luis Shore. Luis Shore states that he is not the greatest but taking it one day at a time. Reports decreased energy and weakness but trying to get out a little more. He is not overexerting himself and is aware of his limits. He denies having any c/o chest pain/discomfort, pressure, tightness. Reports his breathing is \"not too bad at all\". May get short of breath with exertion but otherwise breathing is back to baseline. May have an occasional cough. Reports the swelling in BLE which typically occur in his right ankle has decreased. Encouraged to keep legs elevated as much as possible when he notices swelling. He has not been monitoring his weight. States he has lost weight because he was not eating. Discussed reasoning and importance of monitoring weights daily and when to contact provider with a weight gain of 3 lbs within 24 hours or 5 lbs within a week. Also advised to monitor any significant weight loss and to report to provider. He verbalized understanding. Luis Shore became emotional at one point during our conversation. He informed CTN that it is almost his wife's birthday which always makes him sad. Spouse passed away in . CTN provided emotional support. He confirmed he followed up with PCP. Denies having any changes to his medications.   CTN informed him that [employer] mailed him a [employer] directory to assist him in finding in home help. He verbalized understanding and will be on the look out for it. He does not have any questions or needs at this time. Addressed changes since last contact:  none  Discussed follow-up appointments. If no appointment was previously scheduled, appointment scheduling offered: Yes. Is follow up appointment scheduled within 7 days of discharge? Yes. Follow Up  No future appointments. Care Transition Nurse reviewed medical action plan and red flags with patient and discussed any barriers to care and/or understanding of plan of care after discharge. Discussed appropriate site of care based on symptoms and resources available to patient including: PCP. The patient agrees to contact the PCP office for questions related to their healthcare. Advance Care Planning:   not on file. Patients top risk factors for readmission: lack of knowledge about disease and medical condition-Lactic Acidosis, COPD, CHF  Interventions to address risk factors: Education of patient/family/caregiver/guardian to support self-management-when to contact provider       Care Transitions Subsequent and Final Call    Subsequent and Final Calls  Do you have any ongoing symptoms?: Yes  Patient-reported symptoms: Shortness of Breath, Other  Have your medications changed?: No  Do you have any questions related to your medications?: No  Do you currently have any active services?: No  Do you have any needs or concerns that I can assist you with?: No  Care Transitions Interventions     Transportation Support: Declined      DME Assistance: Declined     Smoking Cessation: Completed      Social Work: Completed    Disease Association: Completed      Other Interventions:             Care Transition Nurse provided contact information for future needs. Plan for follow-up call in 5-7 days based on severity of symptoms and risk factors.   Plan for next call: symptom management-Energy level, weakness, SOB, Edema, Weight, Appetite    Perri Calvin RN

## 2022-12-09 RX ORDER — POTASSIUM CHLORIDE 750 MG/1
TABLET, EXTENDED RELEASE ORAL
Qty: 90 TABLET | Refills: 0 | OUTPATIENT
Start: 2022-12-09

## 2022-12-12 ENCOUNTER — CARE COORDINATION (OUTPATIENT)
Dept: CARE COORDINATION | Age: 77
End: 2022-12-12

## 2022-12-12 NOTE — CARE COORDINATION
SW called pt to follow up with with resource mailing. Pt stated he did receive the mailing. Discussed in home help and advised pt to contact [employer] and see if they can assist him. Advised pt to call [employer] if he needs anything fixed. Pt said his kids help but they have their own needs. Informed pt that I will follow up with him in  a couple weeks to see if he completed assessment from [facility]. Pt voiced understanding. Pt shared he goes to the [employer] on [facility] usually everyday for lunch, but doesn't feel like going today. Active listening provided. Plan of care:  Provide resources  Support pt in community  Assist with finding aide services.

## 2022-12-14 ENCOUNTER — CARE COORDINATION (OUTPATIENT)
Dept: CASE MANAGEMENT | Age: 77
End: 2022-12-14

## 2022-12-14 ENCOUNTER — CARE COORDINATION (OUTPATIENT)
Dept: CARE COORDINATION | Age: 77
End: 2022-12-14

## 2022-12-14 NOTE — CARE COORDINATION
Pt called SW today to discuss meals he is receiving. Pt stated they were to come last Sat. but did not come till yesterday, (Tues. ). Pt stated they were suppose to be packed in dry ice but were not and were 1/2 unthawed. Pt called the company,(he is not sure where they came from), other than the address was [facility] from [facility]. Pt unsure who set this up for him. SW inquired if it was when he was in the hospital but he was not sure. Unsure if ins. Is going to be paying for them. He stated the company asked if he would like replacement meals and pt said, no. Pt stated, \"I really don't need food, I can afford that. \" Peola Leaver him to call moms meals or [employer] if he changes his mind. Pt still very tired although he did go to the [employer] to day for lunch. Active listening provided. Discussed again calling his [employer] for volunteers who would be willing to bring him a meal once in awhile. Pt voiced understanding. Plan of care:  Support pt in community  Provide resources.

## 2022-12-15 ENCOUNTER — CARE COORDINATION (OUTPATIENT)
Dept: CARE COORDINATION | Age: 77
End: 2022-12-15

## 2022-12-15 NOTE — CARE COORDINATION
Care Transitions Outreach Attempt    1st attempt to reach for Care Transition follow up call unsuccessful. HIPAA compliant message left requesting call back to [phone]      Patient: Elizabeth Zheng Patient : 1945 MRN: <[id number]>    Last Discharge  [address]       Date Complaint Diagnosis Description Type Department Provider    22 Shortness of Breath; Fatigue Lactic acidosis . .. ED to Hosp-Admission (Discharged) (ADMITTED) Elana Chand MD; Robert Pena. .. Noted following upcoming appointments from discharge chart review:   [address] follow up appointment(s): No future appointments.   [facility] follow [facility] appointment(s): NA      Jay Ebscott RN -[phone]

## 2022-12-16 ENCOUNTER — TELEPHONE (OUTPATIENT)
Dept: CARDIOLOGY CLINIC | Age: 77
End: 2022-12-16

## 2022-12-16 NOTE — TELEPHONE ENCOUNTER
[employer]tr[employer] carelink linq remote       Pt had a 4 second pause on 12/15/22 at 22:31 pm      I called pt and he said he was in bed and asleep.  He said he did not wake up at all and had no symptoms    Told pt if he is every awake and he gets lt headed, dizzy or feels like passing out to call our office       Pt has no sleep apnea that he knows of and has never been tested / told him we will continue to monitor for now

## 2022-12-21 ENCOUNTER — CARE COORDINATION (OUTPATIENT)
Dept: CASE MANAGEMENT | Age: 77
End: 2022-12-21

## 2022-12-21 NOTE — CARE COORDINATION
Care Transitions Outreach Attempt    Attempted to reach patient for transitions of care follow up. Unable to reach patient. VM message left requesting call back to [phone]    Patient: Charli Soto Patient : 1945 MRN: <[id number]>    Last Discharge  Street       Date Complaint Diagnosis Description Type Department Provider    22 Shortness of Breath; Fatigue Lactic acidosis . .. ED to Hosp-Admission (Discharged) (ADMITTED) Oniel Springer MD; Alen Luna Ag. .. Noted following upcoming appointments from discharge chart review:   [facility] follow up appointment(s): No future appointments.   Non-[facility] follow up appointment(s): SAYDA Arce RN -[phone]

## 2022-12-22 ENCOUNTER — CARE COORDINATION (OUTPATIENT)
Dept: CARE COORDINATION | Age: 77
End: 2022-12-22

## 2022-12-22 NOTE — CARE COORDINATION
SW follow up with pt to see if he received the [employer] coupons I mailed last week. Pt stated he only gets his mail 1-2 x a week. Pt will call me if he doesn't get the coupons. Advised pt to take advantage of the [facility] Holiday meal on Sat. but pt stated \"if I need a meal my family is here to help me with anything. Active listening provided. Advised pt to call with any additional needs or concerns. Pt voiced understanding. Will resolve at this time.

## 2022-12-23 ENCOUNTER — CARE COORDINATION (OUTPATIENT)
Dept: CASE MANAGEMENT | Age: 77
End: 2022-12-23

## 2022-12-23 NOTE — CARE COORDINATION
[facility] Care Transitions Follow Up Call      Patient: Hero Zuluaga  Patient : 1945   MRN: <[id number]>  Reason for Admission:  Lactic Acidosis, Resp Distress , Hypoxia  Discharge Date: 22 RARS: Readmission Risk Score: 11.2    3rd attempt to contact patient for care transition follow up. Unable to reach patient. Left message with contact information and request for call back. Episode to be resolved and CTN to sign off if return call is not received from the patient.       Fozia Vilchis RN

## 2022-12-23 NOTE — CARE COORDINATION
[facility] Care Transitions Follow Up Call    Care Transition Nurse contacted the patient by telephone to follow up after admission on 22. Verified name and  with patient as identifiers. Patient: Charli Soto  Patient : 1945   MRN: <[id number]>  Reason for Admission: Lactic Acidosis, Resp Distress , Hypoxia  Discharge Date: 22 RARS: Readmission Risk Score: 11.2      Needs to be reviewed by the provider   Additional needs identified to be addressed with provider: No  none             Method of communication with provider: none. Received incoming call from patient. Left message stating that he is returning the call. He can be reached at [phone]. CTN called patient back for care transition follow up. Kate Hart states that he is doing fine. States he is out and about. Strength has improved and not as weak as he was. Reports breathing has been \"fine\". Usually becomes short of breath when overexerting himself. He is taking rest periods as needed and only does little at a time so he does not overexert himself. He is aware of his limits. Denies having any c/o chest pain/discomfort, pressure, tightness, dizziness/lightheadedness. Reports swelling in BLE have improved. States his kids went out and bought him a recliner which has been helping him with the swelling in his legs. He is still not weighing himself. States he does not think he is gaining weight or retaining any fluid. We discussed reason and importance of monitoring his weight daily and when to contact provider with a weight gain of 3 lbs within 24 hours or 5 lbs within a week. He verbalized understanding. He is still unsure of compliant he will be with weighing himself. Appetite has been fairly good. States he eats frozen dinners. Discussed diet and monitoring sodium intake. Discussed benefits of speaking with dietician. Kate Hart declines referral to RD.   Discussed when to contact providers with any new or worsening symptoms. He verbalized understanding. No questions or needs at this time. Addressed changes since last contact:  none    Follow Up  No future appointments. Non-[facility] follow up appointment(s): Has another appt with PCP the first week of January    Care Transition Nurse reviewed red flags with patient and discussed any barriers to care and/or understanding of plan of care after discharge. Discussed appropriate site of care based on symptoms and resources available to patient including: PCP. The patient agrees to contact the PCP office for questions related to their healthcare. Patients top risk factors for readmission: lack of knowledge about disease and medical condition-Lactic acidosis, COPD, CHF  Interventions to address risk factors: Education of patient/family/caregiver/guardian to support self-management-when to contact providers       Care Transitions Subsequent and Final Call    Subsequent and Final Calls  Do you have any ongoing symptoms?: Yes  Patient-reported symptoms: Shortness of Breath, Other  Have your medications changed?: No  Do you have any questions related to your medications?: No  Do you currently have any active services?: No  Do you have any needs or concerns that I can assist you with?: No  Care Transitions Interventions     Transportation Support: Declined      DME Assistance: Declined     Smoking Cessation: Completed      Social Work: Completed    Disease Association: Completed      Other Interventions:             Care Transition Nurse provided contact information for future needs. Plan for follow-up call in 7-10 days based on severity of symptoms and risk factors.   Plan for next call: symptom management-Edema, weighing self?, increased SOB, any new or worsening symptoms    Kim Aiken RN

## 2022-12-27 ENCOUNTER — PROCEDURE VISIT (OUTPATIENT)
Dept: CARDIOLOGY CLINIC | Age: 77
End: 2022-12-27
Payer: MEDICARE

## 2022-12-27 DIAGNOSIS — R55 SYNCOPE, UNSPECIFIED SYNCOPE TYPE: Primary | ICD-10-CM

## 2022-12-27 PROCEDURE — 93298 REM INTERROG DEV EVAL SCRMS: CPT | Performed by: INTERNAL MEDICINE

## 2022-12-27 PROCEDURE — G2066 INTER DEVC REMOTE 30D: HCPCS | Performed by: INTERNAL MEDICINE

## 2022-12-27 NOTE — PROGRESS NOTES
Carelink Med[employer] Linq   Patient of [facility]    History of syncope    Battery okay    Episodes:  12/15/22- 4 second pause - Justyn already aware. Continuing to monitor.

## 2022-12-29 ENCOUNTER — CARE COORDINATION (OUTPATIENT)
Dept: CASE MANAGEMENT | Age: 77
End: 2022-12-29

## 2022-12-29 NOTE — CARE COORDINATION
[facility] Care Transitions Follow Up Call    Care Transition Nurse attempted subsequent CT outreach leaving Sanjuana SERNA w/ my contact info. Briefly spoke w/ pts dtr/Cynthia who states she does not often see her father. Shares he refused to see them for [place] Holiday and often backs out of appts and commitments. Pt has [employer] and family checks in on him by phone often. States he buys beer by the cases and is a heavy drinker. States he sits and worries often but does not allow others to help him much. Saw Dr Attila Pathak  and noted pt had a 4 sec pause. Dtr is unaware of any syncopal concerns. Local CTN to outreach next wk. Patient: Lalo White  Patient : 1945   MRN: <[id number]>  Reason for Admission: 2022 - 2022 [address] COPD exacerbation, Lactic acidosis suspect poor po intake, Hyponatremia, Alcohol abuse, CHF secondary to diastolic dysfunction, Hyperlipidemia, Hypothyroidism, Nicotine use, 8 mm cavitary lesion, Coronary artery disease with prior cardiac stents.   Discharge Date: 22 RARS: Readmission Risk Score: 11.2      Amado Cano RN

## 2023-01-30 ENCOUNTER — TELEPHONE (OUTPATIENT)
Dept: CARDIOLOGY CLINIC | Age: 78
End: 2023-01-30

## 2023-01-30 NOTE — TELEPHONE ENCOUNTER
Unscheduled Carelink [employer] Linq  Pt of Justyn    1/29/23 at 0525 - 3.32 seconds pause    Call to patient. He was either in bed or just getting up. Had no dizziness/lightheadedness. Aware to call office if he ever does.

## 2023-01-31 ENCOUNTER — PROCEDURE VISIT (OUTPATIENT)
Dept: CARDIOLOGY CLINIC | Age: 78
End: 2023-01-31
Payer: MEDICARE

## 2023-01-31 DIAGNOSIS — R55 SYNCOPE, UNSPECIFIED SYNCOPE TYPE: Primary | ICD-10-CM

## 2023-01-31 PROCEDURE — 93298 REM INTERROG DEV EVAL SCRMS: CPT | Performed by: INTERNAL MEDICINE

## 2023-01-31 PROCEDURE — G2066 INTER DEVC REMOTE 30D: HCPCS | Performed by: INTERNAL MEDICINE

## 2023-01-31 NOTE — PROGRESS NOTES
Carelink [employer]tr[employer] Linq   Patient of Elsi Garrison    History of syncope    Battery okay    Episodes:  1/29/23- 3.32 sec pause -- previously sent in phone encounter. Pt asymptomatic.

## 2023-02-22 ENCOUNTER — TELEPHONE (OUTPATIENT)
Dept: CARDIOLOGY CLINIC | Age: 78
End: 2023-02-22

## 2023-02-22 NOTE — TELEPHONE ENCOUNTER
Unscheduled Carelink [employer] Linq  Pt of Justyn    2/21/23 1827 - 3.2 seconds    Call to patient. Thinks he was probably getting dinner around at that time. No s/s of pause. No dizziness/lightheadedness.

## 2023-03-07 ENCOUNTER — PROCEDURE VISIT (OUTPATIENT)
Dept: CARDIOLOGY CLINIC | Age: 78
End: 2023-03-07

## 2023-03-07 DIAGNOSIS — R55 SYNCOPE, UNSPECIFIED SYNCOPE TYPE: Primary | ICD-10-CM

## 2023-04-03 ENCOUNTER — TELEPHONE (OUTPATIENT)
Dept: CARDIOLOGY CLINIC | Age: 78
End: 2023-04-03

## 2023-04-03 RX ORDER — ATORVASTATIN CALCIUM 80 MG/1
TABLET, FILM COATED ORAL
Qty: 90 TABLET | Refills: 0 | OUTPATIENT
Start: 2023-04-03

## 2023-04-03 NOTE — TELEPHONE ENCOUNTER
Unscheduled [facility] Medtronic Linq  Pt of Justyn    History of syncope    4/1/23 2139- 3.04 second pause    Call to patient. He was asleep at the time.

## 2023-05-09 ENCOUNTER — TELEPHONE (OUTPATIENT)
Dept: CARDIOLOGY CLINIC | Age: 78
End: 2023-05-09

## 2023-05-09 NOTE — TELEPHONE ENCOUNTER
Unscheduled Carelink [employer] Linq  Pt of Alejandra    Hx of syncope  Hx of pauses- pt has been symptomatic. 5/8/23 1429- 12 seconds of Tachy , slightly irregular. ?pacs. Please review. Call to patient. Unaware of fast rates. Likely was just sitting watching TV. Has had fluttering in the past but has just learned to ignore it.      5/9/23 2326- 3.32 second pause

## 2023-05-16 ENCOUNTER — PROCEDURE VISIT (OUTPATIENT)
Dept: CARDIOLOGY CLINIC | Age: 78
End: 2023-05-16
Payer: MEDICARE

## 2023-05-16 DIAGNOSIS — R55 SYNCOPE, UNSPECIFIED SYNCOPE TYPE: Primary | ICD-10-CM

## 2023-05-16 PROCEDURE — 93298 REM INTERROG DEV EVAL SCRMS: CPT | Performed by: INTERNAL MEDICINE

## 2023-05-16 PROCEDURE — G2066 INTER DEVC REMOTE 30D: HCPCS | Performed by: INTERNAL MEDICINE

## 2023-05-16 NOTE — PROGRESS NOTES
Dr Juhi Worley pt  Medtronic carelink linq remote   Battery ok  2 real pause episodes previously addressed   1 sinus tach episode

## 2023-06-19 ENCOUNTER — TELEPHONE (OUTPATIENT)
Dept: CARDIOLOGY CLINIC | Age: 78
End: 2023-06-19

## 2023-06-19 DIAGNOSIS — I45.5 SINUS PAUSE: Primary | ICD-10-CM

## 2023-06-19 DIAGNOSIS — R55 SYNCOPE, UNSPECIFIED SYNCOPE TYPE: ICD-10-CM

## 2023-06-19 NOTE — TELEPHONE ENCOUNTER
Carelink Medtr[employer] Linq   Pt of Dru    6/17/23 1624: 3 seconds pause    Call to patient. Stated he thinks he remembers he \"Just felt off\"     Seen Dr Gill Sutton in office 6/14/23. He would like to go forward with the pacemaker. Would like an afternoon slot if able. Order placed and given to [facility] for scheduling.

## 2023-06-20 ENCOUNTER — PROCEDURE VISIT (OUTPATIENT)
Dept: CARDIOLOGY CLINIC | Age: 78
End: 2023-06-20
Payer: MEDICARE

## 2023-06-20 DIAGNOSIS — R55 SYNCOPE, UNSPECIFIED SYNCOPE TYPE: Primary | ICD-10-CM

## 2023-06-20 PROCEDURE — G2066 INTER DEVC REMOTE 30D: HCPCS | Performed by: INTERNAL MEDICINE

## 2023-06-20 PROCEDURE — 93298 REM INTERROG DEV EVAL SCRMS: CPT | Performed by: INTERNAL MEDICINE

## 2023-06-20 NOTE — TELEPHONE ENCOUNTER
Procedure: LB pacemaker  Date: 06.23.2023  Arrival Time: 10am  Meds to Hold: Will hold Lasix day of. Instructions verbalized to the patient.   No instructions were mailed

## 2023-06-20 NOTE — PROGRESS NOTES
Carelink medtronic linq     Battery ok   Hx syncope   Previously noted pause 6/17/23 @ 16:24~Julisa has orders for pacer implant

## 2023-06-22 ENCOUNTER — PREP FOR PROCEDURE (OUTPATIENT)
Dept: CARDIOLOGY | Age: 78
End: 2023-06-22

## 2023-06-22 RX ORDER — SODIUM CHLORIDE 9 MG/ML
INJECTION, SOLUTION INTRAVENOUS CONTINUOUS
Status: CANCELLED | OUTPATIENT
Start: 2023-06-22

## 2023-06-22 RX ORDER — SODIUM CHLORIDE 0.9 % (FLUSH) 0.9 %
5-40 SYRINGE (ML) INJECTION PRN
Status: CANCELLED | OUTPATIENT
Start: 2023-06-22

## 2023-06-22 RX ORDER — SODIUM CHLORIDE 0.9 % (FLUSH) 0.9 %
5-40 SYRINGE (ML) INJECTION EVERY 12 HOURS SCHEDULED
Status: CANCELLED | OUTPATIENT
Start: 2023-06-22

## 2023-06-22 RX ORDER — SODIUM CHLORIDE 9 MG/ML
INJECTION, SOLUTION INTRAVENOUS PRN
Status: CANCELLED | OUTPATIENT
Start: 2023-06-22

## 2023-06-23 ENCOUNTER — HOSPITAL ENCOUNTER (OUTPATIENT)
Dept: INPATIENT UNIT | Age: 78
Discharge: HOME | End: 2023-06-23
Attending: INTERNAL MEDICINE | Admitting: INTERNAL MEDICINE
Payer: MEDICARE

## 2023-06-23 ENCOUNTER — APPOINTMENT (OUTPATIENT)
Dept: GENERAL RADIOLOGY | Age: 78
End: 2023-06-23
Attending: INTERNAL MEDICINE
Payer: MEDICARE

## 2023-06-23 VITALS
BODY MASS INDEX: 28.13 KG/M2 | HEIGHT: 71 IN | RESPIRATION RATE: 18 BRPM | WEIGHT: 200.9 LBS | HEART RATE: 80 BPM | OXYGEN SATURATION: 96 % | TEMPERATURE: 97.9 F | SYSTOLIC BLOOD PRESSURE: 130 MMHG | DIASTOLIC BLOOD PRESSURE: 80 MMHG

## 2023-06-23 LAB
ANION GAP SERPL CALC-SCNC: 16 MEQ/L (ref 8–16)
APTT PPP: 31.3 SECONDS (ref 22–38)
BUN SERPL-MCNC: 9 MG/DL (ref 7–22)
CALCIUM SERPL-MCNC: 8.9 MG/DL (ref 8.5–10.5)
CHLORIDE SERPL-SCNC: 96 MEQ/L (ref 98–111)
CO2 SERPL-SCNC: 25 MEQ/L (ref 23–33)
CREAT SERPL-MCNC: 0.7 MG/DL (ref 0.4–1.2)
DEPRECATED RDW RBC AUTO: 50.9 FL (ref 35–45)
ERYTHROCYTE [DISTWIDTH] IN BLOOD BY AUTOMATED COUNT: 13.2 % (ref 11.5–14.5)
GFR SERPL CREATININE-BSD FRML MDRD: > 60 ML/MIN/1.73M2
GLUCOSE SERPL-MCNC: 114 MG/DL (ref 70–108)
HCT VFR BLD AUTO: 49.2 % (ref 42–52)
HGB BLD-MCNC: 16.7 GM/DL (ref 14–18)
INR PPP: 1.01 (ref 0.85–1.13)
MCH RBC QN AUTO: 35 PG (ref 26–33)
MCHC RBC AUTO-ENTMCNC: 33.9 GM/DL (ref 32.2–35.5)
MCV RBC AUTO: 103.1 FL (ref 80–94)
PLATELET # BLD AUTO: 142 THOU/MM3 (ref 130–400)
PMV BLD AUTO: 10.6 FL (ref 9.4–12.4)
POTASSIUM SERPL-SCNC: 3.9 MEQ/L (ref 3.5–5.2)
RBC # BLD AUTO: 4.77 MILL/MM3 (ref 4.7–6.1)
SODIUM SERPL-SCNC: 137 MEQ/L (ref 135–145)
WBC # BLD AUTO: 6.9 THOU/MM3 (ref 4.8–10.8)

## 2023-06-23 PROCEDURE — 71046 X-RAY EXAM CHEST 2 VIEWS: CPT

## 2023-06-23 PROCEDURE — 85610 PROTHROMBIN TIME: CPT

## 2023-06-23 PROCEDURE — 85027 COMPLETE CBC AUTOMATED: CPT

## 2023-06-23 PROCEDURE — C1887 CATHETER, GUIDING: HCPCS

## 2023-06-23 PROCEDURE — 33286 RMVL SUBQ CAR RHYTHM MNTR: CPT

## 2023-06-23 PROCEDURE — 85730 THROMBOPLASTIN TIME PARTIAL: CPT

## 2023-06-23 PROCEDURE — 6360000002 HC RX W HCPCS: Performed by: NURSE PRACTITIONER

## 2023-06-23 PROCEDURE — C1894 INTRO/SHEATH, NON-LASER: HCPCS

## 2023-06-23 PROCEDURE — 33208 INSRT HEART PM ATRIAL & VENT: CPT

## 2023-06-23 PROCEDURE — 2580000003 HC RX 258: Performed by: NURSE PRACTITIONER

## 2023-06-23 PROCEDURE — 93005 ELECTROCARDIOGRAM TRACING: CPT | Performed by: INTERNAL MEDICINE

## 2023-06-23 PROCEDURE — 33208 INSRT HEART PM ATRIAL & VENT: CPT | Performed by: INTERNAL MEDICINE

## 2023-06-23 PROCEDURE — 80048 BASIC METABOLIC PNL TOTAL CA: CPT

## 2023-06-23 PROCEDURE — 2500000003 HC RX 250 WO HCPCS

## 2023-06-23 PROCEDURE — C1898 LEAD, PMKR, OTHER THAN TRANS: HCPCS

## 2023-06-23 PROCEDURE — 2709999900 HC NON-CHARGEABLE SUPPLY

## 2023-06-23 PROCEDURE — A4217 STERILE WATER/SALINE, 500 ML: HCPCS | Performed by: NURSE PRACTITIONER

## 2023-06-23 PROCEDURE — 6360000004 HC RX CONTRAST MEDICATION: Performed by: INTERNAL MEDICINE

## 2023-06-23 PROCEDURE — 6360000002 HC RX W HCPCS

## 2023-06-23 PROCEDURE — C1769 GUIDE WIRE: HCPCS

## 2023-06-23 PROCEDURE — 36415 COLL VENOUS BLD VENIPUNCTURE: CPT

## 2023-06-23 PROCEDURE — C1785 PMKR, DUAL, RATE-RESP: HCPCS

## 2023-06-23 RX ORDER — SODIUM CHLORIDE 0.9 % (FLUSH) 0.9 %
5-40 SYRINGE (ML) INJECTION EVERY 12 HOURS SCHEDULED
Status: DISCONTINUED | OUTPATIENT
Start: 2023-06-23 | End: 2023-06-23 | Stop reason: HOSPADM

## 2023-06-23 RX ORDER — SODIUM CHLORIDE 9 MG/ML
INJECTION, SOLUTION INTRAVENOUS PRN
Status: DISCONTINUED | OUTPATIENT
Start: 2023-06-23 | End: 2023-06-23 | Stop reason: HOSPADM

## 2023-06-23 RX ORDER — SODIUM CHLORIDE 9 MG/ML
INJECTION, SOLUTION INTRAVENOUS CONTINUOUS
Status: DISCONTINUED | OUTPATIENT
Start: 2023-06-23 | End: 2023-06-23 | Stop reason: HOSPADM

## 2023-06-23 RX ORDER — SODIUM CHLORIDE 0.9 % (FLUSH) 0.9 %
5-40 SYRINGE (ML) INJECTION PRN
Status: DISCONTINUED | OUTPATIENT
Start: 2023-06-23 | End: 2023-06-23 | Stop reason: HOSPADM

## 2023-06-23 RX ADMIN — VANCOMYCIN HYDROCHLORIDE 250 MG: 1 INJECTION, POWDER, LYOPHILIZED, FOR SOLUTION INTRAVENOUS at 15:20

## 2023-06-23 RX ADMIN — SODIUM CHLORIDE: 9 INJECTION, SOLUTION INTRAVENOUS at 10:22

## 2023-06-23 RX ADMIN — Medication 2000 MG: at 13:05

## 2023-06-23 RX ADMIN — IOPAMIDOL 5 ML: 755 INJECTION, SOLUTION INTRAVENOUS at 15:22

## 2023-06-23 NOTE — BRIEF OP NOTE
Brief Postoperative Note    Date:   6/23/2023  Patient name: Uche Spencer  YOB: 1945  Sex: male   MRN:   [id number]    PCP: Pallavi Motta MD     Procedure:Dual Chamber pacemaker implantation    Pre-Op Diagnosis: Sick sinus syndrome. Post-Op Diagnosis: Same    Surgeon: Shirley Dixon MD, MRCP, [facility], [address]    Assistant: Meena MARIE/    Anesthesia/sedation:  Local lidocaine/fentanyl and midazolam as needed. Estimated Blood Loss (mL): Minimal    Complications: None    Recommendations:  See Epic orders. Bed rest for 2 hours. Keep pocket site dry. No shower for 7 days ((sponge bath and tub bath OK). ICE packs locally. Monitor site for bleeding or swelling. Pressure dressing x 24 hours. Chest x-ray PA and lateral views. Follow up in device clinic in 1 week.        Electronically signed by Natasha Mcfarlane MD, Natali Kirkpatrick on 6/23/2023 at 4:00 PM

## 2023-06-23 NOTE — PROGRESS NOTES
1005  Pt admitted to  2E01 ambulatory for dual chamber pacemaker. Pt NPO. Patient accompanied by daughter. Vital signs obtained. Assessment and data collection initiated. Oriented to room. Policies and procedures for 2E explained   All questions answered with no further questions at this time. Fall prevention and safety precautions discussed with patient. 1342  Pt to cath lab per bed  1602  Pt ret'd to 2E01 post dual chamber pacemaker implant. Left upper chest with aquacel dressing - D/I. Ice pack applied over site - pt instructed on its use for comfort as needed and never apply directly on the skin. Pt instructed to refrain from lifting his left arm over the level of the shoulder. IV 0.9NS cont'd. Daughter @ bedside. Pacemaker booklet and temporary ID card given to daughter. Pt vd 100 ml per urinal.  1700  Diet/fluids taken et bertha well  1720  Pt vd 175 ml per urinal.  1816  Pt to radiology per w/c  6357  Pt ret'd from radiology and Dr Danika Storey notified to review chest films  1902  IV site discont'd. Telemetry discont'd. Discharge instructions reviewed with pt and daughter, they voice understanding r/t f/u appointment, how to care for procedure site and activity restrictions.   Discharged per w/c for transport home with daughter via central transport

## 2023-06-23 NOTE — H&P
[address] [facility] ()  [address] [facility] [address]  H&P and SEDATION/ANALGESIA     Patient demographics:  Date:   6/23/2023  Patient name: Carmelita Baugh  YOB: 1945  Sex: male   MRN:   [id number]    Reason for admission or planned procedure:  Dual Chamber pacemaker implantation    Code Status: Full Code    Consent:The risk and benefits of pacemaker implantation including pneumothorax, hemothorax, bleeding, vascular complications, infection, pericardial tamponade requiring emergency pericardiocentesis, MI, death, exposure to radiation, lead dislodgement requiring reposition, future operations for generator change or device revision or upgrade were discussed with the patient. He verbalized understanding of the discussion. His questions were answered. The patient wishes to proceed. Brief clinical summary:  70? M with symptomatic sick sinus syndrome and intermittent AV block with asystole lectively presents for a pacemaker implantation. Medical hx: An episode of sinus bradycardia during hospitalization, prolonged CO interval, bifascicular block (RBBB, LAFB), Intermittent AV block with asystole (longest 3.2 seconds), nocturnal sinus pauses (3.2 seconds), low burden atrial flutter (ILR), CAD/PCI-LAD (2018), HFpEF (LVEF 55-60%), HTN, chronic smoking, excessive alcohol use, and hypothyroidism. Past Medical History:  Past Medical History:   Diagnosis Date    Angina at rest [facility]) 1/17/2018    Hypertension     Precordial pain 1/17/2018    S/P cardiac cath: 1/25/2018: 70-75% mid-LAD. LCX OK. RCA 20%. LVEF 454-50%. LVEDP 12 mmHg. 1/25/2018 1/25/2018: 70-75% mid-LAD. LCX OK. RCA 20%. LVEF 454-50%. LVEDP 12 mmHg. Dr. Malu Benjamin    S/P PTCA: 1/25/2018: FFR-guided stenting of mid-LAD Xience 2.75x28 mm, post-dilated to 3.78 mm proximally. 1/25/2018 1/25/2018: FFR-guided stenting of mid-LAD Xience 2.75x28 mm, post-dilated to 3.78 mm proximally.  Dr. Malu Benjamin

## 2023-06-23 NOTE — PROCEDURES
[address] [facility]  Dept: [phone]  ELECTROPHYSIOLOGY PROCEDURE NOTE  Patients Demographics:  Date:   6/23/2023  Patient name:              Codie Isaacs  YOB: 1945  Sex: male   MRN:   [id number]    Primary Care Provider:    Alyce Mehta MD     Procedure Planned:  Dual chamber pacemaker implantation. Cardiologist:  Karl Gomez MD     Indications for Procedure  Symptomatic sick sinus syndrome and intermittent high grade Av block,. Brief Medical History:  70? M with symptomatic sick sinus syndrome and intermittent AV block with asystole lectively presents for a pacemaker implantation. Medical hx: An episode of sinus bradycardia during hospitalization, prolonged GA interval, bifascicular block (RBBB, LAFB), Intermittent AV block with asystole (longest 3.2 seconds), nocturnal sinus pauses (3.2 seconds), low burden atrial flutter (ILR), CAD/PCI-LAD (2018), HFpEF (LVEF 55-60%), HTN, chronic smoking, excessive alcohol use, and hypothyroidism. Procedure Performed:  Dual chamber pacemaker implantation (left bundle area pacing). Ultrasound guided left axillary vein accesses. Fluoroscopy of the leads. Procedure Details: The patient was brought to the electrophysiology lab in a fasting and non-sedated state. He was prepped and draped in the usual sterile fashion. Fentanyl and midazolam was used for for conscious sedation and analgesia respectively. The left pectoral region was liberally infiltrated with 2% lidocaine for local anesthesia. Left axillary vein was cannulated x2 under ultrasound guidance using 21G micro-puncture needle and 2 standard J-tip 0.035 inch guidewires were advanced into IVC. A 4 cm long incision was made in the left pectoral region using #10 blade and a subcutaneous pocket was made.  Two 7-Vietnamese hemostatic peel away sheaths were advanced into the axillary vein over the guide

## 2023-06-23 NOTE — DISCHARGE INSTRUCTIONS
PACEMAKER; PATIENT INSTRUCTIONS AFTER IMPLANTATION    ACTIVITY    1. You should gradually return to your normal activities  2. For the first 4 weeks:               a.  Do not lift more than 10 pounds               b.  Do not swim, golf, bowl, or vacuum. c.  Do not raise your device side arm above your shoulder               d.  At your 2 week follow up visit, ask your doctor which of the above activities you can                    resume    e. ALWAYS avoid any contact sports or hard blows to the chest or abdomen. f.  Avoid sleeping on the left side and face down. 3.  Patients with New Pacemakers can drive 24 hours after implant  4. You may resume your sexual activity when you feel ready  5. Call the office at [phone] if you have any of the following   - increased swelling    - increased redness   - increased pain   - fever, chills   - Drainage from the site   - IF  [facility] [address] UP\". ESPECIALLY IF YOU CAN SEE THE DEVICE THROUGH THE INCISION, REPORT TO EMERGENCY ROOM IMMEDIATELY    WOUND CARE    Leave aquacel dressing on for 7 days. If there are any remove Steri strips below dressing do not remove them  You may shower tomorrow. Do not apply any ointment, talc, or lotion to the incision. Do not scratch, rub or touch the incision with your hands  Wash  your hands with soap and water for at least 15 seconds before and after touching your incision or dressing. If there are any Steri-Strips (tape strips) they will be removed at your follow-up visit, if they have not yet peeled off on their own. Call your doctor immediately if your incision looks red, becomes swollen or tender, or begins to ooze fluid.   Also notify your doctor at once it you develop a temperature greater than 100 degrees Fahrenheit      MAGNETS AND ELECTROMAGNETIC INTERFERENCE    Some devices or equipment that we encounter send out electrical signals that can: interfere with the operation of your pacemaker

## 2023-06-24 LAB
EKG ATRIAL RATE: 72 BPM
EKG ATRIAL RATE: 72 BPM
EKG ATRIAL RATE: 76 BPM
EKG P AXIS: 14 DEGREES
EKG P AXIS: 53 DEGREES
EKG P AXIS: 60 DEGREES
EKG P-R INTERVAL: 186 MS
EKG P-R INTERVAL: 212 MS
EKG P-R INTERVAL: 224 MS
EKG Q-T INTERVAL: 370 MS
EKG Q-T INTERVAL: 420 MS
EKG Q-T INTERVAL: 424 MS
EKG QRS DURATION: 106 MS
EKG QRS DURATION: 128 MS
EKG QRS DURATION: 130 MS
EKG QTC CALCULATION (BAZETT): 405 MS
EKG QTC CALCULATION (BAZETT): 464 MS
EKG QTC CALCULATION (BAZETT): 472 MS
EKG R AXIS: -89 DEGREES
EKG R AXIS: 103 DEGREES
EKG R AXIS: 106 DEGREES
EKG T AXIS: -65 DEGREES
EKG T AXIS: -68 DEGREES
EKG T AXIS: 36 DEGREES
EKG VENTRICULAR RATE: 72 BPM
EKG VENTRICULAR RATE: 72 BPM
EKG VENTRICULAR RATE: 76 BPM

## 2023-06-24 PROCEDURE — 93010 ELECTROCARDIOGRAM REPORT: CPT | Performed by: INTERNAL MEDICINE

## 2023-07-03 ENCOUNTER — NURSE ONLY (OUTPATIENT)
Dept: CARDIOLOGY CLINIC | Age: 78
End: 2023-07-03
Payer: MEDICARE

## 2023-07-03 ENCOUNTER — TELEPHONE (OUTPATIENT)
Dept: CARDIOLOGY CLINIC | Age: 78
End: 2023-07-03

## 2023-07-03 DIAGNOSIS — Z95.0 PACEMAKER: Primary | ICD-10-CM

## 2023-07-03 PROCEDURE — 93296 REM INTERROG EVL PM/IDS: CPT | Performed by: INTERNAL MEDICINE

## 2023-07-03 NOTE — PROGRESS NOTES
Dr Luana Dunne pt     Medtronic dual pacer initial check in office / pt is going to call [facility] about a new monitor         Pt has a 3830 lead in the left bundle area    Dr Mayte Atkins in to see pt   Pt programmed from ddd to mvp  to allow for pts own underlying conduction to come thru. Pts rt arm and hand is swollen and cool to the touch. Dr Mayte Atkins gave pt verbal order to use a stress ball to help with the swelling. Pt verbalizes understanding, he knows he is to call if swelling to rt hand,arm does not resolve. PT DOES NOT HAVE ANY F/U WITH DR CARIAS. DID YOU WANT HIM TO BE SEEN BY SOMEONE IN THIS PRACTICE. PT TOLD ME THAT SINCE HE HAD A LOOP RECORDER, HE HAS JUST BEEN SEEING DR Mayte Atkins AND NOT DR CARIAS?????????    Presents in asvp  A paced 2.9%  V paced 99.6%    Underlying asvs     Ddd 60-13    Battery 11.3 yrs remaining    P waves 4.1  Rv waves 15.4    Atrial impedence 513  Vent impedence 532    Atrial threshold 0.75 @ 0.4  Vent threshold 0.5 @ 0.4    Atrial and vent amplitudes 3.5 @ 0.4    Incision line with large pressure dressing removed from lt upper chest and to lowe lt chest area where medtronic linq recorder was explanted      Incision lines are both well approx without any redness drainage or warm to the touch.  Reviewed all post op instructions with pt and daughter and handouts given     R

## 2023-07-12 NOTE — TELEPHONE ENCOUNTER
Spoke with patient; no openings with Domitila PHELAN until late August--appt scheduled with Gerard Reina pt stated he is okay to see midlevel provider

## 2023-07-27 ENCOUNTER — OFFICE VISIT (OUTPATIENT)
Dept: CARDIOLOGY CLINIC | Age: 78
End: 2023-07-27
Payer: MEDICARE

## 2023-07-27 VITALS
HEIGHT: 71 IN | WEIGHT: 210 LBS | BODY MASS INDEX: 29.4 KG/M2 | DIASTOLIC BLOOD PRESSURE: 94 MMHG | HEART RATE: 99 BPM | SYSTOLIC BLOOD PRESSURE: 147 MMHG

## 2023-07-27 DIAGNOSIS — I10 ESSENTIAL HYPERTENSION: ICD-10-CM

## 2023-07-27 DIAGNOSIS — Z95.0 ARTIFICIAL CARDIAC PACEMAKER: ICD-10-CM

## 2023-07-27 DIAGNOSIS — R00.1 BRADYCARDIA: Primary | ICD-10-CM

## 2023-07-27 PROCEDURE — 3080F DIAST BP >= 90 MM HG: CPT | Performed by: STUDENT IN AN ORGANIZED HEALTH CARE EDUCATION/TRAINING PROGRAM

## 2023-07-27 PROCEDURE — 1123F ACP DISCUSS/DSCN MKR DOCD: CPT | Performed by: STUDENT IN AN ORGANIZED HEALTH CARE EDUCATION/TRAINING PROGRAM

## 2023-07-27 PROCEDURE — 99214 OFFICE O/P EST MOD 30 MIN: CPT | Performed by: STUDENT IN AN ORGANIZED HEALTH CARE EDUCATION/TRAINING PROGRAM

## 2023-07-27 PROCEDURE — 3077F SYST BP >= 140 MM HG: CPT | Performed by: STUDENT IN AN ORGANIZED HEALTH CARE EDUCATION/TRAINING PROGRAM

## 2023-07-27 RX ORDER — ATORVASTATIN CALCIUM 80 MG/1
TABLET, FILM COATED ORAL
Qty: 90 TABLET | Refills: 1 | Status: CANCELLED | OUTPATIENT
Start: 2023-07-27

## 2023-07-27 RX ORDER — AMLODIPINE BESYLATE 5 MG/1
TABLET ORAL
Qty: 90 TABLET | Refills: 1 | Status: CANCELLED | OUTPATIENT
Start: 2023-07-27

## 2023-07-27 NOTE — PROGRESS NOTES
Pt reports doing okay overall and is trying to become more independent, but is still having issues with fatigue and SOB

## 2023-07-27 NOTE — PROGRESS NOTES
[facility] PROFESSIONAL SERVICES  [facility] [address] [address]   Dept: [phone]   Dept Fax: [phone]   Loc: [phone]      Chief Complaint   Patient presents with    Follow-up     Cardiologist:  Dr. Alejandra/Dru   71 yo male presents for f/u of pacer implant. Had loop removed. Sinus bradycardia, intermittent pauses, bifascicular block and prolonged AV conduction. PCI 2018, preserved EF, COPD, chronic smoker, alcohol abuse. Having fatigued. No chest pain. Some sob, worse in the morning. Gets better throughout the day after he gets up and around. Has had sleep apnea diagnosed, no treatment. His symptosm are not severe. Has not had stress/cath in 5 years, echo 3 years. General:   No fever, no chills, no weight loss, ++ fatigue  Pulmonary:    No dyspnea, no wheezing  Cardiac:    Denies recent chest pain   GI:     No nausea or vomiting, no abdominal pain  Neuro:      No dizziness or light headedness  Musculoskeletal:  No recent active issues  Extremities:   No edema      Past Medical History:   Diagnosis Date    Angina at rest [facility]) 1/17/2018    Hypertension     Precordial pain 1/17/2018    S/P cardiac cath: 1/25/2018: 70-75% mid-LAD. LCX OK. RCA 20%. LVEF 454-50%. LVEDP 12 mmHg. 1/25/2018 1/25/2018: 70-75% mid-LAD. LCX OK. RCA 20%. LVEF 454-50%. LVEDP 12 mmHg. Dr. Cheryl Tripathi    S/P PTCA: 1/25/2018: FFR-guided stenting of mid-LAD Xience 2.75x28 mm, post-dilated to 3.78 mm proximally. 1/25/2018 1/25/2018: FFR-guided stenting of mid-LAD Xience 2.75x28 mm, post-dilated to 3.78 mm proximally.  Dr. Cheryl Tripathi    Thyroid disease        No Known Allergies    Current Outpatient Medications   Medication Sig Dispense Refill    furosemide (LASIX) 20 MG tablet Take 1 tablet by mouth three times a week 90 tablet 0    albuterol sulfate HFA (VENTOLIN HFA) 108 (90 Base) MCG/ACT inhaler Inhale 2 puffs into the lungs 4 times daily as needed for Wheezing 54 g 0

## 2023-10-09 ENCOUNTER — TELEPHONE (OUTPATIENT)
Dept: CARDIOLOGY CLINIC | Age: 78
End: 2023-10-09

## 2023-10-09 DIAGNOSIS — R06.02 SOB (SHORTNESS OF BREATH): ICD-10-CM

## 2023-10-09 DIAGNOSIS — I10 ESSENTIAL HYPERTENSION: ICD-10-CM

## 2023-10-09 DIAGNOSIS — R55 SYNCOPE, UNSPECIFIED SYNCOPE TYPE: ICD-10-CM

## 2023-10-09 DIAGNOSIS — R00.1 BRADYCARDIA: Primary | ICD-10-CM

## 2023-10-09 DIAGNOSIS — Z95.0 PACEMAKER: ICD-10-CM

## 2023-10-09 NOTE — TELEPHONE ENCOUNTER
OV with Zahra Monte 7/27/23:        Pt called office and would like to move forward with stress/echo. Okay to schedule?

## 2023-10-09 NOTE — TELEPHONE ENCOUNTER
Call to pt, stress test and echo scheduled 10-19-23  Date, time and instructions reviewed and mailed to pt

## 2023-10-19 ENCOUNTER — HOSPITAL ENCOUNTER (OUTPATIENT)
Dept: NON INVASIVE DIAGNOSTICS | Age: 78
Discharge: HOME | End: 2023-10-19
Payer: MEDICARE

## 2023-10-19 VITALS — HEIGHT: 71 IN | BODY MASS INDEX: 30.52 KG/M2 | WEIGHT: 218 LBS

## 2023-10-19 DIAGNOSIS — R00.1 BRADYCARDIA: ICD-10-CM

## 2023-10-19 DIAGNOSIS — R06.02 SOB (SHORTNESS OF BREATH): ICD-10-CM

## 2023-10-19 DIAGNOSIS — I10 ESSENTIAL HYPERTENSION: ICD-10-CM

## 2023-10-19 DIAGNOSIS — R55 SYNCOPE, UNSPECIFIED SYNCOPE TYPE: ICD-10-CM

## 2023-10-19 DIAGNOSIS — Z95.0 PACEMAKER: ICD-10-CM

## 2023-10-19 PROCEDURE — 3430000000 HC RX DIAGNOSTIC RADIOPHARMACEUTICAL: Performed by: STUDENT IN AN ORGANIZED HEALTH CARE EDUCATION/TRAINING PROGRAM

## 2023-10-19 PROCEDURE — 93017 CV STRESS TEST TRACING ONLY: CPT | Performed by: INTERNAL MEDICINE

## 2023-10-19 PROCEDURE — 6360000002 HC RX W HCPCS

## 2023-10-19 PROCEDURE — 93306 TTE W/DOPPLER COMPLETE: CPT

## 2023-10-19 PROCEDURE — A9500 TC99M SESTAMIBI: HCPCS | Performed by: STUDENT IN AN ORGANIZED HEALTH CARE EDUCATION/TRAINING PROGRAM

## 2023-10-19 PROCEDURE — 78452 HT MUSCLE IMAGE SPECT MULT: CPT | Performed by: INTERNAL MEDICINE

## 2023-10-19 RX ORDER — TETRAKIS(2-METHOXYISOBUTYLISOCYANIDE)COPPER(I) TETRAFLUOROBORATE 1 MG/ML
33 INJECTION, POWDER, LYOPHILIZED, FOR SOLUTION INTRAVENOUS
Status: COMPLETED | OUTPATIENT
Start: 2023-10-19 | End: 2023-10-19

## 2023-10-19 RX ORDER — TETRAKIS(2-METHOXYISOBUTYLISOCYANIDE)COPPER(I) TETRAFLUOROBORATE 1 MG/ML
8.7 INJECTION, POWDER, LYOPHILIZED, FOR SOLUTION INTRAVENOUS
Status: COMPLETED | OUTPATIENT
Start: 2023-10-19 | End: 2023-10-19

## 2023-10-19 RX ADMIN — Medication 8.7 MILLICURIE: at 13:00

## 2023-10-19 RX ADMIN — Medication 33 MILLICURIE: at 13:51

## 2023-10-20 DIAGNOSIS — R06.02 SOB (SHORTNESS OF BREATH): Primary | ICD-10-CM

## 2023-10-20 DIAGNOSIS — I10 ESSENTIAL HYPERTENSION: ICD-10-CM

## 2023-10-20 DIAGNOSIS — R00.1 BRADYCARDIA: ICD-10-CM

## 2023-10-20 DIAGNOSIS — R94.39 ABNORMAL STRESS TEST: ICD-10-CM

## 2023-10-20 DIAGNOSIS — I20.89 ANGINA AT REST: ICD-10-CM

## 2023-10-23 NOTE — TELEPHONE ENCOUNTER
Brandi Viera MD  to Catrina Dwyer PA-C  [facility] Clinical Staff        10/23/23 11:52 AM  Shanthi Torres ordered   If on GDMT and sandra feels appropriate - proceed cath     Shanthi Torres cath? ?

## 2023-10-24 ENCOUNTER — TELEPHONE (OUTPATIENT)
Dept: CARDIOLOGY CLINIC | Age: 78
End: 2023-10-24

## 2023-10-24 ENCOUNTER — NURSE ONLY (OUTPATIENT)
Dept: CARDIOLOGY CLINIC | Age: 78
End: 2023-10-24
Payer: MEDICARE

## 2023-10-24 DIAGNOSIS — Z95.0 PACEMAKER: Primary | ICD-10-CM

## 2023-10-24 DIAGNOSIS — R00.1 BRADYCARDIA: Primary | ICD-10-CM

## 2023-10-24 DIAGNOSIS — R06.02 SOB (SHORTNESS OF BREATH): ICD-10-CM

## 2023-10-24 DIAGNOSIS — I20.89 ANGINA AT REST: ICD-10-CM

## 2023-10-24 DIAGNOSIS — R94.39 ABNORMAL STRESS TEST: ICD-10-CM

## 2023-10-24 PROCEDURE — 93280 PM DEVICE PROGR EVAL DUAL: CPT | Performed by: INTERNAL MEDICINE

## 2023-10-24 RX ORDER — METOPROLOL SUCCINATE 50 MG/1
50 TABLET, EXTENDED RELEASE ORAL DAILY
Qty: 30 TABLET | Refills: 3 | Status: SHIPPED | OUTPATIENT
Start: 2023-10-24

## 2023-10-24 NOTE — TELEPHONE ENCOUNTER
Spoke with patient. His symptoms have not worsened but they have not gotten ant better. Please advise.

## 2023-10-24 NOTE — PROGRESS NOTES
In Office Medtronic Dual Pacemaker   Patient of Justyn    Implanted 6/23/23  3m follow up    Battery 13.1 years    Presenting rhythm ASVS    A Impedance 475  RV Impedance 513    P wave sensing 2.9  R wave sensing 17.4    A Threshold 0.625 @ 0.40  A Amplitude 3 @ 0.40 - decreased to 2  RV Thresholds 0.625 @ 0.40  RV Amplitude 3.5 @ 0.40- decreased to 2.5      A Paced 1.9%  V Paced 24.8%    Programmed Mode AAI <=> DDD       Afib [place] <0.1%    Episodes   8/30/23-11 beats VT  9/9/23-8 beats VT  9/17/23-11 beats VT  10/4/23-6 beats

## 2023-10-24 NOTE — TELEPHONE ENCOUNTER
Call to pt  Heart cath scheduled 12-04-23 with dr Deshpande Comp  Date, time and instructions reviewed and mailed to pt

## 2023-10-24 NOTE — TELEPHONE ENCOUNTER
Za Mills PA-C  to [facility] Clinical Staff        10/24/23 10:59 AM  Note      Okay.  Start toprol 50 daily and schedule [facility]

## 2023-10-24 NOTE — TELEPHONE ENCOUNTER
Lets start him on toprol for 4-6 weeks and see how he's feeling. If his symptoms are getting worse, fine for cath. He was not on bb due to prior AV block but has pace rnow.

## 2023-10-25 ENCOUNTER — HOSPITAL ENCOUNTER (EMERGENCY)
Age: 78
Discharge: HOME | End: 2023-10-25
Attending: EMERGENCY MEDICINE
Payer: MEDICARE

## 2023-10-25 ENCOUNTER — TELEPHONE (OUTPATIENT)
Dept: CARDIOLOGY CLINIC | Age: 78
End: 2023-10-25

## 2023-10-25 ENCOUNTER — HOSPITAL ENCOUNTER (EMERGENCY)
Age: 78
Discharge: HOME | End: 2023-10-25
Attending: STUDENT IN AN ORGANIZED HEALTH CARE EDUCATION/TRAINING PROGRAM
Payer: MEDICARE

## 2023-10-25 VITALS
TEMPERATURE: 97.6 F | SYSTOLIC BLOOD PRESSURE: 140 MMHG | DIASTOLIC BLOOD PRESSURE: 80 MMHG | HEART RATE: 70 BPM | BODY MASS INDEX: 29.98 KG/M2 | RESPIRATION RATE: 16 BRPM | OXYGEN SATURATION: 98 % | WEIGHT: 214.95 LBS

## 2023-10-25 VITALS
DIASTOLIC BLOOD PRESSURE: 100 MMHG | SYSTOLIC BLOOD PRESSURE: 162 MMHG | WEIGHT: 215 LBS | BODY MASS INDEX: 29.99 KG/M2 | OXYGEN SATURATION: 97 % | RESPIRATION RATE: 18 BRPM | TEMPERATURE: 97.6 F | HEART RATE: 76 BPM

## 2023-10-25 DIAGNOSIS — I47.20 VT (VENTRICULAR TACHYCARDIA) (HCC): Primary | ICD-10-CM

## 2023-10-25 DIAGNOSIS — R04.0 EPISTAXIS: Primary | ICD-10-CM

## 2023-10-25 LAB
ANION GAP SERPL CALC-SCNC: 10 MEQ/L (ref 8–16)
APTT PPP: 31.2 SECONDS (ref 22–38)
AUTO DIFF PNL BLD: ABNORMAL
BASOPHILS ABSOLUTE: 0 THOU/MM3 (ref 0–0.1)
BASOPHILS NFR BLD AUTO: 0.4 %
BUN SERPL-MCNC: 12 MG/DL (ref 7–22)
CALCIUM SERPL-MCNC: 8.8 MG/DL (ref 8.5–10.5)
CHLORIDE SERPL-SCNC: 95 MEQ/L (ref 98–111)
CO2 SERPL-SCNC: 26 MEQ/L (ref 23–33)
CREAT SERPL-MCNC: 0.7 MG/DL (ref 0.4–1.2)
DEPRECATED RDW RBC AUTO: 50.2 FL (ref 35–45)
EOSINOPHIL NFR BLD AUTO: 2 %
EOSINOPHILS ABSOLUTE: 0.2 THOU/MM3 (ref 0–0.4)
ERYTHROCYTE [DISTWIDTH] IN BLOOD BY AUTOMATED COUNT: 13.5 % (ref 11.5–14.5)
GFR SERPL CREATININE-BSD FRML MDRD: > 60 ML/MIN/1.73M2
GLUCOSE SERPL-MCNC: 110 MG/DL (ref 70–108)
HCT VFR BLD AUTO: 53.5 % (ref 42–52)
HGB BLD-MCNC: 18.2 GM/DL (ref 14–18)
IMM GRANULOCYTES # BLD AUTO: 0.03 THOU/MM3 (ref 0–0.07)
IMM GRANULOCYTES NFR BLD AUTO: 0.4 %
INR PPP: 1 (ref 0.85–1.13)
LYMPHOCYTES ABSOLUTE: 1.4 THOU/MM3 (ref 1–4.8)
LYMPHOCYTES NFR BLD AUTO: 18.4 %
MCH RBC QN AUTO: 34 PG (ref 26–33)
MCHC RBC AUTO-ENTMCNC: 34 GM/DL (ref 32.2–35.5)
MCV RBC AUTO: 100 FL (ref 80–94)
MONOCYTES ABSOLUTE: 0.7 THOU/MM3 (ref 0.4–1.3)
MONOCYTES NFR BLD AUTO: 8.7 %
NEUTROPHILS NFR BLD AUTO: 70.1 %
NRBC BLD AUTO-RTO: 0 /100 WBC
NT-PROBNP SERPL IA-MCNC: 768.5 PG/ML (ref 0–449)
OSMOLALITY SERPL CALC.SUM OF ELEC: 263.1 MOSMOL/KG (ref 275–300)
PATHOLOGIST REVIEW: ABNORMAL
PLATELET # BLD AUTO: 149 THOU/MM3 (ref 130–400)
PLATELET BLD QL SMEAR: ADEQUATE
PMV BLD AUTO: 10 FL (ref 9.4–12.4)
POTASSIUM SERPL-SCNC: 4.1 MEQ/L (ref 3.5–5.2)
RBC # BLD AUTO: 5.35 MILL/MM3 (ref 4.7–6.1)
SCAN OF BLOOD SMEAR: NORMAL
SEGMENTED NEUTROPHILS ABSOLUTE COUNT: 5.4 THOU/MM3 (ref 1.8–7.7)
SODIUM SERPL-SCNC: 131 MEQ/L (ref 135–145)
TROPONIN, HIGH SENSITIVITY: 23 NG/L (ref 0–12)
TROPONIN, HIGH SENSITIVITY: 25 NG/L (ref 0–12)
WBC # BLD AUTO: 7.7 THOU/MM3 (ref 4.8–10.8)

## 2023-10-25 PROCEDURE — 85025 COMPLETE CBC W/AUTO DIFF WBC: CPT

## 2023-10-25 PROCEDURE — 93010 ELECTROCARDIOGRAM REPORT: CPT | Performed by: INTERNAL MEDICINE

## 2023-10-25 PROCEDURE — 93005 ELECTROCARDIOGRAM TRACING: CPT | Performed by: EMERGENCY MEDICINE

## 2023-10-25 PROCEDURE — 2500000003 HC RX 250 WO HCPCS: Performed by: STUDENT IN AN ORGANIZED HEALTH CARE EDUCATION/TRAINING PROGRAM

## 2023-10-25 PROCEDURE — 80048 BASIC METABOLIC PNL TOTAL CA: CPT

## 2023-10-25 PROCEDURE — 99284 EMERGENCY DEPT VISIT MOD MDM: CPT

## 2023-10-25 PROCEDURE — 84484 ASSAY OF TROPONIN QUANT: CPT

## 2023-10-25 PROCEDURE — 30901 CONTROL OF NOSEBLEED: CPT

## 2023-10-25 PROCEDURE — 36415 COLL VENOUS BLD VENIPUNCTURE: CPT

## 2023-10-25 PROCEDURE — 85610 PROTHROMBIN TIME: CPT

## 2023-10-25 PROCEDURE — 85730 THROMBOPLASTIN TIME PARTIAL: CPT

## 2023-10-25 PROCEDURE — 99282 EMERGENCY DEPT VISIT SF MDM: CPT

## 2023-10-25 PROCEDURE — 83880 ASSAY OF NATRIURETIC PEPTIDE: CPT

## 2023-10-25 RX ORDER — LIDOCAINE HYDROCHLORIDE 40 MG/ML
4 SOLUTION TOPICAL ONCE
Status: DISCONTINUED | OUTPATIENT
Start: 2023-10-25 | End: 2023-10-25 | Stop reason: HOSPADM

## 2023-10-25 RX ORDER — TRANEXAMIC ACID 100 MG/ML
1000 INJECTION, SOLUTION INTRAVENOUS ONCE
Status: COMPLETED | OUTPATIENT
Start: 2023-10-25 | End: 2023-10-25

## 2023-10-25 RX ADMIN — TRANEXAMIC ACID 1000 MG: 100 INJECTION, SOLUTION INTRAVENOUS at 11:36

## 2023-10-25 RX ADMIN — PHENYLEPHRINE HYDROCHLORIDE 1 SPRAY: 0.5 SPRAY NASAL at 11:36

## 2023-10-25 ASSESSMENT — PAIN - FUNCTIONAL ASSESSMENT
PAIN_FUNCTIONAL_ASSESSMENT: NONE - DENIES PAIN

## 2023-10-25 NOTE — TELEPHONE ENCOUNTER
Patient was in the ED today and they did blood work. Dr. Andree guzmán see patient's labs from today.

## 2023-10-25 NOTE — ED PROVIDER NOTES
MAKING / ED COURSE:     1) Number and Complexity of Problems            Problem List This Visit:         Chief Complaint   Patient presents with    Epistaxis            Differential Diagnosis includes (but not limited to): Anterior bleed, posterior bleed, endorgan dysfunction from hypertension         Pertinent Comorbid Conditions:    CAD, COPD, pacemaker    2)  Data Reviewed (none if left blank)          My Independent interpretations:     EKG:      Normal sinus rhythm with a rate of 79,  consistent with first-degree AV block, , QTc 447 without significant change when compared to previous    Labs:      Mild hyponatremia and hypochloremia, , troponin 23 with repeat of 25, no leukocytosis, hemoglobin 18.2                 Decision Rules/Clinical Scores utilized:              External Documentation Reviewed:         Previous patient encounter documents & history available on EMR was reviewed: Previous episode of epistaxis 2 years ago per patient required packing             See Formal Diagnostic Results above for the lab and radiology tests and orders. 3)  Treatment and Disposition         ED Reassessment: On reassessment, patient was observed more than 2 hours without any recurrence of bleeding         Case discussed with consulting clinician:           Shared Decision-Making was performed and disposition discussed with the        Patient/Family and questions answered: Patient is agreeable with plan for discharge home with bottle of Hal-Synephrine/Afrin and nose clamp. He was educated on how to stop nosebleeds at home and when to return to the emergency department.          Social determinants of health impacting treatment or disposition: Lives alone      Summary of Patient Presentation:      MDM  Number of Diagnoses or Management Options  Epistaxis: new, needed workup  Diagnosis management comments: Patient is a 46-year-old male with a history of CAD, COPD who presents with a 2 to 3-hour

## 2023-10-25 NOTE — ED NOTES
Pt presents to the ED with concerns of a nose bleed for a few hours. Patient is on blood thinners. He reports being out of some of his prescribed medications for 2-3 weeks due to not scheduling a follow up with his PCP for renewals. He is unsure of what medications he is out of. Denies any pain.       Valencia Brannon  10/25/23 1037

## 2023-10-25 NOTE — TELEPHONE ENCOUNTER
Call to patient. Toprol XL 50 mg just added yesterday by Minerva Wooten. Pt plans to go today and pick that up. Updated on labwork orders. Will go to [facility] and get them done.      Labwork faxed Lianne Gomes- heart cath scheduled 12/4/23

## 2023-10-25 NOTE — TELEPHONE ENCOUNTER
Progress Notes  Spenser Khan MD (Physician)   Interventional Cardiology  Increase metop to 75 mg XL PO q day  Check BMP/Mg          Korina Hoskins RN at 10/24/2023  2:50 PM    Status: Signed   In Office Medtronic Dual Pacemaker   Patient of Justyn     Implanted 6/23/23  3m follow up     Battery 13.1 years     Presenting rhythm ASVS     A Impedance 475  RV Impedance 513     P wave sensing 2.9  R wave sensing 17.4     A Threshold 0.625 @ 0.40  A Amplitude 3 @ 0.40 - decreased to 2  RV Thresholds 0.625 @ 0.40  RV Amplitude 3.5 @ 0.40- decreased to 2.5        A Paced 1.9%  V Paced 24.8%     Programmed Mode AAI <=> DDD         Afib [place] <0.1%     Episodes   8/30/23-11 beats VT  9/9/23-8 beats VT  9/17/23-11 beats VT  10/4/23-6 beats

## 2023-10-25 NOTE — DISCHARGE INSTRUCTIONS
If your nose starts bleeding again, blow the clots out into a Kleenex. Spray 2 sprays of Afrin into your nose and apply nose clamp for 20 to 30 minutes. Do not lift nose clamp during this time. After waiting period, see if nose is still bleeding. If it is, repeat the previous steps 1 more time. If nose is still bleeding, come to the emergency department. Follow-up with your primary doctor to get restarted on your home medications.

## 2023-10-26 ENCOUNTER — HOSPITAL ENCOUNTER (EMERGENCY)
Age: 78
Discharge: HOME | End: 2023-10-26
Attending: STUDENT IN AN ORGANIZED HEALTH CARE EDUCATION/TRAINING PROGRAM
Payer: MEDICARE

## 2023-10-26 ENCOUNTER — OFFICE VISIT (OUTPATIENT)
Dept: ENT CLINIC | Age: 78
End: 2023-10-26

## 2023-10-26 VITALS
BODY MASS INDEX: 28.31 KG/M2 | RESPIRATION RATE: 20 BRPM | OXYGEN SATURATION: 94 % | WEIGHT: 202.2 LBS | SYSTOLIC BLOOD PRESSURE: 132 MMHG | DIASTOLIC BLOOD PRESSURE: 86 MMHG | HEIGHT: 71 IN | HEART RATE: 55 BPM

## 2023-10-26 VITALS
OXYGEN SATURATION: 97 % | TEMPERATURE: 98 F | SYSTOLIC BLOOD PRESSURE: 123 MMHG | BODY MASS INDEX: 30.1 KG/M2 | RESPIRATION RATE: 18 BRPM | DIASTOLIC BLOOD PRESSURE: 86 MMHG | HEIGHT: 71 IN | WEIGHT: 215 LBS | HEART RATE: 78 BPM

## 2023-10-26 DIAGNOSIS — R04.0 EPISTAXIS: Primary | ICD-10-CM

## 2023-10-26 DIAGNOSIS — R04.0 BLEEDING FROM THE NOSE: Primary | ICD-10-CM

## 2023-10-26 DIAGNOSIS — Z91.89 BLEEDING RISK DUE TO ASPIRIN: ICD-10-CM

## 2023-10-26 LAB
ANION GAP SERPL CALC-SCNC: 12 MEQ/L (ref 8–16)
BASOPHILS ABSOLUTE: 0 THOU/MM3 (ref 0–0.1)
BASOPHILS NFR BLD AUTO: 0.3 %
BUN SERPL-MCNC: 42 MG/DL (ref 7–22)
CALCIUM SERPL-MCNC: 8.7 MG/DL (ref 8.5–10.5)
CHLORIDE SERPL-SCNC: 99 MEQ/L (ref 98–111)
CO2 SERPL-SCNC: 24 MEQ/L (ref 23–33)
CREAT SERPL-MCNC: 0.8 MG/DL (ref 0.4–1.2)
DEPRECATED RDW RBC AUTO: 51.8 FL (ref 35–45)
EKG ATRIAL RATE: 79 BPM
EKG P AXIS: 54 DEGREES
EKG P-R INTERVAL: 286 MS
EKG Q-T INTERVAL: 390 MS
EKG QRS DURATION: 116 MS
EKG QTC CALCULATION (BAZETT): 447 MS
EKG R AXIS: -87 DEGREES
EKG T AXIS: 43 DEGREES
EKG VENTRICULAR RATE: 79 BPM
EOSINOPHIL NFR BLD AUTO: 0.4 %
EOSINOPHILS ABSOLUTE: 0 THOU/MM3 (ref 0–0.4)
ERYTHROCYTE [DISTWIDTH] IN BLOOD BY AUTOMATED COUNT: 13.5 % (ref 11.5–14.5)
GFR SERPL CREATININE-BSD FRML MDRD: > 60 ML/MIN/1.73M2
GLUCOSE SERPL-MCNC: 103 MG/DL (ref 70–108)
HCT VFR BLD AUTO: 48.5 % (ref 42–52)
HGB BLD-MCNC: 16.3 GM/DL (ref 14–18)
IMM GRANULOCYTES # BLD AUTO: 0.05 THOU/MM3 (ref 0–0.07)
IMM GRANULOCYTES NFR BLD AUTO: 0.5 %
LYMPHOCYTES ABSOLUTE: 1.7 THOU/MM3 (ref 1–4.8)
LYMPHOCYTES NFR BLD AUTO: 15 %
MAGNESIUM SERPL-MCNC: 2.1 MG/DL (ref 1.6–2.4)
MCH RBC QN AUTO: 34.4 PG (ref 26–33)
MCHC RBC AUTO-ENTMCNC: 33.6 GM/DL (ref 32.2–35.5)
MCV RBC AUTO: 102.3 FL (ref 80–94)
MONOCYTES ABSOLUTE: 1 THOU/MM3 (ref 0.4–1.3)
MONOCYTES NFR BLD AUTO: 8.8 %
NEUTROPHILS NFR BLD AUTO: 75 %
NRBC BLD AUTO-RTO: 0 /100 WBC
OSMOLALITY SERPL CALC.SUM OF ELEC: 280.8 MOSMOL/KG (ref 275–300)
PLATELET # BLD AUTO: 174 THOU/MM3 (ref 130–400)
PMV BLD AUTO: 10.4 FL (ref 9.4–12.4)
POTASSIUM SERPL-SCNC: 4.5 MEQ/L (ref 3.5–5.2)
RBC # BLD AUTO: 4.74 MILL/MM3 (ref 4.7–6.1)
SEGMENTED NEUTROPHILS ABSOLUTE COUNT: 8.3 THOU/MM3 (ref 1.8–7.7)
SODIUM SERPL-SCNC: 135 MEQ/L (ref 135–145)
WBC # BLD AUTO: 11 THOU/MM3 (ref 4.8–10.8)

## 2023-10-26 PROCEDURE — 85025 COMPLETE CBC W/AUTO DIFF WBC: CPT

## 2023-10-26 PROCEDURE — 2580000003 HC RX 258: Performed by: EMERGENCY MEDICINE

## 2023-10-26 PROCEDURE — 36415 COLL VENOUS BLD VENIPUNCTURE: CPT

## 2023-10-26 PROCEDURE — 6360000002 HC RX W HCPCS: Performed by: EMERGENCY MEDICINE

## 2023-10-26 PROCEDURE — 96361 HYDRATE IV INFUSION ADD-ON: CPT

## 2023-10-26 PROCEDURE — 96374 THER/PROPH/DIAG INJ IV PUSH: CPT

## 2023-10-26 PROCEDURE — 6370000000 HC RX 637 (ALT 250 FOR IP): Performed by: EMERGENCY MEDICINE

## 2023-10-26 PROCEDURE — 80048 BASIC METABOLIC PNL TOTAL CA: CPT

## 2023-10-26 PROCEDURE — 99284 EMERGENCY DEPT VISIT MOD MDM: CPT

## 2023-10-26 PROCEDURE — 83735 ASSAY OF MAGNESIUM: CPT

## 2023-10-26 RX ORDER — ONDANSETRON 2 MG/ML
4 INJECTION INTRAMUSCULAR; INTRAVENOUS ONCE
Status: COMPLETED | OUTPATIENT
Start: 2023-10-26 | End: 2023-10-26

## 2023-10-26 RX ORDER — LIDOCAINE HYDROCHLORIDE 40 MG/ML
SOLUTION TOPICAL ONCE
Status: COMPLETED | OUTPATIENT
Start: 2023-10-26 | End: 2023-10-26

## 2023-10-26 RX ORDER — 0.9 % SODIUM CHLORIDE 0.9 %
500 INTRAVENOUS SOLUTION INTRAVENOUS ONCE
Status: COMPLETED | OUTPATIENT
Start: 2023-10-26 | End: 2023-10-26

## 2023-10-26 RX ORDER — OXYMETAZOLINE HYDROCHLORIDE 0.05 G/100ML
2 SPRAY NASAL ONCE
Status: COMPLETED | OUTPATIENT
Start: 2023-10-26 | End: 2023-10-26

## 2023-10-26 RX ADMIN — SODIUM CHLORIDE 500 ML: 9 INJECTION, SOLUTION INTRAVENOUS at 10:29

## 2023-10-26 RX ADMIN — ONDANSETRON 4 MG: 2 INJECTION INTRAMUSCULAR; INTRAVENOUS at 10:31

## 2023-10-26 RX ADMIN — OXYMETAZOLINE HCL 2 SPRAY: 0.05 SPRAY NASAL at 10:25

## 2023-10-26 RX ADMIN — LIDOCAINE HYDROCHLORIDE: 40 SOLUTION ORAL at 10:25

## 2023-10-26 ASSESSMENT — PAIN - FUNCTIONAL ASSESSMENT
PAIN_FUNCTIONAL_ASSESSMENT: NONE - DENIES PAIN
PAIN_FUNCTIONAL_ASSESSMENT: NONE - DENIES PAIN

## 2023-10-26 NOTE — ED NOTES
Medicated per Dr. Lamont reagan Doing at bedside for procedure.       Gary Case [place]  10/26/23 2632

## 2023-10-26 NOTE — ED NOTES
Pt ambulated to the bathroom without difficulty at this time, pt denies any needs. Call light within reach.       Pao Obrien  10/26/23 0727

## 2023-10-26 NOTE — ED TRIAGE NOTES
Pt presents to the ed with c/o nosebleed that has been ongoing for over 24hours. Pt states that this is his 3rd visit in 24 hours. Pt states that he wasn't able to see ENT today yet bc he hasnt been able to get the bleeding to stop. Pt has the right side of the nostril packed. Pt reports feeling dizzy and nauseous.

## 2023-10-26 NOTE — ED NOTES
Pt resting in bed at this ti,e, no active nosebleed noted. Call light within reach.       Gary Case RN  10/26/23 4576

## 2023-10-26 NOTE — DISCHARGE INSTRUCTIONS
1   Please immediately go to the ENT office, which is in the [facility] building, [address]. Our on-call ENT provider will see you as soon as you arrive. If your nosebleed starts again, please apply the clamp.

## 2023-10-26 NOTE — PATIENT INSTRUCTIONS
-Use Neosynephrine (phenylephrine) use 3 sprays to the right nostril, three times a day for 3 days starting tomorrow morning. You should use one dose tonight before bed  -Avoid blowing your nose for at least the next week. If you have a nosebleed at home, you should try the following:    Lightly blow your nose in attempts to dislodge old blood and blood clots  Spray 3 sprays of Afrin (oxymetazoline) or Neosynephrine (phenylephrine) to the bleeding nostril. If unsure which nostril is bleeding, spray both nostrils  Apply direct pressure to the nose, compressing both nostrils completely shut. You can use your fingers or nasal clamp  Hold pressure with your fingers/nasal clamp for at least 15 minutes. Do not release pressure to check if bleeding has resolved before that. If bleeding has not resolved after this, you can repeat steps 1-4. You can do this up to 3 times consecutively, but if bleeding still persists you should consider being evaluated in the ER or ENT office.

## 2023-10-26 NOTE — ED PROVIDER NOTES
[facility] EMERGENCY DEPT      CHIEF COMPLAINT       Chief Complaint   Patient presents with    Epistaxis     2nd time today being seen- this one since 430-500pm       Nurses Notes reviewed and I agree except as noted in the HPI. HISTORY OF PRESENT ILLNESS    Abigail Stewart is a 70 y.o. male who presents with complaint of epistaxis. Onset: Acute  Duration: 24 hours  Timing:   Location of Pain: No pain  Intesity/severity: Moderate  Modifying Factors: History of nosebleeds  Relieved by;  Previous Episodes; Tx Before arrival: None  REVIEW OF SYSTEMS        PAST MEDICAL HISTORY    has a past medical history of Angina at rest [facility]), Hypertension, Precordial pain, S/P cardiac cath: 1/25/2018: 70-75% mid-LAD. LCX OK. RCA 20%. LVEF 454-50%. LVEDP 12 mmHg., S/P PTCA: 1/25/2018: FFR-guided stenting of mid-LAD Xience 2.75x28 mm, post-dilated to 3.78 mm proximally. , and Thyroid disease. SURGICAL HISTORY      has a past surgical history that includes knee surgery (Bilateral); Cataract removal (Bilateral, 2017); joint replacement (Left, 2010); Percutaneous Transluminal Coronary Angio; Cataract removal (Bilateral); and Cardiac catheterization.     CURRENT MEDICATIONS       Previous Medications    ALBUTEROL SULFATE HFA (VENTOLIN HFA) 108 (90 BASE) MCG/ACT INHALER    Inhale 2 puffs into the lungs 4 times daily as needed for Wheezing    AMLODIPINE (NORVASC) 5 MG TABLET    TAKE 1 TABLET BY MOUTH ONE TIME A DAY    ASPIRIN 81 MG CHEWABLE TABLET    Take 1 tablet by mouth daily    ATORVASTATIN (LIPITOR) 80 MG TABLET    TAKE 1 TABLET BY MOUTH EVERY DAY AT NIGHT    BLOOD PRESSURE MONITORING (BLOOD PRESSURE MONITOR AUTOMAT) MARIANN    1 applicator by Does not apply route daily    ESCITALOPRAM (LEXAPRO) 10 MG TABLET    TAKE 1 TABLET BY MOUTH EVERY DAY    FOLIC ACID (FOLVITE) 1 MG TABLET    Take 1 tablet by mouth daily    FUROSEMIDE (LASIX) 20 MG TABLET    Take 1 tablet by mouth three times a week    LEVOTHYROXINE (SYNTHROID) 25 MCG

## 2023-10-26 NOTE — PROGRESS NOTES
[facility] [place] [facility]  [facility] EAR, NOSE AND THROAT  [address] [address]  Dept: [phone]  Dept Fax: [phone]  Loc: [phone]    Lazaro Henley is a 70 y.o. male who was referred by No ref. provider found for:  Chief Complaint   Patient presents with    Epistaxis     Patient here for epistaxis. Actively bleeding. Ada Pompa HPI:     Lazaro Henley presents today for ER follow up and packing removal. He presented to [facility] ED 3 separate occasions between 10/25 and 10/26 for evaluation of epistaxis. The bleeding was originally controlled with clamp and neosynephrine. He returned later that day he returned due to bleeding and was subsequently packed. HE returned to the ER today because packing was soaked with blood. He reports being on aspirin. he thinks being prescribed another blood thinner, but is uncertain of the name. He reports that he has another heart cath coming up in December. His last cath was in 2018 and had one stent placed at that time. He reports that he had nosebleeds back in 2021 and saw Dr Iasiah Samuel at that time. Cautery was performed to control the bleeding. He denies recurrence of epistaxis since that time until recently. Subjective:      REVIEW OF SYSTEMS:    Pertinent positives as noted in the HPI. All other systems reviewed and negative. ALLERGIES:  Patient has no known allergies. Past Medical History:  Past Medical History:   Diagnosis Date    Angina at rest 1/17/2018    Hypertension     Precordial pain 1/17/2018    S/P cardiac cath: 1/25/2018: 70-75% mid-LAD. LCX OK. RCA 20%. LVEF 454-50%. LVEDP 12 mmHg. 1/25/2018 1/25/2018: 70-75% mid-LAD. LCX OK. RCA 20%. LVEF 454-50%. LVEDP 12 mmHg. Dr. Steve Morris    S/P PTCA: 1/25/2018: FFR-guided stenting of mid-LAD Xience 2.75x28 mm, post-dilated to 3.78 mm proximally. 1/25/2018 1/25/2018: FFR-guided stenting of mid-LAD Xience 2.75x28 mm, post-dilated to 3.78 mm proximally.  Dr. Steve Morris

## 2023-11-03 ENCOUNTER — OFFICE VISIT (OUTPATIENT)
Dept: ENT CLINIC | Age: 78
End: 2023-11-03
Payer: MEDICARE

## 2023-11-03 VITALS
DIASTOLIC BLOOD PRESSURE: 76 MMHG | HEART RATE: 20 BPM | TEMPERATURE: 97.7 F | RESPIRATION RATE: 20 BRPM | WEIGHT: 205.3 LBS | BODY MASS INDEX: 28.74 KG/M2 | HEIGHT: 71 IN | SYSTOLIC BLOOD PRESSURE: 140 MMHG

## 2023-11-03 DIAGNOSIS — Z91.89 BLEEDING RISK DUE TO ASPIRIN: ICD-10-CM

## 2023-11-03 DIAGNOSIS — R04.0 EPISTAXIS: Primary | ICD-10-CM

## 2023-11-03 PROCEDURE — 1123F ACP DISCUSS/DSCN MKR DOCD: CPT | Performed by: PHYSICIAN ASSISTANT

## 2023-11-03 PROCEDURE — 99213 OFFICE O/P EST LOW 20 MIN: CPT | Performed by: PHYSICIAN ASSISTANT

## 2023-11-03 NOTE — PATIENT INSTRUCTIONS
-Use nasal saline 2 sprays to each nostril at least twice a day to keep the nose moist. You can use it more frequently to help with humidity in your nose    If you have a nosebleed at home, you should try the following:    Lightly blow your nose in attempts to dislodge old blood and blood clots  Spray 3 sprays of Afrin (oxymetazoline) to the bleeding nostril. If unsure which nostril is bleeding, spray both nostrils  Apply direct pressure to the nose, compressing both nostrils completely shut. You can use your fingers or nasal clamp  Hold pressure with your fingers/nasal clamp for at least 15 minutes. Do not release pressure to check if bleeding has resolved before that. If bleeding has not resolved after this, you can repeat steps 1-4. You can do this up to 3 times consecutively, but if bleeding still persists you should consider being evaluated in the ER or ENT office.

## 2023-11-03 NOTE — PROGRESS NOTES
[facility] [place] [facility]  [facility] EAR, NOSE AND THROAT  [address] [address]  Dept: [phone]  Dept Fax: [phone]  Loc: [phone]    Lidya Galeano is a 70 y.o. male who was referred by No ref. provider found for:  Chief Complaint   Patient presents with    Follow-up     Patient is here for 1 week f/u for epistaxis. Patient said that it has gotten better. Patient said he has not had any in the last 3-4 days. Talita Royal HPI:   Current visit documentation 11/03/2023:     Lidya Galeano presents today for follow up regarding epistaxis. He reports mild epistaxis for about 2 days after the the last appointment. He reports the nasal packing fell out about 3 days after the appointment. He reports a couple brief and minor episodes of epistaxis after that, but denies any further episodes the last 3-4 days. Previous visit documentation 10/26/23: Lidya Galeano presents today for ER follow up and packing removal. He presented to [facility] ED 3 separate occasions between 10/25 and 10/26 for evaluation of epistaxis. The bleeding was originally controlled with clamp and neosynephrine. He returned later that day he returned due to bleeding and was subsequently packed. HE returned to the ER today because packing was soaked with blood. He reports being on aspirin. he thinks being prescribed another blood thinner, but is uncertain of the name. He reports that he has another heart cath coming up in December. His last cath was in 2018 and had one stent placed at that time. He reports that he had nosebleeds back in 2021 and saw Dr Andreia Hopson at that time. Cautery was performed to control the bleeding. He denies recurrence of epistaxis since that time until recently. Subjective:      REVIEW OF SYSTEMS:    Pertinent positives as noted in the HPI. All other systems reviewed and negative. ALLERGIES:  Patient has no known allergies.     Past Medical History:  Past Medical History:

## 2023-11-07 ENCOUNTER — TELEPHONE (OUTPATIENT)
Dept: CARDIOLOGY CLINIC | Age: 78
End: 2023-11-07

## 2023-11-07 NOTE — TELEPHONE ENCOUNTER
PROCEDURE: cardiac cath     DATE OF SERVICE: 12/04/2023    SERVICE LOCATION: [facility]    CPT CODE: 68131    PHYSICIAN: DR CARIAS     DATE PRIOR AUTH SUBMITTED: 11/07/2023    STATUS: approved     CASE NUMBER: [id number]    AUTH NUMBER: [id number]    VALID: 11/07/2023-05/07/2023

## 2023-12-01 ENCOUNTER — PREP FOR PROCEDURE (OUTPATIENT)
Dept: CARDIOLOGY | Age: 78
End: 2023-12-01

## 2023-12-01 RX ORDER — NITROGLYCERIN 0.4 MG/1
0.4 TABLET SUBLINGUAL EVERY 5 MIN PRN
Status: CANCELLED | OUTPATIENT
Start: 2023-12-01

## 2023-12-01 RX ORDER — SODIUM CHLORIDE 9 MG/ML
INJECTION, SOLUTION INTRAVENOUS CONTINUOUS
Status: CANCELLED | OUTPATIENT
Start: 2023-12-01

## 2023-12-01 RX ORDER — SODIUM CHLORIDE 0.9 % (FLUSH) 0.9 %
5-40 SYRINGE (ML) INJECTION EVERY 12 HOURS SCHEDULED
Status: CANCELLED | OUTPATIENT
Start: 2023-12-01

## 2023-12-01 RX ORDER — ASPIRIN 325 MG
325 TABLET ORAL ONCE
Status: CANCELLED | OUTPATIENT
Start: 2023-12-01 | End: 2023-12-01

## 2023-12-01 RX ORDER — SODIUM CHLORIDE 0.9 % (FLUSH) 0.9 %
5-40 SYRINGE (ML) INJECTION PRN
Status: CANCELLED | OUTPATIENT
Start: 2023-12-01

## 2023-12-01 RX ORDER — SODIUM CHLORIDE 9 MG/ML
INJECTION, SOLUTION INTRAVENOUS PRN
Status: CANCELLED | OUTPATIENT
Start: 2023-12-01

## 2023-12-04 ENCOUNTER — HOSPITAL ENCOUNTER (OUTPATIENT)
Age: 78
Discharge: HOME | End: 2023-12-05
Attending: INTERNAL MEDICINE | Admitting: INTERNAL MEDICINE
Payer: MEDICARE

## 2023-12-04 DIAGNOSIS — R06.02 SOB (SHORTNESS OF BREATH): ICD-10-CM

## 2023-12-04 PROBLEM — I25.10 CAD S/P PERCUTANEOUS CORONARY ANGIOPLASTY: Status: ACTIVE | Noted: 2023-12-04

## 2023-12-04 PROBLEM — Z98.61 CAD S/P PERCUTANEOUS CORONARY ANGIOPLASTY: Status: ACTIVE | Noted: 2023-12-04

## 2023-12-04 LAB
ABO: NORMAL
ACTIVATED CLOTTING TIME: 201 SECONDS (ref 1–150)
ANION GAP SERPL CALC-SCNC: 15 MEQ/L (ref 8–16)
ANTIBODY SCREEN: NORMAL
BACTERIA URNS QL MICRO: ABNORMAL /HPF
BILIRUB UR QL STRIP.AUTO: NEGATIVE
BUN SERPL-MCNC: 11 MG/DL (ref 7–22)
CALCIUM SERPL-MCNC: 8.9 MG/DL (ref 8.5–10.5)
CASTS #/AREA URNS LPF: ABNORMAL /LPF
CASTS 2: ABNORMAL /LPF
CHARACTER UR: ABNORMAL
CHLORIDE SERPL-SCNC: 94 MEQ/L (ref 98–111)
CHOLEST SERPL-MCNC: 144 MG/DL (ref 100–199)
CO2 SERPL-SCNC: 23 MEQ/L (ref 23–33)
COLOR: YELLOW
CREAT SERPL-MCNC: 1 MG/DL (ref 0.4–1.2)
CRYSTALS URNS MICRO: ABNORMAL
DEPRECATED MEAN GLUCOSE BLD GHB EST-ACNC: 75 MG/DL (ref 70–126)
DEPRECATED RDW RBC AUTO: 47.5 FL (ref 35–45)
ECHO BSA: 2.08 M2
EPITHELIAL CELLS, UA: ABNORMAL /HPF
ERYTHROCYTE [DISTWIDTH] IN BLOOD BY AUTOMATED COUNT: 13.2 % (ref 11.5–14.5)
GFR SERPL CREATININE-BSD FRML MDRD: > 60 ML/MIN/1.73M2
GLUCOSE SERPL-MCNC: 102 MG/DL (ref 70–108)
GLUCOSE UR QL STRIP.AUTO: NEGATIVE MG/DL
HBA1C MFR BLD HPLC: 4.5 % (ref 4.4–6.4)
HCT VFR BLD AUTO: 42.6 % (ref 42–52)
HDLC SERPL-MCNC: 69 MG/DL
HGB BLD-MCNC: 14.6 GM/DL (ref 14–18)
HGB UR QL STRIP.AUTO: ABNORMAL
INR PPP: 1.07 (ref 0.85–1.13)
KETONES UR QL STRIP.AUTO: NEGATIVE
LDLC SERPL CALC-MCNC: 60 MG/DL
MCH RBC QN AUTO: 33.5 PG (ref 26–33)
MCHC RBC AUTO-ENTMCNC: 34.3 GM/DL (ref 32.2–35.5)
MCV RBC AUTO: 97.7 FL (ref 80–94)
MISCELLANEOUS 2: ABNORMAL
NITRITE UR QL STRIP: NEGATIVE
PH UR STRIP.AUTO: 5.5 [PH] (ref 5–9)
PLATELET # BLD AUTO: 156 THOU/MM3 (ref 130–400)
PMV BLD AUTO: 9.8 FL (ref 9.4–12.4)
POTASSIUM SERPL-SCNC: 4.4 MEQ/L (ref 3.5–5.2)
PROT UR STRIP.AUTO-MCNC: ABNORMAL MG/DL
RBC # BLD AUTO: 4.36 MILL/MM3 (ref 4.7–6.1)
RBC URINE: ABNORMAL /HPF
RENAL EPI CELLS #/AREA URNS HPF: ABNORMAL /[HPF]
RH FACTOR: NORMAL
SODIUM SERPL-SCNC: 132 MEQ/L (ref 135–145)
SP GR UR REFRACT.AUTO: 1.03 (ref 1–1.03)
TRIGL SERPL-MCNC: 73 MG/DL (ref 0–199)
UROBILINOGEN, URINE: 1 EU/DL (ref 0–1)
WBC # BLD AUTO: 7.9 THOU/MM3 (ref 4.8–10.8)
WBC #/AREA URNS HPF: > 200 /HPF
WBC #/AREA URNS HPF: ABNORMAL /[HPF]
YEAST LIKE FUNGI URNS QL MICRO: ABNORMAL

## 2023-12-04 PROCEDURE — C9600 PERC DRUG-EL COR STENT SING: HCPCS | Performed by: INTERNAL MEDICINE

## 2023-12-04 PROCEDURE — 83036 HEMOGLOBIN GLYCOSYLATED A1C: CPT

## 2023-12-04 PROCEDURE — 93005 ELECTROCARDIOGRAM TRACING: CPT | Performed by: NURSE PRACTITIONER

## 2023-12-04 PROCEDURE — 6360000004 HC RX CONTRAST MEDICATION: Performed by: INTERNAL MEDICINE

## 2023-12-04 PROCEDURE — 86900 BLOOD TYPING SEROLOGIC ABO: CPT

## 2023-12-04 PROCEDURE — 99152 MOD SED SAME PHYS/QHP 5/>YRS: CPT | Performed by: INTERNAL MEDICINE

## 2023-12-04 PROCEDURE — 99153 MOD SED SAME PHYS/QHP EA: CPT | Performed by: INTERNAL MEDICINE

## 2023-12-04 PROCEDURE — C1725 CATH, TRANSLUMIN NON-LASER: HCPCS | Performed by: INTERNAL MEDICINE

## 2023-12-04 PROCEDURE — C1760 CLOSURE DEV, VASC: HCPCS | Performed by: INTERNAL MEDICINE

## 2023-12-04 PROCEDURE — 85610 PROTHROMBIN TIME: CPT

## 2023-12-04 PROCEDURE — 87086 URINE CULTURE/COLONY COUNT: CPT

## 2023-12-04 PROCEDURE — 6370000000 HC RX 637 (ALT 250 FOR IP): Performed by: INTERNAL MEDICINE

## 2023-12-04 PROCEDURE — 93005 ELECTROCARDIOGRAM TRACING: CPT | Performed by: INTERNAL MEDICINE

## 2023-12-04 PROCEDURE — 92928 PRQ TCAT PLMT NTRAC ST 1 LES: CPT | Performed by: INTERNAL MEDICINE

## 2023-12-04 PROCEDURE — C1887 CATHETER, GUIDING: HCPCS | Performed by: INTERNAL MEDICINE

## 2023-12-04 PROCEDURE — C1769 GUIDE WIRE: HCPCS | Performed by: INTERNAL MEDICINE

## 2023-12-04 PROCEDURE — 86850 RBC ANTIBODY SCREEN: CPT

## 2023-12-04 PROCEDURE — 93454 CORONARY ARTERY ANGIO S&I: CPT | Performed by: INTERNAL MEDICINE

## 2023-12-04 PROCEDURE — 93458 L HRT ARTERY/VENTRICLE ANGIO: CPT | Performed by: INTERNAL MEDICINE

## 2023-12-04 PROCEDURE — C1874 STENT, COATED/COV W/DEL SYS: HCPCS | Performed by: INTERNAL MEDICINE

## 2023-12-04 PROCEDURE — 2580000003 HC RX 258: Performed by: NURSE PRACTITIONER

## 2023-12-04 PROCEDURE — 85347 COAGULATION TIME ACTIVATED: CPT

## 2023-12-04 PROCEDURE — 6370000000 HC RX 637 (ALT 250 FOR IP): Performed by: NURSE PRACTITIONER

## 2023-12-04 PROCEDURE — 86901 BLOOD TYPING SEROLOGIC RH(D): CPT

## 2023-12-04 PROCEDURE — C1894 INTRO/SHEATH, NON-LASER: HCPCS | Performed by: INTERNAL MEDICINE

## 2023-12-04 PROCEDURE — 2709999900 HC NON-CHARGEABLE SUPPLY: Performed by: INTERNAL MEDICINE

## 2023-12-04 PROCEDURE — 36415 COLL VENOUS BLD VENIPUNCTURE: CPT

## 2023-12-04 PROCEDURE — 85027 COMPLETE CBC AUTOMATED: CPT

## 2023-12-04 PROCEDURE — 80048 BASIC METABOLIC PNL TOTAL CA: CPT

## 2023-12-04 PROCEDURE — 81001 URINALYSIS AUTO W/SCOPE: CPT

## 2023-12-04 PROCEDURE — 6360000002 HC RX W HCPCS: Performed by: INTERNAL MEDICINE

## 2023-12-04 PROCEDURE — 87077 CULTURE AEROBIC IDENTIFY: CPT

## 2023-12-04 PROCEDURE — 80061 LIPID PANEL: CPT

## 2023-12-04 PROCEDURE — 7100000010 HC PHASE II RECOVERY - FIRST 15 MIN: Performed by: INTERNAL MEDICINE

## 2023-12-04 PROCEDURE — 7100000011 HC PHASE II RECOVERY - ADDTL 15 MIN: Performed by: INTERNAL MEDICINE

## 2023-12-04 PROCEDURE — 87186 SC STD MICRODIL/AGAR DIL: CPT

## 2023-12-04 PROCEDURE — 93010 ELECTROCARDIOGRAM REPORT: CPT | Performed by: INTERNAL MEDICINE

## 2023-12-04 DEVICE — STENT ONYXNG25012UX ONYX 2.50X12RX
Type: IMPLANTABLE DEVICE | Status: FUNCTIONAL
Brand: ONYX FRONTIER™

## 2023-12-04 RX ORDER — NITROGLYCERIN 0.4 MG/1
0.4 TABLET SUBLINGUAL EVERY 5 MIN PRN
Status: DISCONTINUED | OUTPATIENT
Start: 2023-12-04 | End: 2023-12-04 | Stop reason: SDUPTHER

## 2023-12-04 RX ORDER — AMLODIPINE BESYLATE 5 MG/1
5 TABLET ORAL DAILY
Status: DISCONTINUED | OUTPATIENT
Start: 2023-12-04 | End: 2023-12-05 | Stop reason: HOSPADM

## 2023-12-04 RX ORDER — LEVOTHYROXINE SODIUM 0.03 MG/1
25 TABLET ORAL DAILY
Status: DISCONTINUED | OUTPATIENT
Start: 2023-12-04 | End: 2023-12-05 | Stop reason: HOSPADM

## 2023-12-04 RX ORDER — FUROSEMIDE 20 MG/1
20 TABLET ORAL
Status: DISCONTINUED | OUTPATIENT
Start: 2023-12-04 | End: 2023-12-05 | Stop reason: HOSPADM

## 2023-12-04 RX ORDER — SODIUM CHLORIDE 1 G/1
1 TABLET ORAL 2 TIMES DAILY WITH MEALS
Status: DISCONTINUED | OUTPATIENT
Start: 2023-12-04 | End: 2023-12-05 | Stop reason: HOSPADM

## 2023-12-04 RX ORDER — TAMSULOSIN HYDROCHLORIDE 0.4 MG/1
0.4 CAPSULE ORAL DAILY
Status: DISCONTINUED | OUTPATIENT
Start: 2023-12-04 | End: 2023-12-05 | Stop reason: HOSPADM

## 2023-12-04 RX ORDER — ESCITALOPRAM OXALATE 10 MG/1
10 TABLET ORAL DAILY
Status: DISCONTINUED | OUTPATIENT
Start: 2023-12-04 | End: 2023-12-05 | Stop reason: HOSPADM

## 2023-12-04 RX ORDER — SODIUM CHLORIDE 0.9 % (FLUSH) 0.9 %
5-40 SYRINGE (ML) INJECTION PRN
Status: DISCONTINUED | OUTPATIENT
Start: 2023-12-04 | End: 2023-12-05 | Stop reason: HOSPADM

## 2023-12-04 RX ORDER — HEPARIN SODIUM 1000 [USP'U]/ML
INJECTION, SOLUTION INTRAVENOUS; SUBCUTANEOUS PRN
Status: DISCONTINUED | OUTPATIENT
Start: 2023-12-04 | End: 2023-12-04 | Stop reason: HOSPADM

## 2023-12-04 RX ORDER — ASPIRIN 325 MG
325 TABLET ORAL ONCE
Status: COMPLETED | OUTPATIENT
Start: 2023-12-04 | End: 2023-12-04

## 2023-12-04 RX ORDER — SODIUM CHLORIDE 0.9 % (FLUSH) 0.9 %
5-40 SYRINGE (ML) INJECTION EVERY 12 HOURS SCHEDULED
Status: DISCONTINUED | OUTPATIENT
Start: 2023-12-04 | End: 2023-12-04 | Stop reason: SDUPTHER

## 2023-12-04 RX ORDER — SODIUM CHLORIDE 0.9 % (FLUSH) 0.9 %
5-40 SYRINGE (ML) INJECTION PRN
Status: DISCONTINUED | OUTPATIENT
Start: 2023-12-04 | End: 2023-12-04 | Stop reason: SDUPTHER

## 2023-12-04 RX ORDER — ADENOSINE 3 MG/ML
INJECTION, SOLUTION INTRAVENOUS PRN
Status: DISCONTINUED | OUTPATIENT
Start: 2023-12-04 | End: 2023-12-04 | Stop reason: HOSPADM

## 2023-12-04 RX ORDER — FOLIC ACID 1 MG/1
1 TABLET ORAL DAILY
Status: DISCONTINUED | OUTPATIENT
Start: 2023-12-04 | End: 2023-12-05 | Stop reason: HOSPADM

## 2023-12-04 RX ORDER — ACETAMINOPHEN 325 MG/1
650 TABLET ORAL EVERY 4 HOURS PRN
Status: DISCONTINUED | OUTPATIENT
Start: 2023-12-04 | End: 2023-12-05 | Stop reason: HOSPADM

## 2023-12-04 RX ORDER — METOPROLOL SUCCINATE 50 MG/1
50 TABLET, EXTENDED RELEASE ORAL DAILY
Status: DISCONTINUED | OUTPATIENT
Start: 2023-12-05 | End: 2023-12-05 | Stop reason: HOSPADM

## 2023-12-04 RX ORDER — ATORVASTATIN CALCIUM 80 MG/1
80 TABLET, FILM COATED ORAL DAILY
Status: DISCONTINUED | OUTPATIENT
Start: 2023-12-04 | End: 2023-12-05 | Stop reason: HOSPADM

## 2023-12-04 RX ORDER — SODIUM CHLORIDE 9 MG/ML
INJECTION, SOLUTION INTRAVENOUS PRN
Status: DISCONTINUED | OUTPATIENT
Start: 2023-12-04 | End: 2023-12-04 | Stop reason: SDUPTHER

## 2023-12-04 RX ORDER — ASPIRIN 81 MG/1
81 TABLET, CHEWABLE ORAL DAILY
Status: DISCONTINUED | OUTPATIENT
Start: 2023-12-05 | End: 2023-12-05 | Stop reason: HOSPADM

## 2023-12-04 RX ORDER — PHENYLEPHRINE HCL IN 0.9% NACL 1 MG/10 ML
VIAL (ML) INTRAVENOUS PRN
Status: DISCONTINUED | OUTPATIENT
Start: 2023-12-04 | End: 2023-12-04 | Stop reason: HOSPADM

## 2023-12-04 RX ORDER — FENTANYL CITRATE 50 UG/ML
INJECTION, SOLUTION INTRAMUSCULAR; INTRAVENOUS PRN
Status: DISCONTINUED | OUTPATIENT
Start: 2023-12-04 | End: 2023-12-04 | Stop reason: HOSPADM

## 2023-12-04 RX ORDER — PRASUGREL 10 MG/1
10 TABLET, FILM COATED ORAL DAILY
Status: DISCONTINUED | OUTPATIENT
Start: 2023-12-05 | End: 2023-12-05 | Stop reason: HOSPADM

## 2023-12-04 RX ORDER — SODIUM CHLORIDE 0.9 % (FLUSH) 0.9 %
5-40 SYRINGE (ML) INJECTION EVERY 12 HOURS SCHEDULED
Status: DISCONTINUED | OUTPATIENT
Start: 2023-12-04 | End: 2023-12-05 | Stop reason: HOSPADM

## 2023-12-04 RX ORDER — NITROGLYCERIN 0.4 MG/1
0.4 TABLET SUBLINGUAL EVERY 5 MIN PRN
Status: DISCONTINUED | OUTPATIENT
Start: 2023-12-04 | End: 2023-12-05 | Stop reason: HOSPADM

## 2023-12-04 RX ORDER — SODIUM CHLORIDE 9 MG/ML
INJECTION, SOLUTION INTRAVENOUS CONTINUOUS
Status: DISCONTINUED | OUTPATIENT
Start: 2023-12-04 | End: 2023-12-05 | Stop reason: HOSPADM

## 2023-12-04 RX ORDER — PRASUGREL 10 MG/1
10 TABLET, FILM COATED ORAL DAILY
Status: ON HOLD | COMMUNITY
End: 2023-12-05 | Stop reason: SDUPTHER

## 2023-12-04 RX ORDER — MIDAZOLAM HYDROCHLORIDE 1 MG/ML
INJECTION INTRAMUSCULAR; INTRAVENOUS PRN
Status: DISCONTINUED | OUTPATIENT
Start: 2023-12-04 | End: 2023-12-04 | Stop reason: HOSPADM

## 2023-12-04 RX ORDER — ALBUTEROL SULFATE 90 UG/1
2 AEROSOL, METERED RESPIRATORY (INHALATION) 4 TIMES DAILY PRN
Status: DISCONTINUED | OUTPATIENT
Start: 2023-12-04 | End: 2023-12-05 | Stop reason: HOSPADM

## 2023-12-04 RX ORDER — SODIUM CHLORIDE 9 MG/ML
INJECTION, SOLUTION INTRAVENOUS PRN
Status: DISCONTINUED | OUTPATIENT
Start: 2023-12-04 | End: 2023-12-05 | Stop reason: HOSPADM

## 2023-12-04 RX ORDER — NITROGLYCERIN 20 MG/100ML
INJECTION INTRAVENOUS CONTINUOUS PRN
Status: DISCONTINUED | OUTPATIENT
Start: 2023-12-04 | End: 2023-12-04 | Stop reason: HOSPADM

## 2023-12-04 RX ORDER — PRASUGREL 10 MG/1
10 TABLET, FILM COATED ORAL DAILY
Status: DISCONTINUED | OUTPATIENT
Start: 2023-12-04 | End: 2023-12-04 | Stop reason: SDUPTHER

## 2023-12-04 RX ORDER — ATORVASTATIN CALCIUM 80 MG/1
80 TABLET, FILM COATED ORAL NIGHTLY
Status: DISCONTINUED | OUTPATIENT
Start: 2023-12-04 | End: 2023-12-04 | Stop reason: SDUPTHER

## 2023-12-04 RX ORDER — PRASUGREL 10 MG/1
TABLET, FILM COATED ORAL PRN
Status: DISCONTINUED | OUTPATIENT
Start: 2023-12-04 | End: 2023-12-04 | Stop reason: HOSPADM

## 2023-12-04 RX ADMIN — ASPIRIN 325 MG: 325 TABLET ORAL at 07:10

## 2023-12-04 RX ADMIN — TAMSULOSIN HYDROCHLORIDE 0.4 MG: 0.4 CAPSULE ORAL at 13:13

## 2023-12-04 RX ADMIN — FOLIC ACID 1 MG: 1 TABLET ORAL at 15:32

## 2023-12-04 RX ADMIN — LEVOTHYROXINE SODIUM 25 MCG: 0.03 TABLET ORAL at 13:13

## 2023-12-04 RX ADMIN — ATORVASTATIN CALCIUM 80 MG: 40 TABLET, FILM COATED ORAL at 19:39

## 2023-12-04 RX ADMIN — SODIUM CHLORIDE: 9 INJECTION, SOLUTION INTRAVENOUS at 07:00

## 2023-12-04 RX ADMIN — SODIUM CHLORIDE 1 G: 1 TABLET ORAL at 19:40

## 2023-12-04 RX ADMIN — FUROSEMIDE 20 MG: 20 TABLET ORAL at 15:32

## 2023-12-04 RX ADMIN — ACETAMINOPHEN 650 MG: 325 TABLET ORAL at 19:40

## 2023-12-04 RX ADMIN — AMLODIPINE BESYLATE 5 MG: 5 TABLET ORAL at 15:32

## 2023-12-04 RX ADMIN — ESCITALOPRAM OXALATE 10 MG: 10 TABLET ORAL at 15:32

## 2023-12-04 ASSESSMENT — PAIN SCALES - GENERAL
PAINLEVEL_OUTOF10: 0

## 2023-12-04 NOTE — PROGRESS NOTES
Inpatient Cardiac Rehabilitation Consult    Received consult for Phase II Cardiac Rehabilitation. Patient needs cardiac rehab due to PCI on 12/4/23. Importance of Cardiac Rehab discussed with patient. Reviewed cardiac rehab class times. Patient questions answered. We will contact patient at home to schedule evaluation appointment. Cardiac Rehab brochure given.

## 2023-12-04 NOTE — PROGRESS NOTES
0600 Patient admitted to 2E01  ambulatory for Heart Cath. Patient NPO. Patient accompanied by daughter, Monse Ratliff. Vital signs obtained. Assessment and data collection intiated. Oriented to room. Policies and procedures for 2E explained. All questions answered with no further questions at this time. Fall prevention and safety precautions discussed with patient.

## 2023-12-04 NOTE — PROGRESS NOTES
5113 Care taken over from cath lab, right groin stable . Patient reinstructed on bedrest, instructed to keep legs straight, not to cross legs, not to lift head off of pillow, not to laugh hard, if coughs to guard site with hand, voices understanding.

## 2023-12-04 NOTE — H&P
rubs   NYHA: 2  Lungs: Clear to auscultation    Abdomen: BS present, without HSM, masses, or tenderness    Extremities: without C,C,E.  Pulses 2+ bilaterally  Mental Status: Alert & Oriented        PLANNED PROCEDURE   [x]Cath  [x]PCI                []Pacemaker/AICD  []NAOMI             []Cardioversion []Peripheral angiography/PTA  []Other:      SEDATION  Planned agent:[x]Midazolam []Meperidine [x]Sublimaze []Morphine  []Diazepam  []Other:     ASA Classification:  []1 []2 [x]3 []4 []5  Class 1: A normal healthy patient  Class 2: Pt with mild to moderate systemic disease  Class 3: Severe systemic disease or disturbance  Class 4: Severe systemic disorders that are already life threatening. Class 5: Moribund pt with little chances of survival, for more than 24 hours. Mallampati I Airway Classification:   []1 []2 [x]3 []4    [x]Pre-procedure diagnostic studies complete and results available. Comment:    [x]Previous sedation/anesthesia experiences assessed. Comment:    [x]The patient is an appropriate candidate to undergo the planned procedure sedation and anesthesia. (Refer to nursing sedation/analgesia documentation record)  [x]Formulation and discussion of sedation/procedure plan, risks, and expectations with patient and/or responsible adult completed. [x]Patient examined immediately prior to the procedure.  (Refer to nursing sedation/analgesia documentation record)    Colt Mohr MD MD   Electronically signed 12/4/2023 at 7:05 AM

## 2023-12-05 VITALS
WEIGHT: 189.6 LBS | BODY MASS INDEX: 26.54 KG/M2 | OXYGEN SATURATION: 98 % | HEART RATE: 61 BPM | RESPIRATION RATE: 18 BRPM | HEIGHT: 71 IN | TEMPERATURE: 97.9 F | DIASTOLIC BLOOD PRESSURE: 86 MMHG | SYSTOLIC BLOOD PRESSURE: 153 MMHG

## 2023-12-05 LAB
ANION GAP SERPL CALC-SCNC: 10 MEQ/L (ref 8–16)
BUN SERPL-MCNC: 9 MG/DL (ref 7–22)
CALCIUM SERPL-MCNC: 8.3 MG/DL (ref 8.5–10.5)
CHLORIDE SERPL-SCNC: 95 MEQ/L (ref 98–111)
CHOLEST SERPL-MCNC: 126 MG/DL (ref 100–199)
CO2 SERPL-SCNC: 23 MEQ/L (ref 23–33)
CREAT SERPL-MCNC: 0.9 MG/DL (ref 0.4–1.2)
DEPRECATED RDW RBC AUTO: 48.2 FL (ref 35–45)
ERYTHROCYTE [DISTWIDTH] IN BLOOD BY AUTOMATED COUNT: 13.2 % (ref 11.5–14.5)
GFR SERPL CREATININE-BSD FRML MDRD: > 60 ML/MIN/1.73M2
GLUCOSE SERPL-MCNC: 91 MG/DL (ref 70–108)
HCT VFR BLD AUTO: 42 % (ref 42–52)
HDLC SERPL-MCNC: 56 MG/DL
HGB BLD-MCNC: 14 GM/DL (ref 14–18)
LDLC SERPL CALC-MCNC: 56 MG/DL
MCH RBC QN AUTO: 33.2 PG (ref 26–33)
MCHC RBC AUTO-ENTMCNC: 33.3 GM/DL (ref 32.2–35.5)
MCV RBC AUTO: 99.5 FL (ref 80–94)
PLATELET # BLD AUTO: 144 THOU/MM3 (ref 130–400)
PMV BLD AUTO: 10.1 FL (ref 9.4–12.4)
POTASSIUM SERPL-SCNC: 4.6 MEQ/L (ref 3.5–5.2)
RBC # BLD AUTO: 4.22 MILL/MM3 (ref 4.7–6.1)
SODIUM SERPL-SCNC: 128 MEQ/L (ref 135–145)
TRIGL SERPL-MCNC: 68 MG/DL (ref 0–199)
WBC # BLD AUTO: 7.2 THOU/MM3 (ref 4.8–10.8)

## 2023-12-05 PROCEDURE — 51798 US URINE CAPACITY MEASURE: CPT

## 2023-12-05 PROCEDURE — 85027 COMPLETE CBC AUTOMATED: CPT

## 2023-12-05 PROCEDURE — 80061 LIPID PANEL: CPT

## 2023-12-05 PROCEDURE — 80048 BASIC METABOLIC PNL TOTAL CA: CPT

## 2023-12-05 PROCEDURE — 6370000000 HC RX 637 (ALT 250 FOR IP): Performed by: INTERNAL MEDICINE

## 2023-12-05 PROCEDURE — 99239 HOSP IP/OBS DSCHRG MGMT >30: CPT | Performed by: NURSE PRACTITIONER

## 2023-12-05 PROCEDURE — 36415 COLL VENOUS BLD VENIPUNCTURE: CPT

## 2023-12-05 RX ORDER — AMLODIPINE BESYLATE 5 MG/1
TABLET ORAL
Qty: 30 TABLET | Refills: 3 | Status: SHIPPED | OUTPATIENT
Start: 2023-12-05

## 2023-12-05 RX ORDER — ESCITALOPRAM OXALATE 10 MG/1
10 TABLET ORAL DAILY
Qty: 30 TABLET | Refills: 0 | Status: SHIPPED | OUTPATIENT
Start: 2023-12-05

## 2023-12-05 RX ORDER — SODIUM CHLORIDE 1 G/1
1 TABLET ORAL 2 TIMES DAILY WITH MEALS
Qty: 90 TABLET | Refills: 0 | Status: SHIPPED | OUTPATIENT
Start: 2023-12-05

## 2023-12-05 RX ORDER — ASPIRIN 81 MG/1
81 TABLET, CHEWABLE ORAL DAILY
Qty: 30 TABLET | Refills: 0 | Status: SHIPPED | OUTPATIENT
Start: 2023-12-05

## 2023-12-05 RX ORDER — PRASUGREL 10 MG/1
10 TABLET, FILM COATED ORAL DAILY
Qty: 30 TABLET | Refills: 3 | Status: SHIPPED | OUTPATIENT
Start: 2023-12-05

## 2023-12-05 RX ORDER — ALBUTEROL SULFATE 90 UG/1
2 AEROSOL, METERED RESPIRATORY (INHALATION) 4 TIMES DAILY PRN
Qty: 54 G | Refills: 0 | Status: SHIPPED | OUTPATIENT
Start: 2023-12-05

## 2023-12-05 RX ORDER — ATORVASTATIN CALCIUM 80 MG/1
TABLET, FILM COATED ORAL
Qty: 90 TABLET | Refills: 3 | Status: SHIPPED | OUTPATIENT
Start: 2023-12-05

## 2023-12-05 RX ORDER — TAMSULOSIN HYDROCHLORIDE 0.4 MG/1
0.4 CAPSULE ORAL DAILY
Qty: 30 CAPSULE | Refills: 0 | Status: SHIPPED | OUTPATIENT
Start: 2023-12-05

## 2023-12-05 RX ORDER — FUROSEMIDE 20 MG/1
20 TABLET ORAL
Qty: 90 TABLET | Refills: 3 | Status: SHIPPED | OUTPATIENT
Start: 2023-12-06

## 2023-12-05 RX ORDER — METOPROLOL SUCCINATE 50 MG/1
50 TABLET, EXTENDED RELEASE ORAL DAILY
Qty: 30 TABLET | Refills: 3 | Status: SHIPPED | OUTPATIENT
Start: 2023-12-05

## 2023-12-05 RX ORDER — FOLIC ACID 1 MG/1
1 TABLET ORAL DAILY
Qty: 30 TABLET | Refills: 0 | Status: SHIPPED | OUTPATIENT
Start: 2023-12-05

## 2023-12-05 RX ORDER — NITROGLYCERIN 0.4 MG/1
0.4 TABLET SUBLINGUAL EVERY 5 MIN PRN
Qty: 25 TABLET | Refills: 1 | Status: SHIPPED | OUTPATIENT
Start: 2023-12-05

## 2023-12-05 RX ORDER — LEVOTHYROXINE SODIUM 0.03 MG/1
25 TABLET ORAL DAILY
Qty: 30 TABLET | Refills: 0 | Status: SHIPPED | OUTPATIENT
Start: 2023-12-05

## 2023-12-05 RX ORDER — TIOTROPIUM BROMIDE 18 UG/1
18 CAPSULE ORAL; RESPIRATORY (INHALATION) DAILY
Qty: 90 CAPSULE | Refills: 0 | Status: SHIPPED | OUTPATIENT
Start: 2023-12-05

## 2023-12-05 RX ADMIN — SODIUM CHLORIDE 1 G: 1 TABLET ORAL at 07:35

## 2023-12-05 RX ADMIN — PRASUGREL 10 MG: 10 TABLET, FILM COATED ORAL at 07:34

## 2023-12-05 RX ADMIN — AMLODIPINE BESYLATE 5 MG: 5 TABLET ORAL at 07:34

## 2023-12-05 RX ADMIN — METOPROLOL SUCCINATE 50 MG: 50 TABLET, EXTENDED RELEASE ORAL at 07:34

## 2023-12-05 RX ADMIN — ASPIRIN 81 MG: 81 TABLET, CHEWABLE ORAL at 07:33

## 2023-12-05 RX ADMIN — LEVOTHYROXINE SODIUM 25 MCG: 0.03 TABLET ORAL at 06:35

## 2023-12-05 RX ADMIN — TAMSULOSIN HYDROCHLORIDE 0.4 MG: 0.4 CAPSULE ORAL at 07:34

## 2023-12-05 RX ADMIN — FOLIC ACID 1 MG: 1 TABLET ORAL at 07:34

## 2023-12-05 RX ADMIN — ESCITALOPRAM OXALATE 10 MG: 10 TABLET ORAL at 07:34

## 2023-12-05 ASSESSMENT — PAIN SCALES - GENERAL
PAINLEVEL_OUTOF10: 0
PAINLEVEL_OUTOF10: 0

## 2023-12-05 NOTE — PLAN OF CARE
Problem: Pain  Goal: Verbalizes/displays adequate comfort level or baseline comfort level  Outcome: Progressing     Problem: Chronic Conditions and Co-morbidities  Goal: Patient's chronic conditions and co-morbidity symptoms are monitored and maintained or improved  Outcome: Progressing     Problem: Safety - Adult  Goal: Free from fall injury  Outcome: Progressing     Problem: Discharge Planning  Goal: Discharge to home or other facility with appropriate resources  Outcome: Progressing   Care plan reviewed with patient. Patient verbalize understanding of the plan of care and contribute to goal setting.

## 2023-12-05 NOTE — PROGRESS NOTES
Educated on discharge instructions, post op restrictions, medications, and follow up appointments. Patient received pamphlet about cardiac intervention, how to take care of the incision site, mended hearts program, cardiac rehab and the hours of operations, and Nutritional information regarding cardiac diet. MI teaching done reviewing causes, signs, symptoms, and risk factors. No further questions or concerns voiced. Discharged to home with friend.

## 2023-12-06 LAB
EKG ATRIAL RATE: 66 BPM
EKG ATRIAL RATE: 66 BPM
EKG P AXIS: 55 DEGREES
EKG P AXIS: 63 DEGREES
EKG P-R INTERVAL: 296 MS
EKG P-R INTERVAL: 368 MS
EKG Q-T INTERVAL: 412 MS
EKG Q-T INTERVAL: 426 MS
EKG QRS DURATION: 112 MS
EKG QRS DURATION: 126 MS
EKG QTC CALCULATION (BAZETT): 431 MS
EKG QTC CALCULATION (BAZETT): 446 MS
EKG R AXIS: -78 DEGREES
EKG R AXIS: -83 DEGREES
EKG T AXIS: 57 DEGREES
EKG T AXIS: 72 DEGREES
EKG VENTRICULAR RATE: 66 BPM
EKG VENTRICULAR RATE: 66 BPM

## 2023-12-07 LAB
BACTERIA UR CULT: ABNORMAL
ORGANISM: ABNORMAL

## 2023-12-12 ENCOUNTER — TELEPHONE (OUTPATIENT)
Dept: CARDIAC REHAB | Age: 78
End: 2023-12-12

## 2024-01-17 ENCOUNTER — PROCEDURE VISIT (OUTPATIENT)
Dept: CARDIOLOGY CLINIC | Age: 79
End: 2024-01-17

## 2024-01-17 DIAGNOSIS — Z95.0 PACEMAKER: Primary | ICD-10-CM

## 2024-01-17 NOTE — PROGRESS NOTES
[facility] Medtronic Dual Pacemaker   Patient of Alejandra    Battery 14 years    Presenting rhythm AS     A Impedance 475  RV Impedance 494    P wave sensing 1.9  R wave sensing 15.6    A Threshold 0.625 @ 0.40  A Amplitude 1.50 @ 0.40  RV Thresholds 0.75 @ 0.40  RV Amplitude 2.0 @ 0.40      A Paced 5.8%  V Paced 31.7%    Programmed Mode AAI <=> DDD       Afib [place] <0.1%    Episodes   none

## 2024-04-23 PROCEDURE — 93294 REM INTERROG EVL PM/LDLS PM: CPT | Performed by: INTERNAL MEDICINE

## 2024-04-23 PROCEDURE — 93296 REM INTERROG EVL PM/IDS: CPT | Performed by: INTERNAL MEDICINE

## 2024-04-24 ENCOUNTER — PROCEDURE VISIT (OUTPATIENT)
Dept: CARDIOLOGY CLINIC | Age: 79
End: 2024-04-24
Payer: MEDICARE

## 2024-04-24 DIAGNOSIS — Z95.0 PACEMAKER: Primary | ICD-10-CM

## 2024-04-24 NOTE — PROGRESS NOTES
[employer] Dual Pacemaker 3830  Patient of Alejandra    Battery 13.8 years    Presenting rhythm ASVS    A Impedance 456  RV Impedance 513    P wave sensing 2.8  R wave sensing 15.8    A Threshold 0.625 @ 0.40  A Amplitude 1.50 @ 0.40  RV Thresholds 0.75 @ 0.40  RV Amplitude 2.0 @ 0.40      A Paced 2.1%  V Paced 26.9%    Programmed Mode AAI <=> DDD     Afib [place] <0.1%    Episodes   2/11/24- 10 beats VT rates 181  3/5/24 - 27 seconds AFlutter

## 2024-04-29 ENCOUNTER — TELEPHONE (OUTPATIENT)
Dept: CARDIOLOGY CLINIC | Age: 79
End: 2024-04-29

## 2024-04-29 NOTE — TELEPHONE ENCOUNTER
Madhu Alejandra MD at 4/28/2024  2:08 PM    Status: Signed   Increase metop to 100 mg XL q day        [facility] Medtronic Dual Pacemaker 3830  Patient of Alejandra     Battery 13.8 years     Presenting rhythm ASVS     A Impedance 456  RV Impedance 513     P wave sensing 2.8  R wave sensing 15.8     A Threshold 0.625 @ 0.40  A Amplitude 1.50 @ 0.40  RV Thresholds 0.75 @ 0.40  RV Amplitude 2.0 @ 0.40        A Paced 2.1%  V Paced 26.9%     Programmed Mode AAI <=> DDD      Afib [place] <0.1%     Episodes   2/11/24- 10 beats VT rates 181  3/5/24 - 27 seconds AFlutter

## 2024-04-30 RX ORDER — METOPROLOL SUCCINATE 50 MG/1
50 TABLET, EXTENDED RELEASE ORAL DAILY
Qty: 90 TABLET | Refills: 3 | Status: SHIPPED | OUTPATIENT
Start: 2024-04-30

## 2024-04-30 NOTE — TELEPHONE ENCOUNTER
So he did not have any medication in his bag that starts with an \"m\" or a \"t\".  I spelled both for him, he does not think he's taking the toprol    New Rx for toprol 50/daily sent to [facility] in separate encounter  Ok to leave on toprol 50 daily? Or do you want it increased to 100mg daily?     Ok for life alert button

## 2024-07-30 PROCEDURE — 93296 REM INTERROG EVL PM/IDS: CPT | Performed by: INTERNAL MEDICINE

## 2024-07-30 PROCEDURE — 93294 REM INTERROG EVL PM/LDLS PM: CPT | Performed by: INTERNAL MEDICINE

## 2024-07-31 ENCOUNTER — PROCEDURE VISIT (OUTPATIENT)
Dept: CARDIOLOGY CLINIC | Age: 79
End: 2024-07-31
Payer: MEDICARE

## 2024-07-31 DIAGNOSIS — Z95.0 PACEMAKER: Primary | ICD-10-CM

## 2024-07-31 NOTE — PROGRESS NOTES
[facility] Medtronic Dual Pacemaker   Patient of Alejandra    Battery 13.5 years    Presenting rhythm AS     A Impedance 418  RV Impedance 513    P wave sensing 1.5  R wave sensing 14.6    A Threshold 0.625 @ 0.4  A Amplitude 1.5 @ 0.4  RV Thresholds 0.625 @ 0.4  RV Amplitude 2 @ 0.4      A Paced 2.5%  V Paced 27.3%    Programmed Mode AAI <=> DDD        Afib [place] <0.1%    Episodes  AF

## 2024-09-12 ENCOUNTER — OFFICE VISIT (OUTPATIENT)
Dept: UROLOGY | Age: 79
End: 2024-09-12
Payer: MEDICARE

## 2024-09-12 VITALS — BODY MASS INDEX: 27.86 KG/M2 | WEIGHT: 199 LBS | RESPIRATION RATE: 18 BRPM | HEIGHT: 71 IN

## 2024-09-12 DIAGNOSIS — R30.0 DYSURIA: Primary | ICD-10-CM

## 2024-09-12 DIAGNOSIS — R97.20 ELEVATED PSA: ICD-10-CM

## 2024-09-12 DIAGNOSIS — R31.29 MICROSCOPIC HEMATURIA: ICD-10-CM

## 2024-09-12 LAB
BACTERIA: ABNORMAL
BILIRUB UR QL STRIP: NEGATIVE
BILIRUBIN, URINE: NEGATIVE
BLOOD URINE, POC: ABNORMAL
CASTS #/AREA URNS LPF: ABNORMAL /LPF
CASTS #/AREA URNS LPF: ABNORMAL /LPF
CHARACTER UR: ABNORMAL
CHARACTER, URINE: ABNORMAL
CHARCOAL URNS QL MICRO: ABNORMAL
COLOR UR: YELLOW
COLOR, UA: YELLOW
CRYSTALS URNS QL MICRO: ABNORMAL
EPITHELIAL CELLS, UA: ABNORMAL /HPF
GLUCOSE UR QL STRIP.AUTO: NEGATIVE MG/DL
GLUCOSE URINE: NEGATIVE MG/DL
HGB UR QL STRIP.AUTO: ABNORMAL
KETONES UR QL STRIP.AUTO: NEGATIVE
KETONES, URINE: NEGATIVE
LEUKOCYTE CLUMPS, URINE: ABNORMAL
LEUKOCYTE ESTERASE UR QL STRIP.AUTO: ABNORMAL
NITRITE UR QL STRIP.AUTO: NEGATIVE
NITRITE, URINE: NEGATIVE
PH UR STRIP.AUTO: 6.5 [PH] (ref 5–9)
PH, URINE: 6.5 (ref 5–9)
POST VOID RESIDUAL (PVR): 62 ML
PROT UR STRIP.AUTO-MCNC: 30 MG/DL
PROTEIN, URINE: ABNORMAL MG/DL
RBC #/AREA URNS HPF: ABNORMAL /HPF
RENAL EPI CELLS #/AREA URNS HPF: ABNORMAL /[HPF]
SPECIFIC GRAVITY UA: 1.01 (ref 1–1.03)
SPECIFIC GRAVITY UA: 1.01 (ref 1–1.03)
UROBILINOGEN, URINE: 0.2 EU/DL (ref 0–1)
UROBILINOGEN, URINE: 0.2 EU/DL (ref 0–1)
WBC #/AREA URNS HPF: > 200 /HPF
YEAST LIKE FUNGI URNS QL MICRO: ABNORMAL

## 2024-09-12 PROCEDURE — 1123F ACP DISCUSS/DSCN MKR DOCD: CPT | Performed by: NURSE PRACTITIONER

## 2024-09-12 PROCEDURE — 81003 URINALYSIS AUTO W/O SCOPE: CPT | Performed by: NURSE PRACTITIONER

## 2024-09-12 PROCEDURE — 99204 OFFICE O/P NEW MOD 45 MIN: CPT | Performed by: NURSE PRACTITIONER

## 2024-09-12 PROCEDURE — 51798 US URINE CAPACITY MEASURE: CPT | Performed by: NURSE PRACTITIONER

## 2024-09-12 RX ORDER — TAMSULOSIN HYDROCHLORIDE 0.4 MG/1
0.4 CAPSULE ORAL DAILY
Qty: 90 CAPSULE | Refills: 3 | Status: SHIPPED | OUTPATIENT
Start: 2024-09-12

## 2024-09-12 ASSESSMENT — ENCOUNTER SYMPTOMS
ABDOMINAL PAIN: 0
NAUSEA: 0
VOMITING: 0
BACK PAIN: 0

## 2024-09-13 LAB
BACTERIA UR CULT: ABNORMAL
ORGANISM: ABNORMAL
PSA, ULTRASENSITIVE: 11 NG/ML

## 2024-09-19 ENCOUNTER — TELEPHONE (OUTPATIENT)
Dept: UROLOGY | Age: 79
End: 2024-09-19

## 2024-09-19 RX ORDER — CIPROFLOXACIN 500 MG/1
500 TABLET, FILM COATED ORAL 2 TIMES DAILY
Qty: 20 TABLET | Refills: 0 | Status: SHIPPED | OUTPATIENT
Start: 2024-09-19 | End: 2024-09-29

## 2024-09-27 ENCOUNTER — HOSPITAL ENCOUNTER (EMERGENCY)
Age: 79
Discharge: HOME | End: 2024-09-27
Payer: MEDICARE

## 2024-09-27 VITALS
BODY MASS INDEX: 27.91 KG/M2 | DIASTOLIC BLOOD PRESSURE: 97 MMHG | SYSTOLIC BLOOD PRESSURE: 184 MMHG | HEART RATE: 57 BPM | WEIGHT: 200 LBS | OXYGEN SATURATION: 97 % | RESPIRATION RATE: 16 BRPM | TEMPERATURE: 98.4 F

## 2024-09-27 DIAGNOSIS — R04.0 EPISTAXIS: Primary | ICD-10-CM

## 2024-09-27 PROCEDURE — 99282 EMERGENCY DEPT VISIT SF MDM: CPT

## 2024-09-27 PROCEDURE — 30901 CONTROL OF NOSEBLEED: CPT

## 2024-09-27 RX ORDER — TRANEXAMIC ACID 100 MG/ML
1000 INJECTION, SOLUTION INTRAVENOUS ONCE
Status: DISCONTINUED | OUTPATIENT
Start: 2024-09-27 | End: 2024-09-27 | Stop reason: HOSPADM

## 2024-09-27 RX ORDER — OXYMETAZOLINE HYDROCHLORIDE 0.05 G/100ML
2 SPRAY NASAL ONCE
Status: DISCONTINUED | OUTPATIENT
Start: 2024-09-27 | End: 2024-09-27 | Stop reason: HOSPADM

## 2024-09-27 NOTE — ED PROVIDER NOTES
that his father is . He indicated that both of his sisters are alive.   family history includes Cancer in his mother; Heart Disease in his father.    SOCIAL HISTORY     reports that he has been smoking cigarettes. He has a 37.5 pack-year smoking history. He has been exposed to tobacco smoke. He quit smokeless tobacco use about 10 years ago. He reports current alcohol use. He reports that he does not use drugs.    PHYSICAL EXAM    INITIAL VITALS:  weight is 90.7 kg (200 lb). His oral temperature is 98.4 °F (36.9 °C). His blood pressure is 184/97 (abnormal) and his pulse is 57. His respiration is 16 and oxygen saturation is 97%.    Physical Exam  Vitals and nursing note reviewed.   Constitutional:       Appearance: Normal appearance.   HENT:      Head: Normocephalic and atraumatic.      Right Ear: Tympanic membrane normal.      Left Ear: Tympanic membrane normal.      Nose: No nasal deformity or nasal tenderness.      Right Nostril: Epistaxis present. No septal hematoma or occlusion.      Left Nostril: No epistaxis or septal hematoma.      Mouth/Throat:      Mouth: Mucous membranes are moist.   Cardiovascular:      Rate and Rhythm: Normal rate and regular rhythm.   Pulmonary:      Effort: Pulmonary effort is normal.      Breath sounds: Normal breath sounds.   Musculoskeletal:      Cervical back: Normal range of motion and neck supple.   Neurological:      Mental Status: He is alert.   Psychiatric:         Mood and Affect: Mood normal.         Behavior: Behavior normal.         Thought Content: Thought content normal.         Judgment: Judgment normal.           MEDICAL DECISION MAKING:  Tad Maria is a 79 y.o. male who presents to the emergency dept  with no bleed.  Patient is on blood thinners.  He has had nosebleeds in the past it has been over 2 years he believes that he has had 1 he is need to be treated in the hospital anywhere.  He states he woke up with it this morning it seems to be his right naris.   He has not tried any medication for it but he did put a nasal clamp on it for about an hour when he took it off he was still bleeding.  Patient denies any headache fever or chills.  He has no other symptoms contributory.  No nasal trauma or recent upper respiratory illness.  Patient's heart and lung exam are normal.  HEENT is unremarkable other than the epistaxis in the right naris.  Patient has some bleeding in the right naris.  Currently had a clamp on when he took the clamp off he did have some active bleeding.  Reintroduced the clamp and gather materials to use medication for control.  When I saw the patient again he did still have some bleeding in his right nose but it was oozing.  Attempted to use Afrin which did not seem to control bleeding and then put 1 mL of TXA.  Clamp was replaced for reevaluation 30 minutes.  When I reevaluated the patient he still had some bleeding that started approximately 10 minutes later.  At that time using shared decision making we agreed to place a nasal packing.  I did place a anterior packing and inflated the balloon without discomfort or complication.  There was no residual bleeding around the area.  I do not see any blood going down the back of his throat so I do not suspect posterior bleed at this time.  I was able to visualize some areas in his anterior nasal passages that seem to be using.  There was 2 that I can visualize myself.  Patient has seen ENT before for this very problem.  He was agreeable to follow-up with him again.  He was encouraged to call today or Monday morning to make an appointment for follow-up.  He was encouraged to return here with any worsening or concerning symptoms.  Patient was agreeable to this plan of care will follow-up as discussed.       Functioning independently as a Mid-Level Practitioner, I have fully participated in the care of this patient and I have reviewed and agree with all pertinent clinical information including history, physical

## 2024-09-27 NOTE — ED TRIAGE NOTES
Pt to ED with a nose bleed since about 0400. States that he gets nose bleeds a lot. Pt has a clamp on his nose. This RN removed it and the nose started to immediately bleed.

## 2024-10-28 ENCOUNTER — NURSE ONLY (OUTPATIENT)
Dept: CARDIOLOGY CLINIC | Age: 79
End: 2024-10-28

## 2024-10-28 ENCOUNTER — TELEPHONE (OUTPATIENT)
Dept: CARDIOLOGY CLINIC | Age: 79
End: 2024-10-28

## 2024-10-28 DIAGNOSIS — Z95.0 PACEMAKER: Primary | ICD-10-CM

## 2024-10-28 NOTE — TELEPHONE ENCOUNTER
FYI    PT IN FOR A [employer] DUAL PACER CHECK TODAY    NOTED 3 MINUTES OF AFIB WITH RVR ON 5/20/24 AND NONE SINCE  AFIB B[place] <0.1%    BABY ASA ONLY

## 2024-11-06 NOTE — TELEPHONE ENCOUNTER
RACHEL   - referral to pelvic floor therapy  - of no improvements with PT can consider urethral sling, however, would want to have results of UDS before considering surgical intervention   appt r/s in [facility] clinic

## 2025-01-27 PROCEDURE — 93296 REM INTERROG EVL PM/IDS: CPT | Performed by: INTERNAL MEDICINE

## 2025-01-27 PROCEDURE — 93294 REM INTERROG EVL PM/LDLS PM: CPT | Performed by: INTERNAL MEDICINE

## 2025-03-18 ENCOUNTER — TELEPHONE (OUTPATIENT)
Dept: CARDIOLOGY CLINIC | Age: 80
End: 2025-03-18

## 2025-03-18 NOTE — TELEPHONE ENCOUNTER
Call to house, spoke with daughter, pt is    Ok to dispose of [employer] monitor   Chart marked as

## (undated) DEVICE — DRAPE KIT RAMAPR RADIATION SHIELD

## (undated) DEVICE — ADAPTER CATH Y MOD GATEWY +

## (undated) DEVICE — GUIDEWIRE VASC L150CM DIA0.035IN FLX END L7CM J 3MM PTFE

## (undated) DEVICE — CATHETER GUID 5FR L100CM DIA0.058IN NYL SHFT EBU3.75 W/O

## (undated) DEVICE — PINNACLE INTRODUCER SHEATH: Brand: PINNACLE

## (undated) DEVICE — CATHETER ANGIO 5FR L100CM GRY S STL NYL JL3.5 3 SEG BRAID L

## (undated) DEVICE — RUNTHROUGH NS EXTRA FLOPPY PTCA GUIDEWIRE: Brand: RUNTHROUGH

## (undated) DEVICE — CATHETER ANGIO JL4 0.045 INX5 FRX100 CM THRULUMEN EXPO

## (undated) DEVICE — KIT MED IMAG CNTRST AGNT W/ IOPAMIDOL REUSE

## (undated) DEVICE — CATH BLLN ANGIO 2.50X12MM NC EUPHORIA RX

## (undated) DEVICE — KIT MFLD ISOLATN NACL CNTRST PRT TBNG SPIK W/ PRSS TRNSDUC

## (undated) DEVICE — DEVICE INFL 20ML 30ATM POLYCARB BRL ABS PLUNG DGT DISPLAY

## (undated) DEVICE — ANGIO-SEAL VIP VASCULAR CLOSURE DEVICE: Brand: ANGIO-SEAL

## (undated) DEVICE — CATHETER ANGIO INFIN 038IN AMPLATZ 534545T

## (undated) DEVICE — CATHETER ANGIO 5FR L100CM GRY S STL NYL JR4 3 SEG BRAID L

## (undated) DEVICE — KIT MICROINTRODUCER 4FR ECHOGENIC NDL L7CM 21GA STIFF COAX

## (undated) DEVICE — KIT ANGIO W/ AT P65 PREM HND CTRL FOR CNTRST DEL ANGIOTOUCH

## (undated) DEVICE — GUIDE CATHETER: Brand: RUNWAY™